# Patient Record
Sex: FEMALE | Race: WHITE | NOT HISPANIC OR LATINO | Employment: OTHER | URBAN - METROPOLITAN AREA
[De-identification: names, ages, dates, MRNs, and addresses within clinical notes are randomized per-mention and may not be internally consistent; named-entity substitution may affect disease eponyms.]

---

## 2017-01-02 ENCOUNTER — HOSPITAL ENCOUNTER (EMERGENCY)
Facility: MEDICAL CENTER | Age: 67
End: 2017-01-03
Attending: EMERGENCY MEDICINE
Payer: COMMERCIAL

## 2017-01-02 ENCOUNTER — APPOINTMENT (OUTPATIENT)
Dept: RADIOLOGY | Facility: MEDICAL CENTER | Age: 67
End: 2017-01-02
Attending: EMERGENCY MEDICINE
Payer: COMMERCIAL

## 2017-01-02 DIAGNOSIS — R05.3 PERSISTENT COUGH: ICD-10-CM

## 2017-01-02 DIAGNOSIS — J20.9 BRONCHITIS WITH BRONCHOSPASM: ICD-10-CM

## 2017-01-02 PROCEDURE — 71020 DX-CHEST-2 VIEWS: CPT

## 2017-01-02 PROCEDURE — 700101 HCHG RX REV CODE 250: Performed by: EMERGENCY MEDICINE

## 2017-01-02 PROCEDURE — 99284 EMERGENCY DEPT VISIT MOD MDM: CPT

## 2017-01-02 PROCEDURE — 94760 N-INVAS EAR/PLS OXIMETRY 1: CPT

## 2017-01-02 PROCEDURE — 94640 AIRWAY INHALATION TREATMENT: CPT

## 2017-01-02 RX ADMIN — ALBUTEROL SULFATE 2.5 MG: 2.5 SOLUTION RESPIRATORY (INHALATION) at 22:38

## 2017-01-02 ASSESSMENT — PAIN SCALES - GENERAL: PAINLEVEL_OUTOF10: 0

## 2017-01-02 NOTE — ED AVS SNAPSHOT
[facility] Access Code: Activation code not generated  Current [facility] Status: Active    TIME PLUS Qhart  A secure, online tool to manage your health information     [employer]’s [facility]® is a secure, online tool that connects you to your personalized health information from the privacy of your home -- day or night - making it very easy for you to manage your healthcare. Once the activation process is completed, you can even access your medical information using the [facility] yash, which is available for free in the Apple Yash store or Google Play store.     [facility] provides the following levels of access (as shown below):   My Chart Features   [facility] Primary Care Doctor [facility]  Specialists [facility]  Urgent  Care Non-[facility]  Primary Care  Doctor   Email your healthcare team securely and privately 24/7 X X X X   Manage appointments: schedule your next appointment; view details of past/upcoming appointments X      Request prescription refills. X      View recent personal medical records, including lab and immunizations X X X X   View health record, including health history, allergies, medications X X X X   Read reports about your outpatient visits, procedures, consult and ER notes X X X X   See your discharge summary, which is a recap of your hospital and/or ER visit that includes your diagnosis, lab results, and care plan. X X       How to register for [facility]:  1. Go to  [email or web address].  2. Click on the Sign Up Now box, which takes you to the New Member Sign Up page. You will need to provide the following information:  a. Enter your [facility] Access Code exactly as it appears at the top of this page. (You will not need to use this code after you’ve completed the sign-up process. If you do not sign up before the expiration date, you must request a new code.)   b. Enter your date of birth.   c. Enter your home email address.   d. Click Submit, and follow the next screen’s instructions.  3. Create a [facility] ID. This will  be your [employer] login ID and cannot be changed, so think of one that is secure and easy to remember.  4. Create a [employer] password. You can change your password at any time.  5. Enter your Password Reset Question and Answer. This can be used at a later time if you forget your password.   6. Enter your e-mail address. This allows you to receive e-mail notifications when new information is available in [employer].  7. Click Sign Up. You can now view your health information.    For assistance activating your [employer] account, call [phone]

## 2017-01-02 NOTE — ED AVS SNAPSHOT
1/3/2017          Prince Mazariegos  [address] [place] [address]    Dear Prince:    [facility] wants to ensure your discharge home is safe and you or your loved ones have had all your questions answered regarding your care after you leave the hospital.    You may receive a telephone call within two days of your discharge.  This call is to make certain you understand your discharge instructions as well as ensure we provided you with the best care possible during your stay with us.     The call will only last approximately 3-5 minutes and will be done by a nurse.    Once again, we want to ensure your discharge home is safe and that you have a clear understanding of any next steps in your care.  If you have any questions or concerns, please do not hesitate to contact us, we are here for you.  Thank you for choosing [facility] for your healthcare needs.    Sincerely,    Amol Angelo    [facility]

## 2017-01-02 NOTE — ED AVS SNAPSHOT
After Visit Summary                                                                                                                Prince Mazariegos   MRN: [id number]    Department:  [facility], Emergency Dept   Date of Visit:  1/2/2017            [facility], Emergency Dept    [address]    [place] [address]    Phone:  [phone]      You were seen by     Kim Fontaine D.O.      Your Diagnosis Was     Persistent cough     R05       These are the medications you received during your hospitalization from 01/02/2017 1952 to 01/03/2017 0006     Date/Time Order Dose Route Action    01/02/2017 2238 albuterol (PROVENTIL) 2.5mg/0.5ml nebulizer solution 2.5 mg 2.5 mg Nebulization Given      Follow-up Information     1. Follow up with Joao Ireland M.D.. Schedule an appointment as soon as possible for a visit in 1 week.    Specialty:  Family Medicine    Why:  As needed    Contact information    [address]  [address]  [place] [address] [phone]        Medication Information     Review all of your home medications and newly ordered medications with your primary doctor and/or pharmacist as soon as possible. Follow medication instructions as directed by your doctor and/or pharmacist.     Please keep your complete medication list with you and share with your physician. Update the information when medications are discontinued, doses are changed, or new medications (including over-the-counter products) are added; and carry medication information at all times in the event of emergency situations.               Medication List      START taking these medications        Instructions    albuterol 108 (90 BASE) MCG/ACT Aers inhalation aerosol    Inhale 2 Puffs by mouth every 6 hours as needed for Shortness of Breath.   Dose:  2 Puff       benzonatate 200 MG capsule   Commonly known as:  TESSALON    Take 1 Cap by mouth 3 times a day as needed for Cough for up to  20 doses.   Dose:  200 mg         ASK your doctor about these medications        Instructions    Calcium 500 MG Chew    Take 1,000 mg by mouth every day.   Dose:  1000 mg       calcium acetate 667 MG Caps   Commonly known as:  PHOS-LO    Take 1,334 mg by mouth 3 times a day, with meals.   Dose:  1334 mg       Fiber Powd    Take  by mouth.       hydrochlorothiazide 25 MG Tabs   Commonly known as:  HYDRODIURIL    TAKE 1 TAB BY MOUTH EVERY DAY.       levothyroxine 75 MCG Tabs   Commonly known as:  SYNTHROID    TAKE 1 TAB BY MOUTH EVERY DAY.       lisinopril 40 MG tablet   Commonly known as:  PRINIVIL, ZESTRIL    TAKE 1 TAB BY MOUTH EVERY DAY.       metoprolol SR 50 MG Tb24   Commonly known as:  TOPROL XL    TAKE 1 TABLET BY MOUTH EVERY DAY.       MULTI-VITAMIN GUMMIES PO    Take  by mouth.       multivitamin Tabs    Take 1 Tab by mouth every day.   Dose:  1 Tab       nitroglycerin 0.4 MG Subl   Commonly known as:  NITROSTAT    Place 1 Tab under tongue as needed for Chest Pain.   Dose:  0.4 mg       rosuvastatin 10 MG Tabs   Commonly known as:  CRESTOR    TAKE 1 TAB BY MOUTH EVERY DAY.       TUMS 500 MG Chew   Generic drug:  calcium carbonate    Take 500 mg by mouth every day.   Dose:  500 mg               Procedures and tests performed during your visit     DX-CHEST-2 VIEWS    Pulse Ox        Discharge Instructions       Cool Mist Vaporizers  Vaporizers may help relieve the symptoms of a cough and cold. They add moisture to the air, which helps mucus to become thinner and less sticky. This makes it easier to breathe and cough up secretions. Cool mist vaporizers do not cause serious burns like hot mist vaporizers, which may also be called steamers or humidifiers. Vaporizers have not been proven to help with colds. You should not use a vaporizer if you are allergic to mold.  HOME CARE INSTRUCTIONS  · Follow the package instructions for the vaporizer.  · Do not use anything other than distilled water in the  vaporizer.  · Do not run the vaporizer all of the time. This can cause mold or bacteria to grow in the vaporizer.  · Clean the vaporizer after each time it is used.  · Clean and dry the vaporizer well before storing it.  · Stop using the vaporizer if worsening respiratory symptoms develop.     This information is not intended to replace advice given to you by your health care provider. Make sure you discuss any questions you have with your health care provider.     Document Released: 09/14/2005 Document Revised: 12/23/2014 Document Reviewed: 05/07/2014  [employer] Interactive Patient Education ©2016 Else[employer] Inc.    Cough, Adult   A cough is a reflex. It helps you clear your throat and airways. A cough can help heal your body. A cough can last 2 or 3 weeks (acute) or may last more than 8 weeks (chronic). Some common causes of a cough can include an infection, allergy, or a cold.  HOME CARE  · Only take medicine as told by your doctor.  · If given, take your medicines (antibiotics) as told. Finish them even if you start to feel better.  · Use a cold steam vaporizer or humidifier in your home. This can help loosen thick spit (secretions).  · Sleep so you are almost sitting up (semi-upright). Use pillows to do this. This helps reduce coughing.  · Rest as needed.  · Stop smoking if you smoke.  GET HELP RIGHT AWAY IF:  · You have yellowish-white fluid (pus) in your thick spit.  · Your cough gets worse.  · Your medicine does not reduce coughing, and you are losing sleep.  · You cough up blood.  · You have trouble breathing.  · Your pain gets worse and medicine does not help.  · You have a fever.  MAKE SURE YOU:   · Understand these instructions.  · Will watch your condition.  · Will get help right away if you are not doing well or get worse.     This information is not intended to replace advice given to you by your health care provider. Make sure you discuss any questions you have with your health care provider.      Document Released: 08/30/2012 Document Revised: 01/08/2016 Document Reviewed: 02/24/2016  [employer] Interactive Patient Education ©2016 [employer].    Bronchitis  Bronchitis is the body's way of reacting to injury and/or infection (inflammation) of the bronchi. Bronchi are the air tubes that extend from the windpipe into the lungs. If the inflammation becomes severe, it may cause shortness of breath.  CAUSES   Inflammation may be caused by:  · A virus.  · Germs (bacteria).  · Dust.  · Allergens.  · Pollutants and many other irritants.  The cells lining the bronchial tree are covered with tiny hairs (cilia). These constantly beat upward, away from the lungs, toward the mouth. This keeps the lungs free of pollutants. When these cells become too irritated and are unable to do their job, mucus begins to develop. This causes the characteristic cough of bronchitis. The cough clears the lungs when the cilia are unable to do their job. Without either of these protective mechanisms, the mucus would settle in the lungs. Then you would develop pneumonia.  Smoking is a common cause of bronchitis and can contribute to pneumonia. Stopping this habit is the single most important thing you can do to help yourself.  TREATMENT   · Your caregiver may prescribe an antibiotic if the cough is caused by bacteria. Also, medicines that open up your airways make it easier to breathe. Your caregiver may also recommend or prescribe an expectorant. It will loosen the mucus to be coughed up. Only take over-the-counter or prescription medicines for pain, discomfort, or fever as directed by your caregiver.  · Removing whatever causes the problem (smoking, for example) is critical to preventing the problem from getting worse.  · Cough suppressants may be prescribed for relief of cough symptoms.  · Inhaled medicines may be prescribed to help with symptoms now and to help prevent problems from returning.  · For those with recurrent (chronic)  bronchitis, there may be a need for steroid medicines.  SEEK IMMEDIATE MEDICAL CARE IF:   · During treatment, you develop more pus-like mucus (purulent sputum).  · You have a fever.  · Your baby is older than 3 months with a rectal temperature of 102° F (38.9° C) or higher.  · Your baby is 3 months old or younger with a rectal temperature of 100.4° F (38° C) or higher.  · You become progressively more ill.  · You have increased difficulty breathing, wheezing, or shortness of breath.  It is necessary to seek immediate medical care if you are elderly or sick from any other disease.  MAKE SURE YOU:   · Understand these instructions.  · Will watch your condition.  · Will get help right away if you are not doing well or get worse.  Document Released: 12/18/2006 Document Revised: 03/11/2013 Document Reviewed: 10/27/2009  [employer]Care® Patient Information ©2014 [employer].            Patient Information     Patient Information    Following emergency treatment: all patient requiring follow-up care must return either to a private physician or a clinic if your condition worsens before you are able to obtain further medical attention, please return to the emergency room.     Billing Information    At [facility], we work to make the billing process streamlined for our patients.  Our Representatives are here to answer any questions you may have regarding your hospital bill.  If you have insurance coverage and have supplied your insurance information to us, we will submit a claim to your insurer on your behalf.  Should you have any questions regarding your bill, we can be reached online or by phone as follows:  Online: You are able pay your bills online or live chat with our representatives about any billing questions you may have. We are here to help Monday - Friday from 8:00am to 7:30pm and 9:00am - 12:00pm on Saturdays.  Please visit [email or web address]  for more information.   Phone:   [phone] or [phone]    Please note that your emergency physician, surgeon, pathologist, radiologist, anesthesiologist, and other specialists are not employed by [facility] and will therefore bill separately for their services.  Please contact them directly for any questions concerning their bills at the numbers below:     [facility] Physician Services:  [phone]  [facility]:  [phone]  [facility]:  [phone]  [facility]:  [phone]    1. Your final bill may vary from the amount quoted upon discharge if all procedures are not complete at that time, or if your doctor has additional procedures of which we are not aware. You will receive an additional bill if you return to the Emergency Department at [facility] for suture removal regardless of the facility of which the sutures were placed.     2. Please arrange for settlement of this account at the emergency registration.    3. All self-pay accounts are due in full at the time of treatment.  If you are unable to meet this obligation then payment is expected within 4-5 days.     4. If you have had radiology studies (CT, X-ray, Ultrasound, MRI), you have received a preliminary result during your emergency department visit. Please contact the radiology department [phone] to receive a copy of your final result. Please discuss the Final result with your primary physician or with the follow up physician provided.     Crisis Hotline:  [place] Crisis Hotline:  5-007-MAFDABJ or 1-393.849.7277  [place] Crisis Hotline:    1-659.222.9176 or 349-147-8522         ED Discharge Follow Up Questions    1. In order to provide you with very good care, we would like to follow up with a phone call in the next few days.  May we have your permission to contact you?     YES /  NO    2. What is the best phone number to call you? (       )_____-__________    3. What is the best time to call you?      Morning  /   Afternoon  /  Evening                   Patient Signature:  ____________________________________________________________    Date:  ____________________________________________________________      Your appointments     Mar 14, 2017  8:00 AM   Established Patient with Joao Ireland M.D.   [facility]    [address]  [facility] [address]   [phone]           You will be receiving a confirmation call a few days before your appointment from our automated call confirmation system.

## 2017-01-03 ENCOUNTER — PATIENT OUTREACH (OUTPATIENT)
Dept: HEALTH INFORMATION MANAGEMENT | Facility: OTHER | Age: 67
End: 2017-01-03

## 2017-01-03 VITALS
SYSTOLIC BLOOD PRESSURE: 117 MMHG | HEART RATE: 79 BPM | HEIGHT: 62 IN | RESPIRATION RATE: 18 BRPM | BODY MASS INDEX: 40.69 KG/M2 | WEIGHT: 221.12 LBS | OXYGEN SATURATION: 97 % | DIASTOLIC BLOOD PRESSURE: 57 MMHG | TEMPERATURE: 97.2 F

## 2017-01-03 RX ORDER — ALBUTEROL SULFATE 90 UG/1
2 AEROSOL, METERED RESPIRATORY (INHALATION) EVERY 6 HOURS PRN
Qty: 8.5 G | Refills: 0 | Status: SHIPPED | OUTPATIENT
Start: 2017-01-03 | End: 2017-07-19 | Stop reason: SDUPTHER

## 2017-01-03 RX ORDER — BENZONATATE 200 MG/1
200 CAPSULE ORAL 3 TIMES DAILY PRN
Qty: 20 CAP | Refills: 0 | Status: SHIPPED | OUTPATIENT
Start: 2017-01-03 | End: 2017-04-10

## 2017-01-03 NOTE — ED NOTES
DC instructions, f/u et RX x2 provided et discussed, understanding verbalized.  Asks for an address to a 24 hour pharmacy, directions et address given.  Denies further needs, ambulates out of ER without difficulty, NAD noted.

## 2017-01-03 NOTE — ED PROVIDER NOTES
ED Provider Note    CHIEF COMPLAINT  Chief Complaint   Patient presents with   • Cough       HPI  Prince Mazariegos is a 66 y.o. female who presents tonight with a chief complaint of persistent cough since 18 December. She was seen at the urgent care and placed on doxycycline and Tessalon and states that she took them for a full 10 days but still had 10 pills left. She states she still has a persistent cough which is clear in nature. Despite taking the medication she states she is not getting better. She denies any fever, chills, hemoptysis or difficulty breathing. She states she is leaving to fly to [place] in the morning and wanted to get this taken care of.    REVIEW OF SYSTEMS  See HPI for further details. All other system reviews are negative.    PAST MEDICAL HISTORY  Past Medical History   Diagnosis Date   • Enlarged tonsils      Removed with adenoids   • Hypertension      Medicated   • Psychiatric disorder      Anxiety - medicated   • High cholesterol    • Arrhythmia    • GERD (gastroesophageal reflux disease)    • Arthritis    • Anesthesia      PONV   • Skin cancer      skin   • Chest pain May 2016     PET scan with no infarct or ischemia, normal LVEF.   • DDD (degenerative disc disease), cervical    • Hemorrhoids      bleeding   • Back pain    • Obesity    • Chickenpox    • [place] measles        FAMILY HISTORY  Family History   Problem Relation Age of Onset   • Cancer Mother    • Cancer Sister    • Cancer Maternal Aunt    • Cancer Other    • Cancer Paternal Grandfather      liver   • Heart Disease Neg Hx        SOCIAL HISTORY  Social History     Social History   • Marital Status: Single     Spouse Name: N/A   • Number of Children: N/A   • Years of Education: N/A     Social History Main Topics   • Smoking status: Never Smoker    • Smokeless tobacco: Never Used   • Alcohol Use: No   • Drug Use: No   • Sexual Activity: Not Asked     Other Topics Concern   • None     Social History Narrative       SURGICAL  "HISTORY  Past Surgical History   Procedure Laterality Date   • Other cardiac surgery       Cath - 4-5 years ago with ba   • Lumpectomy Right 2007     Breast lump removed   • Hysteroscopy with video diagnostic  10/7/08     Performed by MICHELINE WEBER at SURGERY SAME DAY [facility] ORS   • Dilation and curettage  10/7/08     Performed by MICHELINE WEBER at SURGERY [facility] DAY [facility]   • Carpal tunnel endoscopic  10/17/2011     Performed by EDGAR GARCIA at SURGERY SAME DAY [facility] ORS   • Carpal tunnel endoscopic  10/28/2011     Performed by EDGAR GARCIA at SURGERY SAME DAY [facility]   • Other       hemorrhoid banding   • Tonsillectomy     • Carpal tunnel release         CURRENT MEDICATIONS  Patient just finished doxycycline and Tessalon for 10 days    ALLERGIES  Allergies   Allergen Reactions   • Aleve [Gnp Naproxen Sodium]    • Latex Rash   • Naproxen Sodium Hives   • Other Misc Swelling     Some soap & toothpaste   • Skelaxin [Metaxalone] Hives   • Sunscreens        PHYSICAL EXAM  VITAL SIGNS: /57 mmHg  Pulse 66  Temp(Src) 36.2 °C (97.2 °F)  Resp 18  Ht 1.575 m (5' 2\")  Wt 100.3 kg (221 lb 1.9 oz)  BMI 40.43 kg/m2  SpO2 98%    Constitutional: Patient is well developed, well nourished in no acute distress.   HENT: Normocephalic, Tm's visualized without erythema. Oropharynx moist without erythema , nose normal with no drainage.   Eyes: PERRL, EOMI, Conjunctiva without erythema.   Neck: Supple with no anterior cervical adenopathy. Normal range of motion in flexion, extension and lateral rotation.   Lymphatic: No lymphadenopathy noted.   Cardiovascular: Normal heart rate and rhythm. No murmur.  Thorax & Lungs: Clear and equal breath sounds with diminished air exchange, no rhonchi, wheezing or rales.  Abdomen: Bowel sounds normal in all four quadrants. Soft,nontender.  Skin: Warm, Dry, No rashes.   Back: No cervical, thoracic, or lumbosacral tenderness.   Extremities: " Peripheral pulses 4/4 No edema, No tenderness   Musculoskeletal: Normal range of motion in all major joints. No tenderness to palpation.  Neurologic: Alert & oriented x 3, Normal motor function, Normal sensory function  Psychiatric: Affect normal, Judgment normal, Mood normal.     RADIOLOGY/PROCEDURES  DX-CHEST-2 VIEWS   Final Result      No acute findings.            COURSE & MEDICAL DECISION MAKING  Pertinent Labs & Imaging studies reviewed. (See chart for details)  Patient received an albuterol breathing treatment and chest x-ray was performed. Chest x-ray was negative for any infiltrates. The patient is feeling better after the albuterol treatment. My plan will be to give her spacer instructions with an albuterol inhaler as well as a refill of her Tessalon Perles. She is to increase fluids, avoid dairy products, warm saltwater gargles, cool mist humidifier, follow up with her primary care doctor when she returns back from [place]. She is discharged in stable and improved condition.    FINAL IMPRESSION  1. Persistent cough  2. Recent bronchitis  3. Bronchospasm         Electronically signed by: Kim Fontaine, 1/2/2017 10:53 PM

## 2017-01-03 NOTE — ED NOTES
Complains of unresolved cough since the 18 of December.  Pt has been seen at a urgent care at the time and treated with antibiotics.

## 2017-01-03 NOTE — DISCHARGE INSTRUCTIONS
Cool Mist Vaporizers  Vaporizers may help relieve the symptoms of a cough and cold. They add moisture to the air, which helps mucus to become thinner and less sticky. This makes it easier to breathe and cough up secretions. Cool mist vaporizers do not cause serious burns like hot mist vaporizers, which may also be called steamers or humidifiers. Vaporizers have not been proven to help with colds. You should not use a vaporizer if you are allergic to mold.  HOME CARE INSTRUCTIONS  · Follow the package instructions for the vaporizer.  · Do not use anything other than distilled water in the vaporizer.  · Do not run the vaporizer all of the time. This can cause mold or bacteria to grow in the vaporizer.  · Clean the vaporizer after each time it is used.  · Clean and dry the vaporizer well before storing it.  · Stop using the vaporizer if worsening respiratory symptoms develop.     This information is not intended to replace advice given to you by your health care provider. Make sure you discuss any questions you have with your health care provider.     Document Released: 09/14/2005 Document Revised: 12/23/2014 Document Reviewed: 05/07/2014  [employer] Interactive Patient Education ©2016 [employer].    Cough, Adult   A cough is a reflex. It helps you clear your throat and airways. A cough can help heal your body. A cough can last 2 or 3 weeks (acute) or may last more than 8 weeks (chronic). Some common causes of a cough can include an infection, allergy, or a cold.  HOME CARE  · Only take medicine as told by your doctor.  · If given, take your medicines (antibiotics) as told. Finish them even if you start to feel better.  · Use a cold steam vaporizer or humidifier in your home. This can help loosen thick spit (secretions).  · Sleep so you are almost sitting up (semi-upright). Use pillows to do this. This helps reduce coughing.  · Rest as needed.  · Stop smoking if you smoke.  GET HELP RIGHT AWAY IF:  · You have  yellowish-white fluid (pus) in your thick spit.  · Your cough gets worse.  · Your medicine does not reduce coughing, and you are losing sleep.  · You cough up blood.  · You have trouble breathing.  · Your pain gets worse and medicine does not help.  · You have a fever.  MAKE SURE YOU:   · Understand these instructions.  · Will watch your condition.  · Will get help right away if you are not doing well or get worse.     This information is not intended to replace advice given to you by your health care provider. Make sure you discuss any questions you have with your health care provider.     Document Released: 08/30/2012 Document Revised: 01/08/2016 Document Reviewed: 02/24/2016  [employer] Interactive Patient Education ©2016 Elsevier Inc.    Bronchitis  Bronchitis is the body's way of reacting to injury and/or infection (inflammation) of the bronchi. Bronchi are the air tubes that extend from the windpipe into the lungs. If the inflammation becomes severe, it may cause shortness of breath.  CAUSES   Inflammation may be caused by:  · A virus.  · Germs (bacteria).  · Dust.  · Allergens.  · Pollutants and many other irritants.  The cells lining the bronchial tree are covered with tiny hairs (cilia). These constantly beat upward, away from the lungs, toward the mouth. This keeps the lungs free of pollutants. When these cells become too irritated and are unable to do their job, mucus begins to develop. This causes the characteristic cough of bronchitis. The cough clears the lungs when the cilia are unable to do their job. Without either of these protective mechanisms, the mucus would settle in the lungs. Then you would develop pneumonia.  Smoking is a common cause of bronchitis and can contribute to pneumonia. Stopping this habit is the single most important thing you can do to help yourself.  TREATMENT   · Your caregiver may prescribe an antibiotic if the cough is caused by bacteria. Also, medicines that open up your  airways make it easier to breathe. Your caregiver may also recommend or prescribe an expectorant. It will loosen the mucus to be coughed up. Only take over-the-counter or prescription medicines for pain, discomfort, or fever as directed by your caregiver.  · Removing whatever causes the problem (smoking, for example) is critical to preventing the problem from getting worse.  · Cough suppressants may be prescribed for relief of cough symptoms.  · Inhaled medicines may be prescribed to help with symptoms now and to help prevent problems from returning.  · For those with recurrent (chronic) bronchitis, there may be a need for steroid medicines.  SEEK IMMEDIATE MEDICAL CARE IF:   · During treatment, you develop more pus-like mucus (purulent sputum).  · You have a fever.  · Your baby is older than 3 months with a rectal temperature of 102° F (38.9° C) or higher.  · Your baby is 3 months old or younger with a rectal temperature of 100.4° F (38° C) or higher.  · You become progressively more ill.  · You have increased difficulty breathing, wheezing, or shortness of breath.  It is necessary to seek immediate medical care if you are elderly or sick from any other disease.  MAKE SURE YOU:   · Understand these instructions.  · Will watch your condition.  · Will get help right away if you are not doing well or get worse.  Document Released: 12/18/2006 Document Revised: 03/11/2013 Document Reviewed: 10/27/2009  [employer]® Patient Information ©2014 [employer].

## 2017-01-03 NOTE — PROGRESS NOTES
1-3-17 attempted to contact patient regarding post discharge outreach after her recent visit to [facility] [place].  Left message on voice mail system.  Provided discharge outreach number [phone].  Will attempt to contact again tomorrow.    1-4-17 second attempt to contact patient.  Left message on voice mail system.  Will attempt contact again tomorrow.    1-5-17 third attempt to contact patient.  Again left message on voice mail system.  Patient not contacted.  Will close this encounter.

## 2017-01-03 NOTE — ED NOTES
The patient feels like she has something she needs to cough up but it won't come out.  Patient is scheduled to fly to [place] tomorrow am.

## 2017-03-14 ENCOUNTER — OFFICE VISIT (OUTPATIENT)
Dept: MEDICAL GROUP | Facility: MEDICAL CENTER | Age: 67
End: 2017-03-14
Payer: COMMERCIAL

## 2017-03-14 VITALS
DIASTOLIC BLOOD PRESSURE: 74 MMHG | HEIGHT: 62 IN | WEIGHT: 228 LBS | OXYGEN SATURATION: 98 % | RESPIRATION RATE: 16 BRPM | TEMPERATURE: 97.9 F | SYSTOLIC BLOOD PRESSURE: 128 MMHG | BODY MASS INDEX: 41.96 KG/M2 | HEART RATE: 69 BPM

## 2017-03-14 DIAGNOSIS — E03.9 HYPOTHYROIDISM, UNSPECIFIED TYPE: ICD-10-CM

## 2017-03-14 DIAGNOSIS — M54.6 THORACIC SPINE PAIN: ICD-10-CM

## 2017-03-14 DIAGNOSIS — G47.30 SLEEP APNEA IN ADULT: ICD-10-CM

## 2017-03-14 DIAGNOSIS — E66.01 MORBID OBESITY WITH BMI OF 40.0-44.9, ADULT (HCC): ICD-10-CM

## 2017-03-14 DIAGNOSIS — I10 ESSENTIAL HYPERTENSION: ICD-10-CM

## 2017-03-14 DIAGNOSIS — E78.5 DYSLIPIDEMIA: ICD-10-CM

## 2017-03-14 DIAGNOSIS — R21 RASH OF FACE: ICD-10-CM

## 2017-03-14 DIAGNOSIS — M54.2 CERVICAL SPINE PAIN: ICD-10-CM

## 2017-03-14 DIAGNOSIS — Z23 NEED FOR TDAP VACCINATION: ICD-10-CM

## 2017-03-14 DIAGNOSIS — N18.30 CKD (CHRONIC KIDNEY DISEASE) STAGE 3, GFR 30-59 ML/MIN (HCC): ICD-10-CM

## 2017-03-14 PROCEDURE — 90715 TDAP VACCINE 7 YRS/> IM: CPT | Performed by: FAMILY MEDICINE

## 2017-03-14 PROCEDURE — 90471 IMMUNIZATION ADMIN: CPT | Performed by: FAMILY MEDICINE

## 2017-03-14 PROCEDURE — 99214 OFFICE O/P EST MOD 30 MIN: CPT | Mod: 25 | Performed by: FAMILY MEDICINE

## 2017-03-14 RX ORDER — TRIAMCINOLONE ACETONIDE 0.25 MG/G
1 CREAM TOPICAL 2 TIMES DAILY
Qty: 1 TUBE | Refills: 0 | Status: SHIPPED | OUTPATIENT
Start: 2017-03-14 | End: 2018-05-18

## 2017-03-14 ASSESSMENT — PATIENT HEALTH QUESTIONNAIRE - PHQ9: CLINICAL INTERPRETATION OF PHQ2 SCORE: 0

## 2017-03-14 ASSESSMENT — PAIN SCALES - GENERAL: PAINLEVEL: 5=MODERATE PAIN

## 2017-03-14 NOTE — MR AVS SNAPSHOT
"        Prince Garciar   3/14/2017 8:00 AM   Office Visit   MRN: [id number]    Department:  [facility]   Dept Phone:  [phone]    Description:  Female : 1950   Provider:  Joao Ireland M.D.           Reason for Visit     Follow-Up           Allergies as of 3/14/2017     Allergen Noted Reactions    Aleve [Gnp Naproxen Sodium] 10/12/2011       Latex 10/12/2011   Rash    Naproxen Sodium 06/15/2008   Hives    Other Misc 10/12/2011   Swelling    Some soap & toothpaste    Skelaxin [Metaxalone] 06/15/2008   Hives    Sunscreens 2015         You were diagnosed with     Hypothyroidism, unspecified type   [6845492]       Dyslipidemia   [704057]       Essential hypertension   [7464816]       CKD (chronic kidney disease) stage 3, GFR 30-59 ml/min   [511632]       Morbid obesity with BMI of 40.0-44.9, adult (CMS-HCC)   [211265]       Cervical spine pain   [319247]       Thoracic spine pain   [023243]       Rash of face   [450700]       Need for Tdap vaccination   [757131]         Vital Signs     Blood Pressure Pulse Temperature Respirations Height Weight    128/74 mmHg 69 36.6 °C (97.9 °F) 16 1.575 m (5' 2.01\") 103.42 kg (228 lb)    Body Mass Index Oxygen Saturation Breastfeeding? Smoking Status          41.69 kg/m2 98% No Never Smoker         Basic Information     Date Of Birth Sex Race Ethnicity Preferred Language    1950 Female White Non- English      Your appointments     Apr 10, 2017  3:40 PM   Follow UP with CHLOE Meredith   [facility] (--)    [address] [address] [phone]              Problem List              ICD-10-CM Priority Class Noted - Resolved    Anxiety F41.9 Low  2013 - Present    GERD (gastroesophageal reflux disease) K21.9 Medium  2013 - Present    Hypothyroid E03.9 Medium  2013 - Present    Dyslipidemia E78.5 High  2013 - Present    HTN (hypertension) I10 High  2013 - " Present    CKD (chronic kidney disease) stage 3, GFR 30-59 ml/min N18.3 Medium  9/16/2015 - Present    Chronic lower back pain M54.5, G89.29 Medium  9/16/2015 - Present    Chronic pain of right knee M25.561, G89.29 Medium  9/16/2015 - Present    Bleeding hemorrhoid K64.9   2/12/2016 - Present    Chest pain R07.9 High  4/21/2016 - Present    Cervical spine pain M54.2   6/2/2016 - Present    Thoracic spine pain M54.6   6/2/2016 - Present    Sleep apnea in adult G47.33   7/14/2016 - Present    Increased BMI R63.8   10/25/2016 - Present    Morbid obesity with BMI of 40.0-44.9, adult (HCC) E66.01, Z68.41   3/14/2017 - Present    Rash of face R21   3/14/2017 - Present      Health Maintenance        Date Due Completion Dates    IMM DTaP/Tdap/Td Vaccine (1 - Tdap) 5/29/1971 ---    PAP SMEAR 10/1/2015 10/1/2012 (N/S)    Override on 10/1/2012: (N/S)    IMM INFLUENZA (1) 9/1/2016 11/17/2014, 11/11/2013    MAMMOGRAM 10/7/2017 10/7/2016, 9/2/2015, 8/25/2014, 8/21/2013, 8/20/2012, 8/17/2011, 8/16/2010, 8/16/2010, 8/13/2009, 8/13/2009, 7/23/2008, 7/23/2008, 7/19/2007, 7/19/2007, 3/6/2007, 8/22/2006, 8/15/2006, 8/12/2005, 8/11/2004    COLONOSCOPY 4/1/2019 4/1/2009 (N/S)    Override on 4/1/2009: (N/S)            Current Immunizations     Influenza TIV (IM) 11/11/2013  3:05 PM    Influenza Vaccine Quad Inj (Preserved) 11/17/2014  3:00 PM    SHINGLES VACCINE 11/11/2013  3:04 PM    TD Vaccine 4/13/2000    Tdap Vaccine  Incomplete      Below and/or attached are the medications your provider expects you to take. Review all of your home medications and newly ordered medications with your provider and/or pharmacist. Follow medication instructions as directed by your provider and/or pharmacist. Please keep your medication list with you and share with your provider. Update the information when medications are discontinued, doses are changed, or new medications (including over-the-counter products) are added; and carry medication information  at all times in the event of emergency situations     Allergies:  ALEVE - (reactions not documented)     LATEX - Rash     NAPROXEN SODIUM - Hives     OTHER MISC - Swelling     SKELAXIN - Hives     SUNSCREENS - (reactions not documented)               Medications  Valid as of: March 14, 2017 -  8:13 AM    Generic Name Brand Name Tablet Size Instructions for use    Acetaminophen-Codeine (Tab) TYLENOL #2 300-15 MG Take 1 Tab by mouth every 6 hours as needed for Severe Pain.        Albuterol Sulfate (Aero Soln) albuterol 108 (90 BASE) MCG/ACT Inhale 2 Puffs by mouth every 6 hours as needed for Shortness of Breath.        Benzonatate (Cap) TESSALON 200 MG Take 1 Cap by mouth 3 times a day as needed for Cough for up to 20 doses.        Calcium Acetate (Phos Binder) (Cap) PHOS- MG Take 1,334 mg by mouth 3 times a day, with meals.        Calcium Carbonate (Chew Tab) Calcium 500 MG Take 1,000 mg by mouth every day.        Calcium Carbonate Antacid (Chew Tab) TUMS 500 MG Take 500 mg by mouth every day.        Fiber (Powder) Fiber  Take  by mouth.        HydroCHLOROthiazide (Tab) HYDRODIURIL 25 MG TAKE 1 TAB BY MOUTH EVERY DAY.        Levothyroxine Sodium (Tab) SYNTHROID 75 MCG TAKE 1 TAB BY MOUTH EVERY DAY.        Lisinopril (Tab) PRINIVIL, ZESTRIL 40 MG TAKE 1 TAB BY MOUTH EVERY DAY.        Metoprolol Succinate (TABLET SR 24 HR) TOPROL XL 50 MG TAKE 1 TABLET BY MOUTH EVERY DAY.        Multiple Vitamin (Tab) THERAGRAN  Take 1 Tab by mouth every day.        Multiple Vitamins-Minerals   Take  by mouth.        Rosuvastatin Calcium (Tab) CRESTOR 10 MG TAKE 1 TAB BY MOUTH EVERY DAY.        Triamcinolone Acetonide (Cream) KENALOG 0.025 % Apply 1 Application to affected area(s) 2 times a day.        .                 Medicines prescribed today were sent to:     [id number] - [place] - [address]    [address] [place] [address]    Phone: [phone] Fax: [phone]    Open 24 Hours?: No      Medication  refill instructions:       If your prescription bottle indicates you have medication refills left, it is not necessary to call your provider’s office. Please contact your pharmacy and they will refill your medication.    If your prescription bottle indicates you do not have any refills left, you may request refills at any time through one of the following ways: The online [employer] system (except Urgent Care), by calling your provider’s office, or by asking your pharmacy to contact your provider’s office with a refill request. Medication refills are processed only during regular business hours and may not be available until the next business day. Your provider may request additional information or to have a follow-up visit with you prior to refilling your medication.   *Please Note: Medication refills are assigned a new Rx number when refilled electronically. Your pharmacy may indicate that no refills were authorized even though a new prescription for the same medication is available at the pharmacy. Please request the medicine by name with the pharmacy before contacting your provider for a refill.        Your To Do List     Future Labs/Procedures Complete By Expires    CBC WITH DIFFERENTIAL  As directed 3/15/2018    COMP METABOLIC PANEL  As directed 3/15/2018    LIPID PROFILE  As directed 3/15/2018    TSH WITH REFLEX TO FT4  As directed 3/14/2018    VITAMIN D,25 HYDROXY  As directed 3/15/2018         [facility] Access Code: Activation code not generated  Current [employer] Status: Active

## 2017-03-14 NOTE — ASSESSMENT & PLAN NOTE
Patient had an x-ray done approximately 6 months ago which showed moderate degeneration of cervical spine. She has been using Tylenol to control her pain for the most part. There are occasions where her pain will not be controlled with Tylenol and she is requesting a refill of Tylenol No. 2, which she last filled 3 years ago.

## 2017-03-14 NOTE — ASSESSMENT & PLAN NOTE
Patient is using a CPAP machine. She states that her sleep has improved since using the CPAP machine. She does find some slight discomfort with sleeping with the machine.

## 2017-03-14 NOTE — ASSESSMENT & PLAN NOTE
In 2013 patient was diagnosed with chronic kidney disease stage III, which has been stable since that time. She contributes the diagnosis to heavy NSAID use, which she is now voiding.

## 2017-03-14 NOTE — ASSESSMENT & PLAN NOTE
Patient has moderate degeneration of thoracic spine. It occasionally limits her ability in functioning. She takes Tylenol for pain control but occasionally, particularly with the weather change, she requires something a little stronger. She was given a prescription for Tylenol No. 2 in 2014, which she is now running out of.

## 2017-03-14 NOTE — PROGRESS NOTES
Subjective:   Prince Mazariegos is a 66 y.o. female here today for pain, rash    Cervical spine pain  Patient had an x-ray done approximately 6 months ago which showed moderate degeneration of cervical spine. She has been using Tylenol to control her pain for the most part. There are occasions where her pain will not be controlled with Tylenol and she is requesting a refill of Tylenol No. 2, which she last filled 3 years ago.    Thoracic spine pain  Patient has moderate degeneration of thoracic spine. It occasionally limits her ability in functioning. She takes Tylenol for pain control but occasionally, particularly with the weather change, she requires something a little stronger. She was given a prescription for Tylenol No. 2 in 2014, which she is now running out of.    Rash of face  Patient used a topical cream by the dermatologist broke out with a rash across her nose and forehead. She has multiple allergies. Patient has stopped using the cream. She has been using topical over-the-counter lotions, which have only been helping slightly.    Morbid obesity with BMI of 40.0-44.9, adult (HCC)  Patient has gained approximately 18 pounds in the last 4 months.    Hypothyroid  Patient is on levothyroxine 75 µg daily. She is due for annual blood work.    HTN (hypertension)  Patient is tolerating current medications without noticeable side effects.    Dyslipidemia  Patient is tolerating current medication without noticeable side effects.    CKD (chronic kidney disease) stage 3, GFR 30-59 ml/min  In 2013 patient was diagnosed with chronic kidney disease stage III, which has been stable since that time. She contributes the diagnosis to heavy NSAID use, which she is now voiding.    Sleep apnea in adult  Patient is using a CPAP machine. She states that her sleep has improved since using the CPAP machine. She does find some slight discomfort with sleeping with the machine.         Current medicines (including changes  today)  Current Outpatient Prescriptions   Medication Sig Dispense Refill   • acetaminophen-codeine #2 (TYLENOL #2) 300-15 MG Tab Take 1 Tab by mouth every 6 hours as needed for Severe Pain. 30 Tab 1   • triamcinolone acetonide (KENALOG) 0.025 % Cream Apply 1 Application to affected area(s) 2 times a day. 1 Tube 0   • albuterol 108 (90 BASE) MCG/ACT Aero Soln inhalation aerosol Inhale 2 Puffs by mouth every 6 hours as needed for Shortness of Breath. 8.5 g 0   • benzonatate (TESSALON) 200 MG capsule Take 1 Cap by mouth 3 times a day as needed for Cough for up to 20 doses. 20 Cap 0   • calcium acetate (PHOS-LO) 667 MG Cap Take 1,334 mg by mouth 3 times a day, with meals.     • multivitamin (THERAGRAN) Tab Take 1 Tab by mouth every day.     • Fiber Powder Take  by mouth.     • calcium carbonate (TUMS) 500 MG Chew Tab Take 500 mg by mouth every day.     • metoprolol SR (TOPROL XL) 50 MG TABLET SR 24 HR TAKE 1 TABLET BY MOUTH EVERY DAY. 90 Tab 3   • rosuvastatin (CRESTOR) 10 MG Tab TAKE 1 TAB BY MOUTH EVERY DAY. 90 Tab 3   • levothyroxine (SYNTHROID) 75 MCG Tab TAKE 1 TAB BY MOUTH EVERY DAY. 90 Tab 3   • lisinopril (PRINIVIL, ZESTRIL) 40 MG tablet TAKE 1 TAB BY MOUTH EVERY DAY. 90 Tab 3   • hydrochlorothiazide (HYDRODIURIL) 25 MG Tab TAKE 1 TAB BY MOUTH EVERY DAY. 90 Tab 3   • Calcium 500 MG Chew Tab Take 1,000 mg by mouth every day.     • Multiple Vitamins-Minerals (MULTI-VITAMIN GUMMIES PO) Take  by mouth.       No current facility-administered medications for this visit.     She  has a past medical history of Enlarged tonsils; Hypertension; Psychiatric disorder; High cholesterol; Arrhythmia; GERD (gastroesophageal reflux disease); Arthritis; Anesthesia; Skin cancer; Chest pain (May 2016); DDD (degenerative disc disease), cervical; Hemorrhoids; Back pain; Obesity; Chickenpox; and Yi measles.    ROS   No chest pain, no shortness of breath, no abdominal pain       Objective:     Blood pressure 128/74, pulse 69,  "temperature 36.6 °C (97.9 °F), resp. rate 16, height 1.575 m (5' 2.01\"), weight 103.42 kg (228 lb), SpO2 98 %, not currently breastfeeding. Body mass index is 41.69 kg/(m^2).   Physical Exam:  Constitutional: Alert, no distress.  Skin: Warm, dry, good turgor, no rashes in visible areas.  Eye: Equal, round and reactive, conjunctiva clear, lids normal.  Respiratory: Unlabored respiratory effort, lungs clear to auscultation, no wheezes, no ronchi.  Cardiovascular: Normal S1, S2, no murmur, no edema.  Psych: Alert and oriented x3, normal affect and mood.        Assessment and Plan:   The following treatment plan was discussed    1. Hypothyroidism, unspecified type  Continue current medication. Check labs and call with results. Follow-up in 6 months.  - TSH WITH REFLEX TO FT4; Future    2. Dyslipidemia  Continue current medication. Check labs and call with results. Follow-up in 6 months.  - COMP METABOLIC PANEL; Future  - LIPID PROFILE; Future    3. Essential hypertension  Continue current medications. Check labs and call with results. Follow-up in 6 months.  - CBC WITH DIFFERENTIAL; Future    4. CKD (chronic kidney disease) stage 3, GFR 30-59 ml/min  Advised patient to avoid NSAIDs. Check labs and call with results. Follow-up in 6 months with repeat labs.  - VITAMIN D,25 HYDROXY; Future    5. Morbid obesity with BMI of 40.0-44.9, adult (CMS-[facility])  - Patient identified as having weight management issue.  Appropriate orders and counseling given.    6. Cervical spine pain  Tylenol No. 2 prescription given for very infrequent use. Encourage patient to use Tylenol as a first line pain medication.  - acetaminophen-codeine #2 (TYLENOL #2) 300-15 MG Tab; Take 1 Tab by mouth every 6 hours as needed for Severe Pain.  Dispense: 30 Tab; Refill: 1    7. Thoracic spine pain  Tylenol No. 2 prescription given for very infrequent use. Encourage patient to use Tylenol as a first line pain medication.  - acetaminophen-codeine #2 (TYLENOL " #2) 300-15 MG Tab; Take 1 Tab by mouth every 6 hours as needed for Severe Pain.  Dispense: 30 Tab; Refill: 1    8. Rash of face  Mild cortisone cream prescribed. Advised patient to use cream one week on and one-week off. Use cream only if rashes present.  Avoid precipitating creams  - triamcinolone acetonide (KENALOG) 0.025 % Cream; Apply 1 Application to affected area(s) 2 times a day.  Dispense: 1 Tube; Refill: 0    9. Need for Tdap vaccination  - TDAP VACCINE =>8YO IM    10. Sleep apnea in adult  Controlled. Continue CPAP. Follow-up with pulmonology as scheduled.      Followup: Return in about 6 months (around 9/14/2017) for [employer] to [employer], [employer].

## 2017-03-14 NOTE — ASSESSMENT & PLAN NOTE
Patient used a topical cream by the dermatologist broke out with a rash across her nose and forehead. She has multiple allergies. Patient has stopped using the cream. She has been using topical over-the-counter lotions, which have only been helping slightly.

## 2017-03-17 ENCOUNTER — TELEPHONE (OUTPATIENT)
Dept: MEDICAL GROUP | Facility: MEDICAL CENTER | Age: 67
End: 2017-03-17

## 2017-03-17 ENCOUNTER — OFFICE VISIT (OUTPATIENT)
Dept: URGENT CARE | Facility: CLINIC | Age: 67
End: 2017-03-17
Payer: COMMERCIAL

## 2017-03-17 VITALS
OXYGEN SATURATION: 98 % | TEMPERATURE: 98.5 F | HEART RATE: 72 BPM | SYSTOLIC BLOOD PRESSURE: 122 MMHG | RESPIRATION RATE: 16 BRPM | DIASTOLIC BLOOD PRESSURE: 76 MMHG | BODY MASS INDEX: 41.69 KG/M2 | WEIGHT: 228 LBS

## 2017-03-17 DIAGNOSIS — H81.11 BPPV (BENIGN PAROXYSMAL POSITIONAL VERTIGO), RIGHT: Primary | ICD-10-CM

## 2017-03-17 DIAGNOSIS — R42 DIZZINESS: ICD-10-CM

## 2017-03-17 PROCEDURE — 99214 OFFICE O/P EST MOD 30 MIN: CPT | Performed by: PHYSICIAN ASSISTANT

## 2017-03-17 RX ORDER — FEXOFENADINE HCL AND PSEUDOEPHEDRINE HCI 180; 240 MG/1; MG/1
1 TABLET, EXTENDED RELEASE ORAL DAILY
Qty: 30 TAB | Refills: 0 | Status: SHIPPED | OUTPATIENT
Start: 2017-03-17 | End: 2017-04-10

## 2017-03-17 RX ORDER — MECLIZINE HYDROCHLORIDE 25 MG/1
25 TABLET ORAL 3 TIMES DAILY PRN
Qty: 30 TAB | Refills: 0 | Status: SHIPPED | OUTPATIENT
Start: 2017-03-17 | End: 2018-05-18

## 2017-03-17 ASSESSMENT — ENCOUNTER SYMPTOMS: DIZZINESS: 1

## 2017-03-17 NOTE — TELEPHONE ENCOUNTER
The cream usually doesn't cause dizziness, but she may want to discontinue it just to be sure.  The Tylenol No. 2 can cause dizziness, I'm not sure how often she is taking this.  Dehydration can also be a common cause for dizziness. Please make sure she is hydrating well.  If dizziness does not improve by next week, please have her follow-up in clinic so we can take a look at her again.  Joao Ireland M.D.

## 2017-03-17 NOTE — TELEPHONE ENCOUNTER
1. Caller Name: Pt                      Call Back Number: [phone]    2. Message: Pt left message stating she has been feeling dizzy to the point where she doesn't want to drive. She wants to know if the face cream you prescribed Tuesday can cause this.     3. Patient approves office to leave a detailed voicemail/MyChart message: N\A

## 2017-03-17 NOTE — PATIENT INSTRUCTIONS
Benign Positional Vertigo  Vertigo means you feel like you or your surroundings are moving when they are not. Benign positional vertigo is the most common form of vertigo. Benign means that the cause of your condition is not serious. Benign positional vertigo is more common in older adults.  CAUSES   Benign positional vertigo is the result of an upset in the labyrinth system. This is an area in the middle ear that helps control your balance. This may be caused by a viral infection, head injury, or repetitive motion. However, often no specific cause is found.  SYMPTOMS   Symptoms of benign positional vertigo occur when you move your head or eyes in different directions. Some of the symptoms may include:  · Loss of balance and falls.  · Vomiting.  · Blurred vision.  · Dizziness.  · Nausea.  · Involuntary eye movements (nystagmus).  DIAGNOSIS   Benign positional vertigo is usually diagnosed by physical exam. If the specific cause of your benign positional vertigo is unknown, your caregiver may perform imaging tests, such as magnetic resonance imaging (MRI) or computed tomography (CT).  TREATMENT   Your caregiver may recommend movements or procedures to correct the benign positional vertigo. Medicines such as meclizine, benzodiazepines, and medicines for nausea may be used to treat your symptoms. In rare cases, if your symptoms are caused by certain conditions that affect the inner ear, you may need surgery.  HOME CARE INSTRUCTIONS   · Follow your caregiver's instructions.  · Move slowly. Do not make sudden body or head movements.  · Avoid driving.  · Avoid operating heavy machinery.  · Avoid performing any tasks that would be dangerous to you or others during a vertigo episode.  · Drink enough fluids to keep your urine clear or pale yellow.  SEEK IMMEDIATE MEDICAL CARE IF:   · You develop problems with walking, weakness, numbness, or using your arms, hands, or legs.  · You have difficulty speaking.  · You develop  severe headaches.  · Your nausea or vomiting continues or gets worse.  · You develop visual changes.  · Your family or friends notice any behavioral changes.  · Your condition gets worse.  · You have a fever.  · You develop a stiff neck or sensitivity to light.  MAKE SURE YOU:   · Understand these instructions.  · Will watch your condition.  · Will get help right away if you are not doing well or get worse.     This information is not intended to replace advice given to you by your health care provider. Make sure you discuss any questions you have with your health care provider.     Document Released: 09/25/2007 Document Revised: 03/11/2013 Document Reviewed: 04/11/2016  [employer] Interactive Patient Education ©2016 [employer]

## 2017-03-17 NOTE — PROGRESS NOTES
Subjective:      Pt is a 66 y.o. female who presents with Dizziness            Dizziness  This is a new problem. The current episode started today. The problem occurs constantly. The problem has been gradually worsening. The symptoms are aggravated by exertion, standing and walking. She has tried nothing for the symptoms.   Pt states she was doing her usual morning exercises this morning before breakfast and noticed with certain movements dizziness occurring and then resolving, she notes this occurs for short periods in the past, but this seems to be lasting today she states. Pt has not taken any Rx medications for this condition. Pt states the pain is a 1/10, aching in nature and worse this morning. Pt denies CP, SOB, NVD, paresthesias, headaches, dizziness, change in vision, hives, or other joint pain. The pt's medication list, problem list, and allergies have been evaluated and reviewed during today's visit.    PMH:  Past Medical History   Diagnosis Date   • Enlarged tonsils      Removed with adenoids   • Hypertension      Medicated   • Psychiatric disorder      Anxiety - medicated   • High cholesterol    • Arrhythmia    • GERD (gastroesophageal reflux disease)    • Arthritis    • Anesthesia      PONV   • Skin cancer      skin   • Chest pain May 2016     PET scan with no infarct or ischemia, normal LVEF.   • DDD (degenerative disc disease), cervical    • Hemorrhoids      bleeding   • Back pain    • Obesity    • Chickenpox    • Bengali measles        PSH:  Past Surgical History   Procedure Laterality Date   • Other cardiac surgery       Cath - 4-5 years ago with ba   • Lumpectomy Right 2007     Breast lump removed   • Hysteroscopy with video diagnostic  10/7/08     Performed by MICHELINE WEBER at SURGERY SAME DAY [facility] ORS   • Dilation and curettage  10/7/08     Performed by MICHELINE WEBER at SURGERY SAME DAY [facility] ORS   • Carpal tunnel endoscopic  10/17/2011     Performed by RADHA  EDGAR ALMANZA at SURGERY SAME DAY [facility] ORS   • Carpal tunnel endoscopic  10/28/2011     [facility]   • Other       hemorrhoid banding   • Tonsillectomy     • Carpal tunnel release         Fam Hx:    family history includes Cancer in her maternal aunt, mother, other, paternal grandfather, and sister. There is no history of Heart Disease.  Family Status   Relation Status Death Age   • Mother     • Sister Alive    • Other Alive    • Paternal Grandfather     • Father     • Brother Alive    • Maternal Grandmother     • Maternal Grandfather     • Paternal Grandmother         Soc HX:  Social History     Social History   • Marital Status: Single     Spouse Name: N/A   • Number of Children: N/A   • Years of Education: N/A     Occupational History   • Not on file.     Social History Main Topics   • Smoking status: Never Smoker    • Smokeless tobacco: Never Used   • Alcohol Use: No   • Drug Use: No   • Sexual Activity: Not on file     Other Topics Concern   • Not on file     Social History Narrative         Medications:    Current outpatient prescriptions:   •  meclizine (ANTIVERT) 25 MG Tab, Take 1 Tab by mouth 3 times a day as needed., Disp: 30 Tab, Rfl: 0  •  fexofenadine-pseudoephedrine (ALLEGRA-D ALLERGY & CONGESTION) 180-240 MG per tablet, Take 1 Tab by mouth every day., Disp: 30 Tab, Rfl: 0  •  acetaminophen-codeine #2 (TYLENOL #2) 300-15 MG Tab, Take 1 Tab by mouth every 6 hours as needed for Severe Pain., Disp: 30 Tab, Rfl: 1  •  triamcinolone acetonide (KENALOG) 0.025 % Cream, Apply 1 Application to affected area(s) 2 times a day., Disp: 1 Tube, Rfl: 0  •  albuterol 108 (90 BASE) MCG/ACT Aero Soln inhalation aerosol, Inhale 2 Puffs by mouth every 6 hours as needed for Shortness of Breath., Disp: 8.5 g, Rfl: 0  •  benzonatate (TESSALON) 200 MG capsule, Take 1 Cap by mouth 3 times a day as needed for Cough for up to 20  doses., Disp: 20 Cap, Rfl: 0  •  calcium acetate (PHOS-LO) 667 MG Cap, Take 1,334 mg by mouth 3 times a day, with meals., Disp: , Rfl:   •  multivitamin (THERAGRAN) Tab, Take 1 Tab by mouth every day., Disp: , Rfl:   •  Fiber Powder, Take  by mouth., Disp: , Rfl:   •  calcium carbonate (TUMS) 500 MG Chew Tab, Take 500 mg by mouth every day., Disp: , Rfl:   •  metoprolol SR (TOPROL XL) 50 MG TABLET SR 24 HR, TAKE 1 TABLET BY MOUTH EVERY DAY., Disp: 90 Tab, Rfl: 3  •  rosuvastatin (CRESTOR) 10 MG Tab, TAKE 1 TAB BY MOUTH EVERY DAY., Disp: 90 Tab, Rfl: 3  •  levothyroxine (SYNTHROID) 75 MCG Tab, TAKE 1 TAB BY MOUTH EVERY DAY., Disp: 90 Tab, Rfl: 3  •  lisinopril (PRINIVIL, ZESTRIL) 40 MG tablet, TAKE 1 TAB BY MOUTH EVERY DAY., Disp: 90 Tab, Rfl: 3  •  hydrochlorothiazide (HYDRODIURIL) 25 MG Tab, TAKE 1 TAB BY MOUTH EVERY DAY., Disp: 90 Tab, Rfl: 3  •  Calcium 500 MG Chew Tab, Take 1,000 mg by mouth every day., Disp: , Rfl:   •  Multiple Vitamins-Minerals (MULTI-VITAMIN GUMMIES PO), Take  by mouth., Disp: , Rfl:       Allergies:  Aleve; Latex; Naproxen sodium; Other misc; Skelaxin; and Sunscreens        Review of Systems   Neurological: Positive for dizziness.   Constitutional: Negative for fever, chills and malaise/fatigue.   HENT: Negative for congestion and sore throat.    Eyes: Negative for blurred vision, double vision and photophobia.   Respiratory: Negative for cough and shortness of breath.    Cardiovascular: Negative for chest pain and palpitations.   Gastrointestinal: Negative for heartburn, nausea, vomiting, abdominal pain, diarrhea and constipation.   Genitourinary: Negative for dysuria and flank pain.   Musculoskeletal: Negative for joint pain and myalgias.   Skin: Negative for itching and rash.   Endo/Heme/Allergies: Does not bruise/bleed easily.   Psychiatric/Behavioral: Negative for depression. The patient is not nervous/anxious.             Objective:     /76 mmHg  Pulse 72  Temp(Src) 36.9 °C  (98.5 °F)  Resp 16  Wt 103.42 kg (228 lb)  SpO2 98%     Physical Exam    Constitutional: PT is oriented to person, place, and time. PT appears well-developed and well-nourished. No distress.   HENT:   Head: Normocephalic and atraumatic.   Right Ear: Hearing,  external ear and ear canal normal. +congestion and bulging TM but no signs of infection. Darrell patel pike +on right  Left Ear: Hearing, tympanic membrane, external ear and ear canal normal.   Nose: wnl  Mouth/Throat: Uvula is midline. Mucous membranes are wnl. No oropharyngeal exudate.   Eyes: Conjunctivae normal and EOM are normal. Pupils are equal, round, and reactive to light.   Neck: Normal range of motion. Neck supple. No thyromegaly present.   Cardiovascular: Normal rate, regular rhythm, normal heart sounds and intact distal pulses.  Exam reveals no gallop and no friction rub.    No murmur heard.  Pulmonary/Chest: Effort normal and breath sounds normal. No respiratory distress. PT has no wheezes. PT has no rales. PT exhibits no tenderness.   Abdominal: Soft. Bowel sounds are normal. PT exhibits no distension and no mass. There is no tenderness. There is no rebound and no guarding.   Musculoskeletal: Normal range of motion. PT exhibits no edema and no tenderness.   Lymphadenopathy:     PT has no cervical adenopathy.   Neurological: PT is alert and oriented to person, place, and time. PT displays normal reflexes. No cranial nerve deficit. PT exhibits normal muscle tone. Coordination normal.   Skin: Skin is warm and dry. No rash noted. No erythema.   Psychiatric: PT has a normal mood and affect. PT behavior is normal. Judgment and thought content normal.            Assessment/Plan:     1. BPPV (benign paroxysmal positional vertigo), right    - fexofenadine-pseudoephedrine (ALLEGRA-D ALLERGY & CONGESTION) 180-240 MG per tablet; Take 1 Tab by mouth every day.  Dispense: 30 Tab; Refill: 0    2. Dizziness    - meclizine (ANTIVERT) 25 MG Tab; Take 1 Tab by mouth  3 times a day as needed.  Dispense: 30 Tab; Refill: 0    STRICT ER precautions discussed  Rest, fluids encouraged.  AVS with medical info given.  Pt was in full understanding and agreement with the plan.  Follow-up as needed if symptoms worsen or fail to improve.

## 2017-03-17 NOTE — MR AVS SNAPSHOT
Prince Garciar   3/17/2017 10:45 AM   Office Visit   MRN: [id number]    Department:  [facility]   Dept Phone:  [phone]    Description:  Female : 1950   Provider:  Warren Mckay PA-C           Reason for Visit     Dizziness started this morning, tdap on tuesday, new meds for rash on       Allergies as of 3/17/2017     Allergen Noted Reactions    Aleve [Gnp Naproxen Sodium] 10/12/2011       Latex 10/12/2011   Rash    Naproxen Sodium 06/15/2008   Hives    Other Misc 10/12/2011   Swelling    Some soap & toothpaste    Skelaxin [Metaxalone] 06/15/2008   Hives    Sunscreens 2015         You were diagnosed with     BPPV (benign paroxysmal positional vertigo), right   [[id number]]  -  Primary     Dizziness   [2015]         Vital Signs     Blood Pressure Pulse Temperature Respirations Weight Oxygen Saturation    122/76 mmHg 72 36.9 °C (98.5 °F) 16 103.42 kg (228 lb) 98%    Smoking Status                   Never Smoker            Basic Information     Date Of Birth Sex Race Ethnicity Preferred Language    1950 Female White Non- English      Your appointments     Apr 10, 2017  3:40 PM   Follow UP with CHLOE Meredith   [facility] (--)    [address] [place] [id number] [phone]              Problem List              ICD-10-CM Priority Class Noted - Resolved    Anxiety F41.9 Low  2013 - Present    GERD (gastroesophageal reflux disease) K21.9 Medium  2013 - Present    Hypothyroid E03.9 Medium  2013 - Present    Dyslipidemia E78.5 High  2013 - Present    HTN (hypertension) I10 High  2013 - Present    CKD (chronic kidney disease) stage 3, GFR 30-59 ml/min N18.3 Medium  2015 - Present    Chronic lower back pain M54.5, G89.29 Medium  2015 - Present    Chronic pain of right knee M25.561, G89.29 Medium  2015 - Present    Bleeding hemorrhoid K64.9   2016 - Present    Chest pain R07.9 High  4/21/2016 - Present    Cervical spine pain M54.2   6/2/2016 - Present    Thoracic spine pain M54.6   6/2/2016 - Present    Sleep apnea in adult G47.33   7/14/2016 - Present    Increased BMI R63.8   10/25/2016 - Present    Morbid obesity with BMI of 40.0-44.9, adult (HCC) E66.01, Z68.41   3/14/2017 - Present    Rash of face R21   3/14/2017 - Present      Health Maintenance        Date Due Completion Dates    PAP SMEAR 10/1/2015 10/1/2012 (N/S)    Override on 10/1/2012: (N/S)    IMM INFLUENZA (1) 9/1/2016 11/17/2014, 11/11/2013    MAMMOGRAM 10/7/2017 10/7/2016, 9/2/2015, 8/25/2014, 8/21/2013, 8/20/2012, 8/17/2011, 8/16/2010, 8/16/2010, 8/13/2009, 8/13/2009, 7/23/2008, 7/23/2008, 7/19/2007, 7/19/2007, 3/6/2007, 8/22/2006, 8/15/2006, 8/12/2005, 8/11/2004    COLONOSCOPY 4/1/2019 4/1/2009 (N/S)    Override on 4/1/2009: (N/S)    IMM DTaP/Tdap/Td Vaccine (2 - Td) 3/14/2027 3/14/2017, 4/13/2000            Current Immunizations     Influenza TIV (IM) 11/11/2013  3:05 PM    Influenza Vaccine Quad Inj (Preserved) 11/17/2014  3:00 PM    SHINGLES VACCINE 11/11/2013  3:04 PM    TD Vaccine 4/13/2000    Tdap Vaccine 3/14/2017  8:13 AM      Below and/or attached are the medications your provider expects you to take. Review all of your home medications and newly ordered medications with your provider and/or pharmacist. Follow medication instructions as directed by your provider and/or pharmacist. Please keep your medication list with you and share with your provider. Update the information when medications are discontinued, doses are changed, or new medications (including over-the-counter products) are added; and carry medication information at all times in the event of emergency situations     Allergies:  ALEVE - (reactions not documented)     LATEX - Rash     NAPROXEN SODIUM - Hives     OTHER MISC - Swelling     SKELAXIN - Hives     SUNSCREENS - (reactions not documented)               Medications  Valid as  of: March 17, 2017 - 11:31 AM    Generic Name Brand Name Tablet Size Instructions for use    Acetaminophen-Codeine (Tab) TYLENOL #2 300-15 MG Take 1 Tab by mouth every 6 hours as needed for Severe Pain.        Albuterol Sulfate (Aero Soln) albuterol 108 (90 BASE) MCG/ACT Inhale 2 Puffs by mouth every 6 hours as needed for Shortness of Breath.        Benzonatate (Cap) TESSALON 200 MG Take 1 Cap by mouth 3 times a day as needed for Cough for up to 20 doses.        Calcium Acetate (Phos Binder) (Cap) PHOS- MG Take 1,334 mg by mouth 3 times a day, with meals.        Calcium Carbonate (Chew Tab) Calcium 500 MG Take 1,000 mg by mouth every day.        Calcium Carbonate Antacid (Chew Tab) TUMS 500 MG Take 500 mg by mouth every day.        Fexofenadine-Pseudoephedrine (TABLET SR 24 HR) ALLEGRA-D 24 180-240 MG Take 1 Tab by mouth every day.        Fiber (Powder) Fiber  Take  by mouth.        HydroCHLOROthiazide (Tab) HYDRODIURIL 25 MG TAKE 1 TAB BY MOUTH EVERY DAY.        Levothyroxine Sodium (Tab) SYNTHROID 75 MCG TAKE 1 TAB BY MOUTH EVERY DAY.        Lisinopril (Tab) PRINIVIL, ZESTRIL 40 MG TAKE 1 TAB BY MOUTH EVERY DAY.        Meclizine HCl (Tab) ANTIVERT 25 MG Take 1 Tab by mouth 3 times a day as needed.        Metoprolol Succinate (TABLET SR 24 HR) TOPROL XL 50 MG TAKE 1 TABLET BY MOUTH EVERY DAY.        Multiple Vitamin (Tab) THERAGRAN  Take 1 Tab by mouth every day.        Multiple Vitamins-Minerals   Take  by mouth.        Rosuvastatin Calcium (Tab) CRESTOR 10 MG TAKE 1 TAB BY MOUTH EVERY DAY.        Triamcinolone Acetonide (Cream) KENALOG 0.025 % Apply 1 Application to affected area(s) 2 times a day.        .                 Medicines prescribed today were sent to:     [id number] - [place] - [address]    [address] [address]    Phone: [phone] Fax: [phone]    Open 24 Hours?: No      Medication refill instructions:       If your prescription bottle indicates you have  medication refills left, it is not necessary to call your provider’s office. Please contact your pharmacy and they will refill your medication.    If your prescription bottle indicates you do not have any refills left, you may request refills at any time through one of the following ways: The online [employer] system (except Urgent Care), by calling your provider’s office, or by asking your pharmacy to contact your provider’s office with a refill request. Medication refills are processed only during regular business hours and may not be available until the next business day. Your provider may request additional information or to have a follow-up visit with you prior to refilling your medication.   *Please Note: Medication refills are assigned a new Rx number when refilled electronically. Your pharmacy may indicate that no refills were authorized even though a new prescription for the same medication is available at the pharmacy. Please request the medicine by name with the pharmacy before contacting your provider for a refill.        Instructions    Benign Positional Vertigo  Vertigo means you feel like you or your surroundings are moving when they are not. Benign positional vertigo is the most common form of vertigo. Benign means that the cause of your condition is not serious. Benign positional vertigo is more common in older adults.  CAUSES   Benign positional vertigo is the result of an upset in the labyrinth system. This is an area in the middle ear that helps control your balance. This may be caused by a viral infection, head injury, or repetitive motion. However, often no specific cause is found.  SYMPTOMS   Symptoms of benign positional vertigo occur when you move your head or eyes in different directions. Some of the symptoms may include:  · Loss of balance and falls.  · Vomiting.  · Blurred vision.  · Dizziness.  · Nausea.  · Involuntary eye movements (nystagmus).  DIAGNOSIS   Benign positional vertigo is  usually diagnosed by physical exam. If the specific cause of your benign positional vertigo is unknown, your caregiver may perform imaging tests, such as magnetic resonance imaging (MRI) or computed tomography (CT).  TREATMENT   Your caregiver may recommend movements or procedures to correct the benign positional vertigo. Medicines such as meclizine, benzodiazepines, and medicines for nausea may be used to treat your symptoms. In rare cases, if your symptoms are caused by certain conditions that affect the inner ear, you may need surgery.  HOME CARE INSTRUCTIONS   · Follow your caregiver's instructions.  · Move slowly. Do not make sudden body or head movements.  · Avoid driving.  · Avoid operating heavy machinery.  · Avoid performing any tasks that would be dangerous to you or others during a vertigo episode.  · Drink enough fluids to keep your urine clear or pale yellow.  SEEK IMMEDIATE MEDICAL CARE IF:   · You develop problems with walking, weakness, numbness, or using your arms, hands, or legs.  · You have difficulty speaking.  · You develop severe headaches.  · Your nausea or vomiting continues or gets worse.  · You develop visual changes.  · Your family or friends notice any behavioral changes.  · Your condition gets worse.  · You have a fever.  · You develop a stiff neck or sensitivity to light.  MAKE SURE YOU:   · Understand these instructions.  · Will watch your condition.  · Will get help right away if you are not doing well or get worse.     This information is not intended to replace advice given to you by your health care provider. Make sure you discuss any questions you have with your health care provider.     Document Released: 09/25/2007 Document Revised: 03/11/2013 Document Reviewed: 04/11/2016  [employer] Interactive Patient Education ©2016 [employer] Inc[employer]            [employer] Access Code: Activation code not generated  Current [employer] Status: Active

## 2017-04-04 ENCOUNTER — HOSPITAL ENCOUNTER (OUTPATIENT)
Dept: LAB | Facility: MEDICAL CENTER | Age: 67
End: 2017-04-04
Attending: FAMILY MEDICINE
Payer: COMMERCIAL

## 2017-04-04 DIAGNOSIS — I10 ESSENTIAL HYPERTENSION: ICD-10-CM

## 2017-04-04 DIAGNOSIS — E03.9 HYPOTHYROIDISM, UNSPECIFIED TYPE: ICD-10-CM

## 2017-04-04 DIAGNOSIS — N18.30 CKD (CHRONIC KIDNEY DISEASE) STAGE 3, GFR 30-59 ML/MIN (HCC): ICD-10-CM

## 2017-04-04 DIAGNOSIS — E78.5 DYSLIPIDEMIA: ICD-10-CM

## 2017-04-04 LAB
25(OH)D3 SERPL-MCNC: 26 NG/ML (ref 30–100)
ALBUMIN SERPL BCP-MCNC: 4.2 G/DL (ref 3.2–4.9)
ALBUMIN/GLOB SERPL: 1.4 G/DL
ALP SERPL-CCNC: 84 U/L (ref 30–99)
ALT SERPL-CCNC: 14 U/L (ref 2–50)
ANION GAP SERPL CALC-SCNC: 7 MMOL/L (ref 0–11.9)
AST SERPL-CCNC: 17 U/L (ref 12–45)
BASOPHILS # BLD AUTO: 0.9 % (ref 0–1.8)
BASOPHILS # BLD: 0.06 K/UL (ref 0–0.12)
BILIRUB SERPL-MCNC: 0.8 MG/DL (ref 0.1–1.5)
BUN SERPL-MCNC: 26 MG/DL (ref 8–22)
CALCIUM SERPL-MCNC: 10.2 MG/DL (ref 8.5–10.5)
CHLORIDE SERPL-SCNC: 101 MMOL/L (ref 96–112)
CHOLEST SERPL-MCNC: 162 MG/DL (ref 100–199)
CO2 SERPL-SCNC: 27 MMOL/L (ref 20–33)
CREAT SERPL-MCNC: 1.19 MG/DL (ref 0.5–1.4)
EOSINOPHIL # BLD AUTO: 0.14 K/UL (ref 0–0.51)
EOSINOPHIL NFR BLD: 2.1 % (ref 0–6.9)
ERYTHROCYTE [DISTWIDTH] IN BLOOD BY AUTOMATED COUNT: 43.8 FL (ref 35.9–50)
GFR SERPL CREATININE-BSD FRML MDRD: 46 ML/MIN/1.73 M 2
GLOBULIN SER CALC-MCNC: 3.1 G/DL (ref 1.9–3.5)
GLUCOSE SERPL-MCNC: 96 MG/DL (ref 65–99)
HCT VFR BLD AUTO: 38.7 % (ref 37–47)
HDLC SERPL-MCNC: 59 MG/DL
HGB BLD-MCNC: 13 G/DL (ref 12–16)
IMM GRANULOCYTES # BLD AUTO: 0.01 K/UL (ref 0–0.11)
IMM GRANULOCYTES NFR BLD AUTO: 0.1 % (ref 0–0.9)
LDLC SERPL CALC-MCNC: 83 MG/DL
LYMPHOCYTES # BLD AUTO: 1.06 K/UL (ref 1–4.8)
LYMPHOCYTES NFR BLD: 15.8 % (ref 22–41)
MCH RBC QN AUTO: 30.9 PG (ref 27–33)
MCHC RBC AUTO-ENTMCNC: 33.6 G/DL (ref 33.6–35)
MCV RBC AUTO: 91.9 FL (ref 81.4–97.8)
MONOCYTES # BLD AUTO: 0.52 K/UL (ref 0–0.85)
MONOCYTES NFR BLD AUTO: 7.7 % (ref 0–13.4)
NEUTROPHILS # BLD AUTO: 4.93 K/UL (ref 2–7.15)
NEUTROPHILS NFR BLD: 73.4 % (ref 44–72)
NRBC # BLD AUTO: 0 K/UL
NRBC BLD AUTO-RTO: 0 /100 WBC
PLATELET # BLD AUTO: 232 K/UL (ref 164–446)
PMV BLD AUTO: 10 FL (ref 9–12.9)
POTASSIUM SERPL-SCNC: 4.1 MMOL/L (ref 3.6–5.5)
PROT SERPL-MCNC: 7.3 G/DL (ref 6–8.2)
RBC # BLD AUTO: 4.21 M/UL (ref 4.2–5.4)
SODIUM SERPL-SCNC: 135 MMOL/L (ref 135–145)
TRIGL SERPL-MCNC: 102 MG/DL (ref 0–149)
TSH SERPL DL<=0.005 MIU/L-ACNC: 1.5 UIU/ML (ref 0.3–3.7)
WBC # BLD AUTO: 6.7 K/UL (ref 4.8–10.8)

## 2017-04-04 PROCEDURE — 84443 ASSAY THYROID STIM HORMONE: CPT

## 2017-04-04 PROCEDURE — 80053 COMPREHEN METABOLIC PANEL: CPT

## 2017-04-04 PROCEDURE — 36415 COLL VENOUS BLD VENIPUNCTURE: CPT

## 2017-04-04 PROCEDURE — 80061 LIPID PANEL: CPT

## 2017-04-04 PROCEDURE — 82306 VITAMIN D 25 HYDROXY: CPT

## 2017-04-04 PROCEDURE — 85025 COMPLETE CBC W/AUTO DIFF WBC: CPT

## 2017-04-05 ENCOUNTER — TELEPHONE (OUTPATIENT)
Dept: MEDICAL GROUP | Facility: MEDICAL CENTER | Age: 67
End: 2017-04-05

## 2017-04-05 NOTE — TELEPHONE ENCOUNTER
----- Message from Joao Ireland M.D. sent at 4/5/2017  7:11 AM PDT -----  Please notify patient that kidney function is stable.  Vitamin D is a little low, have her increase vitamin D by 2000 units daily.  Otherwise, blood counts, blood sugar, liver function, thyroid and cholesterol are normal.  Joao Ireladn M.D.

## 2017-04-10 ENCOUNTER — SLEEP CENTER VISIT (OUTPATIENT)
Dept: SLEEP MEDICINE | Facility: MEDICAL CENTER | Age: 67
End: 2017-04-10
Payer: COMMERCIAL

## 2017-04-10 VITALS
TEMPERATURE: 97.9 F | WEIGHT: 216 LBS | HEART RATE: 76 BPM | SYSTOLIC BLOOD PRESSURE: 140 MMHG | OXYGEN SATURATION: 96 % | BODY MASS INDEX: 39.75 KG/M2 | HEIGHT: 62 IN | DIASTOLIC BLOOD PRESSURE: 70 MMHG | RESPIRATION RATE: 16 BRPM

## 2017-04-10 DIAGNOSIS — G47.33 OSA (OBSTRUCTIVE SLEEP APNEA): ICD-10-CM

## 2017-04-10 PROCEDURE — 99213 OFFICE O/P EST LOW 20 MIN: CPT | Performed by: NURSE PRACTITIONER

## 2017-04-10 RX ORDER — POLYETHYLENE GLYCOL 3350 17 G/17G
17 POWDER, FOR SOLUTION ORAL DAILY
COMMUNITY

## 2017-04-10 NOTE — PATIENT INSTRUCTIONS
1) Continue CPAP at [id number]  2) Clean mask and supplies weekly and change them as insurance allows  3) Return in about 4 weeks (around 5/8/2017) for Compliance, review of symptoms, if not sooner, follow up with JD Luke.

## 2017-04-10 NOTE — MR AVS SNAPSHOT
"        Prince Mazariegos   4/10/2017 3:40 PM   Sleep Center Visit   MRN: [id number]    Department:  [facility]   Dept Phone:  [phone]    Description:  Female : 1950   Provider:  CHLOE Meredith           Reason for Visit     Apnea Last seen 16       Allergies as of 4/10/2017     Allergen Noted Reactions    Aleve [Gnp Naproxen Sodium] 10/12/2011       Latex 10/12/2011   Rash    Naproxen Sodium 06/15/2008   Hives    Other Misc 10/12/2011   Swelling    Some soap & toothpaste    Skelaxin [Metaxalone] 06/15/2008   Hives    Sunscreens 2015         Vital Signs     Blood Pressure Pulse Temperature Respirations Height Weight    140/70 mmHg 76 36.6 °C (97.9 °F) 16 1.575 m (5' 2.01\") 97.977 kg (216 lb)    Body Mass Index Oxygen Saturation Smoking Status             39.50 kg/m2 96% Never Smoker          Basic Information     Date Of Birth Sex Race Ethnicity Preferred Language    1950 Female White Non- English      Your appointments     May 10, 2017  2:40 PM   Established Patient Pul with CHLOE Meredith   [facility] (--)    [address] [place] [address] [address]  [facility] [id number] [phone]              Problem List              ICD-10-CM Priority Class Noted - Resolved    Anxiety F41.9 Low  2013 - Present    GERD (gastroesophageal reflux disease) K21.9 Medium  2013 - Present    Hypothyroid E03.9 Medium  2013 - Present    Dyslipidemia E78.5 High  2013 - Present    HTN (hypertension) I10 High  2013 - Present    CKD (chronic kidney disease) stage 3, GFR 30-59 ml/min N18.3 Medium  2015 - Present    Chronic lower back pain M54.5, G89.29 Medium  2015 - Present    Chronic pain of right knee M25.561, G89.29 Medium  2015 - Present    Bleeding hemorrhoid K64.9   2016 - Present    Chest pain R07.9 High  2016 - Present    Cervical spine pain M54.2   2016 - Present    Thoracic spine pain M54.6   " 6/2/2016 - Present    Sleep apnea in adult G47.33   7/14/2016 - Present    Increased BMI R63.8   10/25/2016 - Present    Morbid obesity with BMI of 40.0-44.9, adult ([facility]) E66.01, Z68.41   3/14/2017 - Present    Rash of face R21   3/14/2017 - Present      Health Maintenance        Date Due Completion Dates    PAP SMEAR 10/1/2015 10/1/2012 (N/S)    Override on 10/1/2012: (N/S)    MAMMOGRAM 10/7/2017 10/7/2016, 9/2/2015, 8/25/2014, 8/21/2013, 8/20/2012, 8/17/2011, 8/16/2010, 8/16/2010, 8/13/2009, 8/13/2009, 7/23/2008, 7/23/2008, 7/19/2007, 7/19/2007, 3/6/2007, 8/22/2006, 8/15/2006, 8/12/2005, 8/11/2004    COLONOSCOPY 4/1/2019 4/1/2009 (N/S)    Override on 4/1/2009: (N/S)    IMM DTaP/Tdap/Td Vaccine (2 - Td) 3/14/2027 3/14/2017, 4/13/2000            Current Immunizations     Influenza TIV (IM) 11/11/2013  3:05 PM    Influenza Vaccine Quad Inj (Preserved) 11/17/2014  3:00 PM    SHINGLES VACCINE 11/11/2013  3:04 PM    TD Vaccine 4/13/2000    Tdap Vaccine 3/14/2017  8:13 AM      Below and/or attached are the medications your provider expects you to take. Review all of your home medications and newly ordered medications with your provider and/or pharmacist. Follow medication instructions as directed by your provider and/or pharmacist. Please keep your medication list with you and share with your provider. Update the information when medications are discontinued, doses are changed, or new medications (including over-the-counter products) are added; and carry medication information at all times in the event of emergency situations     Allergies:  ALEVE - (reactions not documented)     LATEX - Rash     NAPROXEN SODIUM - Hives     OTHER MISC - Swelling     SKELAXIN - Hives     SUNSCREENS - (reactions not documented)               Medications  Valid as of: April 10, 2017 -  4:03 PM    Generic Name Brand Name Tablet Size Instructions for use    Acetaminophen-Codeine (Tab) TYLENOL #2 300-15 MG Take 1 Tab by mouth every 6 hours as  needed for Severe Pain.        Albuterol Sulfate (Aero Soln) albuterol 108 (90 BASE) MCG/ACT Inhale 2 Puffs by mouth every 6 hours as needed for Shortness of Breath.        Calcium Acetate (Phos Binder) (Cap) PHOS- MG Take 1,334 mg by mouth 3 times a day, with meals.        Calcium Carbonate (Chew Tab) Calcium 500 MG Take 1,000 mg by mouth every day.        Calcium Carbonate Antacid (Chew Tab) TUMS 500 MG Take 500 mg by mouth every day.        Fiber (Powder) Fiber  Take  by mouth.        HydroCHLOROthiazide (Tab) HYDRODIURIL 25 MG TAKE 1 TAB BY MOUTH EVERY DAY.        Levothyroxine Sodium (Tab) SYNTHROID 75 MCG TAKE 1 TAB BY MOUTH EVERY DAY.        Lisinopril (Tab) PRINIVIL, ZESTRIL 40 MG TAKE 1 TAB BY MOUTH EVERY DAY.        Meclizine HCl (Tab) ANTIVERT 25 MG Take 1 Tab by mouth 3 times a day as needed.        Metoprolol Succinate (TABLET SR 24 HR) TOPROL XL 50 MG TAKE 1 TABLET BY MOUTH EVERY DAY.        Multiple Vitamin (Tab) THERAGRAN  Take 1 Tab by mouth every day.        Multiple Vitamins-Minerals   Take  by mouth.        Polyethylene Glycol 3350 (Powder) MIRALAX  Take 17 g by mouth every day.        Rosuvastatin Calcium (Tab) CRESTOR 10 MG TAKE 1 TAB BY MOUTH EVERY DAY.        Triamcinolone Acetonide (Cream) KENALOG 0.025 % Apply 1 Application to affected area(s) 2 times a day.        .                 Medicines prescribed today were sent to:     [facility]ARMACY #[id number] - [place] - [address]    [address] [address]    Phone: [phone] Fax: [phone]    Open 24 Hours?: No    [facility]    [address] [facility] [address]    Phone: [phone] Fax: [phone]      Medication refill instructions:       If your prescription bottle indicates you have medication refills left, it is not necessary to call your provider’s office. Please contact your pharmacy and they will refill your medication.    If your prescription bottle indicates you do not have any refills left, you  may request refills at any time through one of the following ways: The online [employer] system (except Urgent Care), by calling your provider’s office, or by asking your pharmacy to contact your provider’s office with a refill request. Medication refills are processed only during regular business hours and may not be available until the next business day. Your provider may request additional information or to have a follow-up visit with you prior to refilling your medication.   *Please Note: Medication refills are assigned a new Rx number when refilled electronically. Your pharmacy may indicate that no refills were authorized even though a new prescription for the same medication is available at the pharmacy. Please request the medicine by name with the pharmacy before contacting your provider for a refill.        Instructions    1) Continue CPAP at [id number]  2) Clean mask and supplies weekly and change them as insurance allows  3) Return in about 4 weeks (around 5/8/2017) for Compliance, review of symptoms, if not sooner, follow up with JD Luke.              [employer] Access Code: Activation code not generated  Current [employer] Status: Active

## 2017-04-10 NOTE — PROGRESS NOTES
CC:  Here for f/u sleep issues as listed below    HPI:   Prince presents today for follow up obstructive  apnea. HSS from June 2016 showed an AHI of 12.2 with low oxygenation of 76%. Patient completed a titration study 11-20 16 that was successful titration to a CPAP pressure of 10 with a reduced AHI, correction of low oxygenation, and REM rebound. Currently she is being treated with CPAP @ [id number].  Compliance download from the dates 3/11/2017 - 4/9/2017 indicates she is wearing the device 100% for an avg of 9 hours and 31 minutes per night with a reduced AHI of 0.7.  She does tolerate pressure and mask well.  She wakes up refreshed and denies morning H/A. she sleeps better overall and not not naping like she used to. she will continue to clean supplies weekly and change them as insurance allows.       Patient Active Problem List    Diagnosis Date Noted   • Chest pain 04/21/2016     Priority: High   • HTN (hypertension) 11/11/2013     Priority: High   • Dyslipidemia 09/30/2013     Priority: High   • CKD (chronic kidney disease) stage 3, GFR 30-59 ml/min 09/16/2015     Priority: Medium   • Chronic lower back pain 09/16/2015     Priority: Medium   • Chronic pain of right knee 09/16/2015     Priority: Medium   • GERD (gastroesophageal reflux disease) 09/30/2013     Priority: Medium   • Hypothyroid 09/30/2013     Priority: Medium   • Anxiety 09/30/2013     Priority: Low   • GENIA (obstructive sleep apnea) 04/10/2017   • Morbid obesity with BMI of 40.0-44.9, adult (HCC) 03/14/2017   • Rash of face 03/14/2017   • Increased BMI 10/25/2016   • Cervical spine pain 06/02/2016   • Thoracic spine pain 06/02/2016   • Bleeding hemorrhoid 02/12/2016       Past Medical History   Diagnosis Date   • Enlarged tonsils      Removed with adenoids   • Hypertension      Medicated   • Psychiatric disorder      Anxiety - medicated   • High cholesterol    • Arrhythmia    • GERD (gastroesophageal reflux disease)    • Arthritis    • Anesthesia       PONV   • Skin cancer      skin   • Chest pain May 2016     PET scan with no infarct or ischemia, normal LVEF.   • DDD (degenerative disc disease), cervical    • Hemorrhoids      bleeding   • Back pain    • Obesity    • Chickenpox    • Portuguese measles        Past Surgical History   Procedure Laterality Date   • Other cardiac surgery       Cath - 4-5 years ago with ba   • Lumpectomy Right 2007     Breast lump removed   • Hysteroscopy with video diagnostic  10/7/08     [facility]   • Dilation and curettage  10/7/08     [facility]   • Carpal tunnel endoscopic  10/17/2011     Performed by EDGAR GARCIA at [facility]   • Carpal tunnel endoscopic  10/28/2011     Performed by EDGAR GARCIA at [facility]   • Other       hemorrhoid banding   • Tonsillectomy     • Carpal tunnel release         Family History   Problem Relation Age of Onset   • Cancer Mother    • Cancer Sister    • Cancer Maternal Aunt    • Cancer Other    • Cancer Paternal Grandfather      liver   • Heart Disease Neg Hx        Social History     Social History   • Marital Status: Single     Spouse Name: N/A   • Number of Children: N/A   • Years of Education: N/A     Occupational History   • Not on file.     Social History Main Topics   • Smoking status: Never Smoker    • Smokeless tobacco: Never Used   • Alcohol Use: No   • Drug Use: No   • Sexual Activity: Not on file     Other Topics Concern   • Not on file     Social History Narrative       Current Outpatient Prescriptions   Medication Sig Dispense Refill   • polyethylene glycol 3350 (MIRALAX) Powder Take 17 g by mouth every day.     • meclizine (ANTIVERT) 25 MG Tab Take 1 Tab by mouth 3 times a day as needed. 30 Tab 0   • acetaminophen-codeine #2 (TYLENOL #2) 300-15 MG Tab Take 1 Tab by mouth every 6 hours as needed for Severe Pain. 30 Tab  "1   • triamcinolone acetonide (KENALOG) 0.025 % Cream Apply 1 Application to affected area(s) 2 times a day. 1 Tube 0   • albuterol 108 (90 BASE) MCG/ACT Aero Soln inhalation aerosol Inhale 2 Puffs by mouth every 6 hours as needed for Shortness of Breath. 8.5 g 0   • calcium acetate (PHOS-LO) 667 MG Cap Take 1,334 mg by mouth 3 times a day, with meals.     • multivitamin (THERAGRAN) Tab Take 1 Tab by mouth every day.     • Fiber Powder Take  by mouth.     • calcium carbonate (TUMS) 500 MG Chew Tab Take 500 mg by mouth every day.     • metoprolol SR (TOPROL XL) 50 MG TABLET SR 24 HR TAKE 1 TABLET BY MOUTH EVERY DAY. 90 Tab 3   • rosuvastatin (CRESTOR) 10 MG Tab TAKE 1 TAB BY MOUTH EVERY DAY. 90 Tab 3   • levothyroxine (SYNTHROID) 75 MCG Tab TAKE 1 TAB BY MOUTH EVERY DAY. 90 Tab 3   • lisinopril (PRINIVIL, ZESTRIL) 40 MG tablet TAKE 1 TAB BY MOUTH EVERY DAY. 90 Tab 3   • hydrochlorothiazide (HYDRODIURIL) 25 MG Tab TAKE 1 TAB BY MOUTH EVERY DAY. 90 Tab 3   • Calcium 500 MG Chew Tab Take 1,000 mg by mouth every day.     • Multiple Vitamins-Minerals (MULTI-VITAMIN GUMMIES PO) Take  by mouth.       No current facility-administered medications for this visit.          Allergies: Aleve; Latex; Naproxen sodium; Other misc; Skelaxin; and Sunscreens      ROS   Gen: Denies fever, chills, unintentional weight loss, fatigue  Resp:Denies Dyspnea  CV: Denies chest pain, chest tightness  Sleep:Denies morning headache, insomnia, daytime somnolence, snoring, gasping for air, apnea  Neuro: Denies frequent headaches, weakness, dizziness  See HPI.  All other systems reviewed and negative        Vital signs for this encounter:  Filed Vitals:    04/10/17 1532   Height: 1.575 m (5' 2.01\")   Weight: 97.977 kg (216 lb)   Weight % change since last entry.: 0 %   BP: 140/70   Pulse: 76   BMI (Calculated): 39.5   Resp: 16   Temp: 36.6 °C (97.9 °F)   O2 sat % room air: 96 %                   Physical Exam:   Gen:         Alert and oriented, No " apparent distress.   Neck:        No Lymphadenopathy.  Lungs:     Clear to auscultation bilaterally.    CV:          Regular rate and rhythm. No murmurs, rubs or gallops.   Abd:         Soft non tender, non distended.            Ext:          No clubbing, cyanosis, edema.    Assessment   1. GENIA (obstructive sleep apnea)         PLAN:   Patient Instructions   1) Continue CPAP at [id number]  2) Clean mask and supplies weekly and change them as insurance allows  3) Return in about 4 weeks (around 5/8/2017) for Compliance, review of symptoms, if not sooner, follow up with JD Luke. - needs to return after she switches over to Medicare

## 2017-04-11 RX ORDER — METOPROLOL SUCCINATE 50 MG/1
50 TABLET, EXTENDED RELEASE ORAL
Qty: 90 TAB | Refills: 3 | Status: SHIPPED | OUTPATIENT
Start: 2017-04-11 | End: 2018-04-18 | Stop reason: SDUPTHER

## 2017-04-11 RX ORDER — HYDROCHLOROTHIAZIDE 25 MG/1
25 TABLET ORAL
Qty: 90 TAB | Refills: 3 | Status: SHIPPED | OUTPATIENT
Start: 2017-04-11 | End: 2018-04-18 | Stop reason: SDUPTHER

## 2017-04-11 RX ORDER — LEVOTHYROXINE SODIUM 0.07 MG/1
75 TABLET ORAL
Qty: 90 TAB | Refills: 3 | Status: SHIPPED | OUTPATIENT
Start: 2017-04-11 | End: 2018-04-18 | Stop reason: SDUPTHER

## 2017-04-11 RX ORDER — ROSUVASTATIN CALCIUM 10 MG/1
10 TABLET, COATED ORAL
Qty: 90 TAB | Refills: 3 | Status: SHIPPED | OUTPATIENT
Start: 2017-04-11 | End: 2018-04-18 | Stop reason: SDUPTHER

## 2017-04-11 RX ORDER — LISINOPRIL 40 MG/1
40 TABLET ORAL
Qty: 90 TAB | Refills: 3 | Status: SHIPPED | OUTPATIENT
Start: 2017-04-11 | End: 2018-04-18 | Stop reason: SDUPTHER

## 2017-04-11 NOTE — TELEPHONE ENCOUNTER
Change in Pharmacy, New Prescription needed     Was the patient seen in the last year in this department? Yes     Does patient have an active prescription for medications requested? No     Received Request Via: Pharmacy     Last seen: 03/14/2017 Slots

## 2017-04-27 ENCOUNTER — TELEPHONE (OUTPATIENT)
Dept: MEDICAL GROUP | Facility: MEDICAL CENTER | Age: 67
End: 2017-04-27

## 2017-04-27 DIAGNOSIS — M54.2 CERVICAL SPINE PAIN: ICD-10-CM

## 2017-04-27 NOTE — TELEPHONE ENCOUNTER
.1. Caller Name: Pt                      Call Back Number: [phone] (home)     2. Message: Pt called stating she would like another referral placed for for neck for a 2nd opinion  to   Dr Juan Antonio Larsen   Address: [address], [place] [address]  Phone: [phone]    3. Patient approves office to leave a detailed voicemail/MyChart message: yes

## 2017-05-10 ENCOUNTER — OFFICE VISIT (OUTPATIENT)
Dept: PULMONOLOGY | Facility: HOSPICE | Age: 67
End: 2017-05-10
Payer: MEDICARE

## 2017-05-10 VITALS
OXYGEN SATURATION: 96 % | SYSTOLIC BLOOD PRESSURE: 124 MMHG | DIASTOLIC BLOOD PRESSURE: 76 MMHG | WEIGHT: 215 LBS | HEART RATE: 72 BPM | RESPIRATION RATE: 16 BRPM | BODY MASS INDEX: 39.31 KG/M2 | TEMPERATURE: 98.4 F

## 2017-05-10 DIAGNOSIS — G47.33 OSA (OBSTRUCTIVE SLEEP APNEA): ICD-10-CM

## 2017-05-10 PROCEDURE — 99213 OFFICE O/P EST LOW 20 MIN: CPT | Performed by: NURSE PRACTITIONER

## 2017-05-10 PROCEDURE — 1036F TOBACCO NON-USER: CPT | Performed by: NURSE PRACTITIONER

## 2017-05-10 PROCEDURE — G8419 CALC BMI OUT NRM PARAM NOF/U: HCPCS | Performed by: NURSE PRACTITIONER

## 2017-05-10 PROCEDURE — G8432 DEP SCR NOT DOC, RNG: HCPCS | Performed by: NURSE PRACTITIONER

## 2017-05-10 PROCEDURE — 3014F SCREEN MAMMO DOC REV: CPT | Performed by: NURSE PRACTITIONER

## 2017-05-10 NOTE — MR AVS SNAPSHOT
Prince Mazariegos   5/10/2017 2:40 PM   Office Visit   MRN: [id number]    Department:  [facility]   Dept Phone:  [phone]    Description:  Female : 1950   Provider:  CHLOE Meredith           Reason for Visit     Apnea last seen 4/10/17       Allergies as of 5/10/2017     Allergen Noted Reactions    Aleve [Gnp Naproxen Sodium] 10/12/2011       Latex 10/12/2011   Rash    Naproxen Sodium 06/15/2008   Hives    Other Misc 10/12/2011   Swelling    Some soap & toothpaste    Skelaxin [Metaxalone] 06/15/2008   Hives    Sunscreens 2015         You were diagnosed with     GENIA (obstructive sleep apnea)   [[id number]]         Vital Signs     Blood Pressure Pulse Temperature Respirations Weight Oxygen Saturation    124/76 mmHg 72 36.9 °C (98.4 °F) 16 97.523 kg (215 lb) 96%    Smoking Status                   Never Smoker            Basic Information     Date Of Birth Sex Race Ethnicity Preferred Language    1950 Female White Non- English      Your appointments     Nov 15, 2017  8:00 AM   Established Patient Pul with CHLOE Meredith   [facility] Pulmonary Medicine (--)    [address]  [facility] [address] [phone]              Problem List              ICD-10-CM Priority Class Noted - Resolved    Anxiety F41.9 Low  2013 - Present    GERD (gastroesophageal reflux disease) K21.9 Medium  2013 - Present    Hypothyroid E03.9 Medium  2013 - Present    Dyslipidemia E78.5 High  2013 - Present    HTN (hypertension) I10 High  2013 - Present    CKD (chronic kidney disease) stage 3, GFR 30-59 ml/min N18.3 Medium  2015 - Present    Chronic lower back pain M54.5, G89.29 Medium  2015 - Present    Chronic pain of right knee M25.561, G89.29 Medium  2015 - Present    Bleeding hemorrhoid K64.9   2016 - Present    Chest pain R07.9 High  2016 - Present    Cervical spine pain M54.2   2016 - Present    Thoracic spine pain M54.6   6/2/2016 - Present    Increased BMI R63.8   10/25/2016 - Present    Morbid obesity with BMI of 40.0-44.9, adult (HCC) E66.01, Z68.41   3/14/2017 - Present    Rash of face R21   3/14/2017 - Present    GENIA (obstructive sleep apnea) G47.33   4/10/2017 - Present      Health Maintenance        Date Due Completion Dates    PAP SMEAR 10/1/2015 10/1/2012 (N/S)    Override on 10/1/2012: (N/S)    MAMMOGRAM 10/7/2017 10/7/2016, 9/2/2015, 8/25/2014, 8/21/2013, 8/20/2012, 8/17/2011, 8/16/2010, 8/16/2010, 8/13/2009, 8/13/2009, 7/23/2008, 7/23/2008, 7/19/2007, 7/19/2007, 3/6/2007, 8/22/2006, 8/15/2006, 8/12/2005, 8/11/2004    COLONOSCOPY 4/1/2019 4/1/2009 (N/S)    Override on 4/1/2009: (N/S)    IMM DTaP/Tdap/Td Vaccine (2 - Td) 3/14/2027 3/14/2017, 4/13/2000            Current Immunizations     Influenza TIV (IM) 11/11/2013  3:05 PM    Influenza Vaccine Quad Inj (Preserved) 11/17/2014  3:00 PM    SHINGLES VACCINE 11/11/2013  3:04 PM    TD Vaccine 4/13/2000    Tdap Vaccine 3/14/2017  8:13 AM      Below and/or attached are the medications your provider expects you to take. Review all of your home medications and newly ordered medications with your provider and/or pharmacist. Follow medication instructions as directed by your provider and/or pharmacist. Please keep your medication list with you and share with your provider. Update the information when medications are discontinued, doses are changed, or new medications (including over-the-counter products) are added; and carry medication information at all times in the event of emergency situations     Allergies:  ALEVE - (reactions not documented)     LATEX - Rash     NAPROXEN SODIUM - Hives     OTHER MISC - Swelling     SKELAXIN - Hives     SUNSCREENS - (reactions not documented)               Medications  Valid as of: May 10, 2017 -  3:11 PM    Generic Name Brand Name Tablet Size Instructions for use    Acetaminophen-Codeine (Tab) TYLENOL #2 300-15 MG  Take 1 Tab by mouth every 6 hours as needed for Severe Pain.        Albuterol Sulfate (Aero Soln) albuterol 108 (90 BASE) MCG/ACT Inhale 2 Puffs by mouth every 6 hours as needed for Shortness of Breath.        Calcium Acetate (Phos Binder) (Cap) PHOS- MG Take 1,334 mg by mouth 3 times a day, with meals.        Calcium Carbonate (Chew Tab) Calcium 500 MG Take 1,000 mg by mouth every day.        Calcium Carbonate Antacid (Chew Tab) TUMS 500 MG Take 500 mg by mouth every day.        Fiber (Powder) Fiber  Take  by mouth.        HydroCHLOROthiazide (Tab) HYDRODIURIL 25 MG Take 1 Tab by mouth every day.        Levothyroxine Sodium (Tab) SYNTHROID 75 MCG Take 1 Tab by mouth every day.        Lisinopril (Tab) PRINIVIL, ZESTRIL 40 MG Take 1 Tab by mouth every day.        Meclizine HCl (Tab) ANTIVERT 25 MG Take 1 Tab by mouth 3 times a day as needed.        Metoprolol Succinate (TABLET SR 24 HR) TOPROL XL 50 MG Take 1 Tab by mouth every day.        Multiple Vitamin (Tab) THERAGRAN  Take 1 Tab by mouth every day.        Multiple Vitamins-Minerals   Take  by mouth.        Polyethylene Glycol 3350 (Powder) MIRALAX  Take 17 g by mouth every day.        Rosuvastatin Calcium (Tab) CRESTOR 10 MG Take 1 Tab by mouth every day.        Triamcinolone Acetonide (Cream) KENALOG 0.025 % Apply 1 Application to affected area(s) 2 times a day.        .                 Medicines prescribed today were sent to:     [facility] - [place] - [address]    [address] [facility] [address]    Phone: [phone] Fax: [phone]    Open 24 Hours?: No    [facility]    [address] [facility] [address]    Phone: [phone] Fax: [phone]    [facility] - [place], [place] - [address]    [address] [address]    Phone: [phone] Fax: [phone]    Open 24 Hours?: No      Medication refill instructions:       If your prescription bottle indicates you have  medication refills left, it is not necessary to call your provider’s office. Please contact your pharmacy and they will refill your medication.    If your prescription bottle indicates you do not have any refills left, you may request refills at any time through one of the following ways: The online [employer] system (except Urgent Care), by calling your provider’s office, or by asking your pharmacy to contact your provider’s office with a refill request. Medication refills are processed only during regular business hours and may not be available until the next business day. Your provider may request additional information or to have a follow-up visit with you prior to refilling your medication.   *Please Note: Medication refills are assigned a new Rx number when refilled electronically. Your pharmacy may indicate that no refills were authorized even though a new prescription for the same medication is available at the pharmacy. Please request the medicine by name with the pharmacy before contacting your provider for a refill.        Instructions    1) Continue CPAP at [id number]  2) Clean mask and supplies weekly and change them as insurance allows  3) Return in about 6 months (around 11/10/2017) for Compliance, review of symptoms, if not sooner, follow up with JD Luke.              [employer] Access Code: Activation code not generated  Current [employer] Status: Active

## 2017-05-10 NOTE — PROGRESS NOTES
CC:  Here for f/u sleep issues as listed below    HPI:   Prince presents today for follow up obstructive  apnea. HSS from June 2016 showed an AHI of 12.2 with low oxygenation of 76%. Patient completed a titration study  that was successful titration to a CPAP pressure of 10 with a reduced AHI, correction of low oxygenation, and REM rebound. Currently she is being treated with CPAP @ [id number].   Compliance download from the dates 4/11/2017 - 5/10/2017 indicates she is wearing the device 9% for an avg of 10 hours and 10 minutes per night with a reduced AHI of 0.9.  She does tolerate pressure and mask well.  She wakes up refreshed and denies morning H/A. she sleeps better overall and not not naping like she used to. she will continue to clean supplies weekly and change them as insurance allows. She is having red patches on her cheeks and forehead. They do not itch or painful.  Hydrocortisone helps. She has an appt with [facility] in September. Recommend calling [facility].       Patient Active Problem List    Diagnosis Date Noted   • Chest pain 04/21/2016     Priority: High   • HTN (hypertension) 11/11/2013     Priority: High   • Dyslipidemia 09/30/2013     Priority: High   • CKD (chronic kidney disease) stage 3, GFR 30-59 ml/min 09/16/2015     Priority: Medium   • Chronic lower back pain 09/16/2015     Priority: Medium   • Chronic pain of right knee 09/16/2015     Priority: Medium   • GERD (gastroesophageal reflux disease) 09/30/2013     Priority: Medium   • Hypothyroid 09/30/2013     Priority: Medium   • Anxiety 09/30/2013     Priority: Low   • GENIA (obstructive sleep apnea) 04/10/2017   • Morbid obesity with BMI of 40.0-44.9, adult ([facility]) 03/14/2017   • Rash of face 03/14/2017   • Increased BMI 10/25/2016   • Cervical spine pain 06/02/2016   • Thoracic spine pain 06/02/2016   • Bleeding hemorrhoid 02/12/2016       Past Medical History   Diagnosis Date   • Enlarged tonsils      Removed with adenoids   • Hypertension       Medicated   • Psychiatric disorder      Anxiety - medicated   • High cholesterol    • Arrhythmia    • GERD (gastroesophageal reflux disease)    • Arthritis    • Anesthesia      PONV   • Skin cancer      skin   • Chest pain May 2016     PET scan with no infarct or ischemia, normal LVEF.   • DDD (degenerative disc disease), cervical    • Hemorrhoids      bleeding   • Back pain    • Obesity    • Chickenpox    • [place] measles        Past Surgical History   Procedure Laterality Date   • Other cardiac surgery       Cath - 4-5 years ago with ba   • Lumpectomy Right 2007     Breast lump removed   • Hysteroscopy with video diagnostic  10/7/08     [facility]   • Dilation and curettage  10/7/08     [facility]   • Carpal tunnel endoscopic  10/17/2011     Performed by EDGAR GARCIA [facility]   • Carpal tunnel endoscopic  10/28/2011     Per[facility] by ED[facility]AR G[facility]   • Other       hemorrhoid banding   • Tonsillectomy     • Carpal tunnel release         Family History   Problem Relation Age of Onset   • Cancer Mother    • Cancer Sister    • Cancer Maternal Aunt    • Cancer Other    • Cancer Paternal Grandfather      liver   • Heart Disease Neg Hx        Social History     Social History   • Marital Status: Single     Spouse Name: N/A   • Number of Children: N/A   • Years of Education: N/A     Occupational History   • Not on file.     Social History Main Topics   • Smoking status: Never Smoker    • Smokeless tobacco: Never Used   • Alcohol Use: No   • Drug Use: No   • Sexual Activity: Not on file     Other Topics Concern   • Not on file     Social History Narrative       Current Outpatient Prescriptions   Medication Sig Dispense Refill   • hydrochlorothiazide (HYDRODIURIL) 25 MG Tab Take 1 Tab by mouth every day. 90 Tab 3   • metoprolol SR (TOPROL  XL) 50 MG TABLET SR 24 HR Take 1 Tab by mouth every day. 90 Tab 3   • lisinopril (PRINIVIL, ZESTRIL) 40 MG tablet Take 1 Tab by mouth every day. 90 Tab 3   • rosuvastatin (CRESTOR) 10 MG Tab Take 1 Tab by mouth every day. 90 Tab 3   • levothyroxine (SYNTHROID) 75 MCG Tab Take 1 Tab by mouth every day. 90 Tab 3   • polyethylene glycol 3350 (MIRALAX) Powder Take 17 g by mouth every day.     • meclizine (ANTIVERT) 25 MG Tab Take 1 Tab by mouth 3 times a day as needed. 30 Tab 0   • acetaminophen-codeine #2 (TYLENOL #2) 300-15 MG Tab Take 1 Tab by mouth every 6 hours as needed for Severe Pain. 30 Tab 1   • triamcinolone acetonide (KENALOG) 0.025 % Cream Apply 1 Application to affected area(s) 2 times a day. 1 Tube 0   • albuterol 108 (90 BASE) MCG/ACT Aero Soln inhalation aerosol Inhale 2 Puffs by mouth every 6 hours as needed for Shortness of Breath. 8.5 g 0   • calcium acetate (PHOS-LO) 667 MG Cap Take 1,334 mg by mouth 3 times a day, with meals.     • Fiber Powder Take  by mouth.     • calcium carbonate (TUMS) 500 MG Chew Tab Take 500 mg by mouth every day.     • Calcium 500 MG Chew Tab Take 1,000 mg by mouth every day.     • Multiple Vitamins-Minerals (MULTI-VITAMIN GUMMIES PO) Take  by mouth.     • multivitamin (THERAGRAN) Tab Take 1 Tab by mouth every day.       No current facility-administered medications for this visit.          Allergies: Aleve; Latex; Naproxen sodium; Other misc; Skelaxin; and Sunscreens      ROS   Gen: Denies fever, chills, unintentional weight loss, fatigue  Resp:Denies Dyspnea  CV: Denies chest pain, chest tightness  Sleep:Denies morning headache, insomnia, daytime somnolence, snoring, gasping for air, apnea  Neuro: Denies frequent headaches, weakness, dizziness  See HPI.  All other systems reviewed and negative        Vital signs for this encounter:  Filed Vitals:    05/10/17 1443   Weight: 97.523 kg (215 lb)   Weight % change since last entry.: 0 %   BP: 124/76   Pulse: 72   Resp: 16    Temp: 36.9 °C (98.4 °F)   O2 sat % room air: 96 %                   Physical Exam:   Gen:         Alert and oriented, No apparent distress.   Neck:        No Lymphadenopathy.  Lungs:     Clear to auscultation bilaterally.    CV:          Regular rate and rhythm. No murmurs, rubs or gallops.   Abd:         Soft non tender, non distended.            Ext:          No clubbing, cyanosis, edema.    Assessment   1. GENIA (obstructive sleep apnea)  DME CPAP       PLAN:   Patient Instructions   1) Continue CPAP at [id number]  2) Clean mask and supplies weekly and change them as insurance allows  3) Return in about 6 months (around 11/10/2017) for Compliance, review of symptoms, if not sooner, follow up with JD Luke.

## 2017-07-19 ENCOUNTER — OFFICE VISIT (OUTPATIENT)
Dept: PULMONOLOGY | Facility: HOSPICE | Age: 67
End: 2017-07-19
Payer: MEDICARE

## 2017-07-19 VITALS
OXYGEN SATURATION: 95 % | WEIGHT: 220 LBS | RESPIRATION RATE: 16 BRPM | HEART RATE: 73 BPM | BODY MASS INDEX: 40.48 KG/M2 | HEIGHT: 62 IN | TEMPERATURE: 98.2 F | SYSTOLIC BLOOD PRESSURE: 122 MMHG | DIASTOLIC BLOOD PRESSURE: 60 MMHG

## 2017-07-19 DIAGNOSIS — R06.02 SHORTNESS OF BREATH: ICD-10-CM

## 2017-07-19 DIAGNOSIS — E66.01 MORBID OBESITY WITH BMI OF 40.0-44.9, ADULT (HCC): ICD-10-CM

## 2017-07-19 DIAGNOSIS — G47.33 OSA (OBSTRUCTIVE SLEEP APNEA): ICD-10-CM

## 2017-07-19 PROCEDURE — 99213 OFFICE O/P EST LOW 20 MIN: CPT | Performed by: NURSE PRACTITIONER

## 2017-07-19 RX ORDER — ALBUTEROL SULFATE 90 UG/1
2 AEROSOL, METERED RESPIRATORY (INHALATION) EVERY 6 HOURS PRN
Qty: 8.5 G | Refills: 3 | Status: ON HOLD | OUTPATIENT
Start: 2017-07-19 | End: 2019-08-14

## 2017-07-19 NOTE — PROGRESS NOTES
CC:  Here for f/u sleep issues as listed below    HPI:   Prince presents today for follow up obstructive  apnea. HSS from June 2016 showed an AHI of 12.2 with low oxygenation of 76%. Patient completed a titration study  that was successful titration to a CPAP pressure of 10 with a reduced AHI, correction of low oxygenation, and REM rebound. Currently she is being treated with CPAP @ [id number]. Compliance download from the dates 6/19/2017 - 7/18/2017 indicates she is wearing the device 100% for an avg of 9 hours and 9 minutes per night with a reduced AHI of 0.6.  She does tolerate pressure and mask well.  She wakes up refreshed and denies morning H/A. she sleeps better overall and not not naping like she used to. C/O of nasal dryness when she wakes. Her humidity is adequate. She will continue to clean supplies weekly and change them as insurance allows.     She is having red patches on her cheeks and forehead. They do not itch or painful.  Hydrocortisone helps. She has an appt with derm in September. 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 She is requesting a refill of her rescue inhaler which she originally received after she was diagnosed with bronchitis in 2016. She will try to use it before she plays her instrument.        Patient Active Problem List    Diagnosis Date Noted   • Chest pain 04/21/2016     Priority: High   • HTN (hypertension) 11/11/2013     Priority: High   • Dyslipidemia 09/30/2013     Priority: High   • CKD (chronic kidney disease) stage 3, GFR 30-59 ml/min 09/16/2015     Priority: Medium   • Chronic lower back pain 09/16/2015     Priority: Medium   • Chronic pain of right knee 09/16/2015     Priority: Medium   • GERD (gastroesophageal reflux disease) 09/30/2013     Priority: Medium   • Hypothyroid 09/30/2013     Priority: Medium   • Anxiety 09/30/2013     Priority: Low   • Shortness of breath 07/19/2017   • GENIA  (obstructive sleep apnea) 04/10/2017   • Morbid obesity with BMI of 40.0-44.9, adult (HCC) 03/14/2017   • Rash of face 03/14/2017   • Increased BMI 10/25/2016   • Cervical spine pain 06/02/2016   • Thoracic spine pain 06/02/2016   • Bleeding hemorrhoid 02/12/2016       Past Medical History   Diagnosis Date   • Enlarged tonsils      Removed with adenoids   • Hypertension      Medicated   • Psychiatric disorder      Anxiety - medicated   • High cholesterol    • Arrhythmia    • GERD (gastroesophageal reflux disease)    • Arthritis    • Anesthesia      PONV   • Skin cancer      skin   • Chest pain May 2016     PET scan with no infarct or ischemia, normal LVEF.   • DDD (degenerative disc disease), cervical    • Hemorrhoids      bleeding   • Back pain    • Obesity    • Chickenpox    • [place] measles        Past Surgical History   Procedure Laterality Date   • Other cardiac surgery       Cath - 4-5 years ago with ba   • Lumpectomy Right 2007     Breast lump removed   • Hysteroscopy with video diagnostic  10/7/08     [facility]   • Dilation and curettage  10/7/08     [facility]   • Carpal tunnel endoscopic  10/17/2011     [facility]AR [facility]ARC[facility]   • Carpal tunnel endoscopic  10/28/2011     [facility]   • Other       hemorrhoid banding   • Tonsillectomy     • Carpal tunnel release         Family History   Problem Relation Age of Onset   • Cancer Mother    • Cancer Sister    • Cancer Maternal Aunt    • Cancer Other    • Cancer Paternal Grandfather      liver   • Heart Disease Neg Hx        Social History     Social History   • Marital Status: Single     Spouse Name: N/A   • Number of Children: N/A   • Years of Education: N/A     Occupational History   • Not on file.     Social History Main Topics   • Smoking  status: Never Smoker    • Smokeless tobacco: Never Used   • Alcohol Use: No   • Drug Use: No   • Sexual Activity: Not on file     Other Topics Concern   • Not on file     Social History Narrative       Current Outpatient Prescriptions   Medication Sig Dispense Refill   • albuterol 108 (90 BASE) MCG/ACT Aero Soln inhalation aerosol Inhale 2 Puffs by mouth every 6 hours as needed for Shortness of Breath. 8.5 g 3   • hydrochlorothiazide (HYDRODIURIL) 25 MG Tab Take 1 Tab by mouth every day. 90 Tab 3   • metoprolol SR (TOPROL XL) 50 MG TABLET SR 24 HR Take 1 Tab by mouth every day. 90 Tab 3   • lisinopril (PRINIVIL, ZESTRIL) 40 MG tablet Take 1 Tab by mouth every day. 90 Tab 3   • rosuvastatin (CRESTOR) 10 MG Tab Take 1 Tab by mouth every day. 90 Tab 3   • levothyroxine (SYNTHROID) 75 MCG Tab Take 1 Tab by mouth every day. 90 Tab 3   • polyethylene glycol 3350 (MIRALAX) Powder Take 17 g by mouth every day.     • meclizine (ANTIVERT) 25 MG Tab Take 1 Tab by mouth 3 times a day as needed. 30 Tab 0   • acetaminophen-codeine #2 (TYLENOL #2) 300-15 MG Tab Take 1 Tab by mouth every 6 hours as needed for Severe Pain. 30 Tab 1   • triamcinolone acetonide (KENALOG) 0.025 % Cream Apply 1 Application to affected area(s) 2 times a day. 1 Tube 0   • calcium acetate (PHOS-LO) 667 MG Cap Take 1,334 mg by mouth 3 times a day, with meals.     • Fiber Powder Take  by mouth.     • calcium carbonate (TUMS) 500 MG Chew Tab Take 500 mg by mouth every day.     • Calcium 500 MG Chew Tab Take 1,000 mg by mouth every day.     • Multiple Vitamins-Minerals (MULTI-VITAMIN GUMMIES PO) Take  by mouth.     • multivitamin (THERAGRAN) Tab Take 1 Tab by mouth every day.       No current facility-administered medications for this visit.          Allergies: Aleve; Latex; Naproxen sodium; Other misc; Skelaxin; and Sunscreens      ROS   Gen: Denies fever, chills, unintentional weight loss, fatigue  Resp:Denies   CV: Denies chest pain, chest  "tightness  Sleep:Denies morning headache, insomnia, daytime somnolence, snoring, gasping for air, apnea  Neuro: Denies frequent headaches, weakness, dizziness  See HPI.  All other systems reviewed and negative        Vital signs for this encounter:  Filed Vitals:    07/19/17 1321   Height: 1.575 m (5' 2\")   Weight: 99.791 kg (220 lb)   Weight % change since last entry.: 0 %   BP: 122/60   Pulse: 73   BMI (Calculated): 40.24   Resp: 16   Temp: 36.8 °C (98.2 °F)   O2 sat % room air: 95 %                   Physical Exam:   Gen:         Alert and oriented, No apparent distress.   Neck:        No Lymphadenopathy.  Lungs:     Clear to auscultation bilaterally.    CV:          Regular rate and rhythm. No murmurs, rubs or gallops.   Abd:         Soft non tender, non distended.            Ext:          No clubbing, cyanosis, edema.    Assessment   1. Shortness of breath  albuterol 108 (90 BASE) MCG/ACT Aero Soln inhalation aerosol    DME MASK AND SUPPLIES   2. GENIA (obstructive sleep apnea)     3. Morbid obesity with BMI of 40.0-44.9, adult (HCC)         PLAN:   Patient Instructions   1) Continue CPAP at [id number]  2) Clean mask and supplies weekly and change them as insurance allows   3) Rescue inhaler for dyspnea  4) Return in about 3 months (around 10/19/2017) for Compliance, review of symptoms, if not sooner, follow up with JD Luke.          "

## 2017-07-19 NOTE — PATIENT INSTRUCTIONS
1) Continue CPAP at [id number]  2) Clean mask and supplies weekly and change them as insurance allows   3) Rescue inhaler for dyspnea  4) Return in about 6 months (around 1/19/2018) for Compliance, review of symptoms, if not sooner, follow up with JD Luke.

## 2017-07-19 NOTE — MR AVS SNAPSHOT
"        Prince Mazariegos   2017 1:20 PM   Office Visit   MRN: [id number]    Department:  [facility]   Dept Phone:  [phone]    Description:  Female : 1950   Provider:  CHLOE Meredith           Reason for Visit     Apnea Last seen 5/10/17       Allergies as of 2017     Allergen Noted Reactions    Aleve [Gnp Naproxen Sodium] 10/12/2011       Latex 10/12/2011   Rash    Naproxen Sodium 06/15/2008   Hives    Other Misc 10/12/2011   Swelling    Some soap & toothpaste    Skelaxin [Metaxalone] 06/15/2008   Hives    Sunscreens 2015         You were diagnosed with     Shortness of breath   [786.05.ICD-9-CM]       GENIA (obstructive sleep apnea)   [607264]       Morbid obesity with BMI of 40.0-44.9, adult ([facility])   [473934]         Vital Signs     Blood Pressure Pulse Temperature Respirations Height Weight    122/60 mmHg 73 36.8 °C (98.2 °F) 16 1.575 m (5' 2\") 99.791 kg (220 lb)    Body Mass Index Oxygen Saturation Smoking Status             40.23 kg/m2 95% Never Smoker          Basic Information     Date Of Birth Sex Race Ethnicity Preferred Language    1950 Female White Non- English      Your appointments     2018  8:00 AM   Follow UP with CHLOE Meredith   [facility] (--)    [address] [place] [address]  [place] [id number] [phone]              Problem List              ICD-10-CM Priority Class Noted - Resolved    Anxiety F41.9 Low  2013 - Present    GERD (gastroesophageal reflux disease) K21.9 Medium  2013 - Present    Hypothyroid E03.9 Medium  2013 - Present    Dyslipidemia E78.5 High  2013 - Present    HTN (hypertension) I10 High  2013 - Present    CKD (chronic kidney disease) stage 3, GFR 30-59 ml/min N18.3 Medium  2015 - Present    Chronic lower back pain M54.5, G89.29 Medium  2015 - Present    Chronic pain of right knee M25.561, G89.29 Medium  2015 - Present   " Bleeding hemorrhoid K64.9   2/12/2016 - Present    Chest pain R07.9 High  4/21/2016 - Present    Cervical spine pain M54.2   6/2/2016 - Present    Thoracic spine pain M54.6   6/2/2016 - Present    Increased BMI R63.8   10/25/2016 - Present    Morbid obesity with BMI of 40.0-44.9, adult (HCC) E66.01, Z68.41   3/14/2017 - Present    Rash of face R21   3/14/2017 - Present    GENIA (obstructive sleep apnea) G47.33   4/10/2017 - Present    Shortness of breath R06.02   7/19/2017 - Present      Health Maintenance        Date Due Completion Dates    PAP SMEAR 10/1/2015 10/1/2012 (N/S)    Override on 10/1/2012: (N/S)    IMM PNEUMOCOCCAL 65+ (ADULT) LOW/MEDIUM RISK SERIES (1 of 2 - PCV13) 5/29/2017 ---    IMM INFLUENZA (1) 9/1/2017 11/17/2014, 11/11/2013    MAMMOGRAM 10/7/2017 10/7/2016, 9/2/2015, 8/25/2014, 8/21/2013, 8/20/2012, 8/17/2011, 8/16/2010, 8/16/2010, 8/13/2009, 8/13/2009, 7/23/2008, 7/23/2008, 7/19/2007, 7/19/2007, 3/6/2007, 8/22/2006, 8/15/2006, 8/12/2005, 8/11/2004    COLONOSCOPY 4/1/2019 4/1/2009 (N/S)    Override on 4/1/2009: (N/S)    BONE DENSITY 12/4/2020 12/4/2015, 10/13/2008    IMM DTaP/Tdap/Td Vaccine (2 - Td) 3/14/2027 3/14/2017, 4/13/2000            Current Immunizations     Influenza TIV (IM) 11/11/2013  3:05 PM    Influenza Vaccine Quad Inj (Preserved) 11/17/2014  3:00 PM    SHINGLES VACCINE 11/11/2013  3:04 PM    TD Vaccine 4/13/2000    Tdap Vaccine 3/14/2017  8:13 AM      Below and/or attached are the medications your provider expects you to take. Review all of your home medications and newly ordered medications with your provider and/or pharmacist. Follow medication instructions as directed by your provider and/or pharmacist. Please keep your medication list with you and share with your provider. Update the information when medications are discontinued, doses are changed, or new medications (including over-the-counter products) are added; and carry medication information at all times in the event of  emergency situations     Allergies:  ALEVE - (reactions not documented)     LATEX - Rash     NAPROXEN SODIUM - Hives     OTHER MISC - Swelling     SKELAXIN - Hives     SUNSCREENS - (reactions not documented)               Medications  Valid as of: July 19, 2017 -  1:58 PM    Generic Name Brand Name Tablet Size Instructions for use    Acetaminophen-Codeine (Tab) TYLENOL #2 300-15 MG Take 1 Tab by mouth every 6 hours as needed for Severe Pain.        Albuterol Sulfate (Aero Soln) albuterol 108 (90 BASE) MCG/ACT Inhale 2 Puffs by mouth every 6 hours as needed for Shortness of Breath.        Calcium Acetate (Phos Binder) (Cap) PHOS- MG Take 1,334 mg by mouth 3 times a day, with meals.        Calcium Carbonate (Chew Tab) Calcium 500 MG Take 1,000 mg by mouth every day.        Calcium Carbonate Antacid (Chew Tab) TUMS 500 MG Take 500 mg by mouth every day.        Fiber (Powder) Fiber  Take  by mouth.        HydroCHLOROthiazide (Tab) HYDRODIURIL 25 MG Take 1 Tab by mouth every day.        Levothyroxine Sodium (Tab) SYNTHROID 75 MCG Take 1 Tab by mouth every day.        Lisinopril (Tab) PRINIVIL, ZESTRIL 40 MG Take 1 Tab by mouth every day.        Meclizine HCl (Tab) ANTIVERT 25 MG Take 1 Tab by mouth 3 times a day as needed.        Metoprolol Succinate (TABLET SR 24 HR) TOPROL XL 50 MG Take 1 Tab by mouth every day.        Multiple Vitamin (Tab) THERAGRAN  Take 1 Tab by mouth every day.        Multiple Vitamins-Minerals   Take  by mouth.        Polyethylene Glycol 3350 (Powder) MIRALAX  Take 17 g by mouth every day.        Rosuvastatin Calcium (Tab) CRESTOR 10 MG Take 1 Tab by mouth every day.        Triamcinolone Acetonide (Cream) KENALOG 0.025 % Apply 1 Application to affected area(s) 2 times a day.        .                 Medicines prescribed today were sent to:     [facility] [place] - [address]    [address] [place] [address]    Phone: [phone] Fax: [phone]    Open  24 Hours?: No    [facility] [place]    [address] [place] [address]    Phone: [phone] Fax: [phone]    [facility] - [place], [place] - [address]    [address] [address]    Phone: [phone] Fax: [phone]    Open 24 Hours?: No      Medication refill instructions:       If your prescription bottle indicates you have medication refills left, it is not necessary to call your provider’s office. Please contact your pharmacy and they will refill your medication.    If your prescription bottle indicates you do not have any refills left, you may request refills at any time through one of the following ways: The online [employer] system (except Urgent Care), by calling your provider’s office, or by asking your pharmacy to contact your provider’s office with a refill request. Medication refills are processed only during regular business hours and may not be available until the next business day. Your provider may request additional information or to have a follow-up visit with you prior to refilling your medication.   *Please Note: Medication refills are assigned a new Rx number when refilled electronically. Your pharmacy may indicate that no refills were authorized even though a new prescription for the same medication is available at the pharmacy. Please request the medicine by name with the pharmacy before contacting your provider for a refill.        Instructions    1) Continue CPAP at [id number]  2) Clean mask and supplies weekly and change them as insurance allows   3) Rescue inhaler for dyspnea  4) Return in about 6 months (around 1/19/2018) for Compliance, review of symptoms, if not sooner, follow up with JD Luke.              [employer] Access Code: Activation code not generated  Current [employer] Status: Active

## 2017-09-28 ENCOUNTER — APPOINTMENT (RX ONLY)
Dept: URBAN - METROPOLITAN AREA CLINIC 20 | Facility: CLINIC | Age: 67
Setting detail: DERMATOLOGY
End: 2017-09-28

## 2017-09-28 DIAGNOSIS — L82.1 OTHER SEBORRHEIC KERATOSIS: ICD-10-CM

## 2017-09-28 DIAGNOSIS — L85.3 XEROSIS CUTIS: ICD-10-CM

## 2017-09-28 DIAGNOSIS — L71.8 OTHER ROSACEA: ICD-10-CM

## 2017-09-28 PROCEDURE — ? PRESCRIPTION

## 2017-09-28 PROCEDURE — ? COUNSELING

## 2017-09-28 PROCEDURE — 99213 OFFICE O/P EST LOW 20 MIN: CPT

## 2017-09-28 RX ORDER — AZELAIC ACID 0.15 G/G
GEL TOPICAL QD
Qty: 1 | Refills: 3 | Status: ERX | COMMUNITY
Start: 2017-09-28

## 2017-09-28 RX ADMIN — AZELAIC ACID: 0.15 GEL TOPICAL at 00:00

## 2017-09-28 ASSESSMENT — LOCATION SIMPLE DESCRIPTION DERM
LOCATION SIMPLE: RIGHT UPPER ARM
LOCATION SIMPLE: RIGHT CHEEK
LOCATION SIMPLE: LEFT FOREARM
LOCATION SIMPLE: LEFT CHEEK

## 2017-09-28 ASSESSMENT — LOCATION DETAILED DESCRIPTION DERM
LOCATION DETAILED: RIGHT ANTERIOR DISTAL UPPER ARM
LOCATION DETAILED: LEFT INFERIOR CENTRAL MALAR CHEEK
LOCATION DETAILED: RIGHT INFERIOR CENTRAL MALAR CHEEK
LOCATION DETAILED: RIGHT MEDIAL MALAR CHEEK
LOCATION DETAILED: LEFT PROXIMAL DORSAL FOREARM

## 2017-09-28 ASSESSMENT — LOCATION ZONE DERM
LOCATION ZONE: ARM
LOCATION ZONE: FACE

## 2017-09-28 NOTE — HPI: RASH
How Severe Is Your Rash?: mild
Is This A New Presentation, Or A Follow-Up?: Rash
Additional History: Patient thinks that a cream called Cererenato

## 2017-10-02 ENCOUNTER — RX ONLY (OUTPATIENT)
Age: 67
Setting detail: RX ONLY
End: 2017-10-02

## 2017-10-02 RX ORDER — METRONIDAZOLE 10 MG/G
GEL TOPICAL
Qty: 1 | Refills: 0 | Status: ERX | COMMUNITY
Start: 2017-10-02

## 2017-10-10 ENCOUNTER — NON-PROVIDER VISIT (OUTPATIENT)
Dept: MEDICAL GROUP | Facility: MEDICAL CENTER | Age: 67
End: 2017-10-10
Payer: MEDICARE

## 2017-10-10 ENCOUNTER — NON-PROVIDER VISIT (OUTPATIENT)
Dept: MEDICAL GROUP | Age: 67
End: 2017-10-10
Payer: MEDICARE

## 2017-10-10 ENCOUNTER — HOSPITAL ENCOUNTER (OUTPATIENT)
Dept: RADIOLOGY | Facility: MEDICAL CENTER | Age: 67
End: 2017-10-10
Attending: SPECIALIST
Payer: MEDICARE

## 2017-10-10 DIAGNOSIS — Z23 NEED FOR IMMUNIZATION AGAINST INFLUENZA: ICD-10-CM

## 2017-10-10 DIAGNOSIS — Z12.31 ENCOUNTER FOR SCREENING MAMMOGRAM FOR MALIGNANT NEOPLASM OF BREAST: ICD-10-CM

## 2017-10-10 DIAGNOSIS — Z23 NEED FOR VACCINATION AGAINST STREPTOCOCCUS PNEUMONIAE: ICD-10-CM

## 2017-10-10 PROCEDURE — 90662 IIV NO PRSV INCREASED AG IM: CPT | Performed by: FAMILY MEDICINE

## 2017-10-10 PROCEDURE — G0009 ADMIN PNEUMOCOCCAL VACCINE: HCPCS | Performed by: FAMILY MEDICINE

## 2017-10-10 PROCEDURE — G0008 ADMIN INFLUENZA VIRUS VAC: HCPCS | Performed by: FAMILY MEDICINE

## 2017-10-10 PROCEDURE — 90732 PPSV23 VACC 2 YRS+ SUBQ/IM: CPT | Performed by: FAMILY MEDICINE

## 2017-10-10 PROCEDURE — G0202 SCR MAMMO BI INCL CAD: HCPCS

## 2017-10-10 NOTE — PROGRESS NOTES
"Prince Mazariegos is a 67 y.o. female here for a non-provider visit for:   FLU    Reason for immunization: Annual Flu Vaccine  Immunization records indicate need for vaccine: Yes, confirmed with Epic  Minimum interval has been met for this vaccine: Yes  ABN completed: Yes    Order and dose verified by: SS  VIS Dated   was given to patient: Yes  All IAC Questionnaire questions were answered \"No.\"    Patient tolerated injection and no adverse effects were observed or reported: Yes    Pt scheduled for next dose in series: No    "

## 2018-01-16 ENCOUNTER — APPOINTMENT (RX ONLY)
Dept: URBAN - METROPOLITAN AREA CLINIC 20 | Facility: CLINIC | Age: 68
Setting detail: DERMATOLOGY
End: 2018-01-16

## 2018-01-16 DIAGNOSIS — L70.0 ACNE VULGARIS: ICD-10-CM

## 2018-01-16 PROBLEM — E03.9 HYPOTHYROIDISM, UNSPECIFIED: Status: ACTIVE | Noted: 2018-01-16

## 2018-01-16 PROCEDURE — ? PRESCRIPTION

## 2018-01-16 PROCEDURE — 99212 OFFICE O/P EST SF 10 MIN: CPT

## 2018-01-16 PROCEDURE — ? COUNSELING

## 2018-01-16 PROCEDURE — ? ORDER TESTS

## 2018-01-16 RX ORDER — DOXYCYCLINE HYCLATE 100 MG/1
CAPSULE, GELATIN COATED ORAL BID
Qty: 14 | Refills: 0 | Status: ERX | COMMUNITY
Start: 2018-01-16

## 2018-01-16 RX ADMIN — DOXYCYCLINE HYCLATE: 100 CAPSULE, GELATIN COATED ORAL at 00:00

## 2018-01-16 ASSESSMENT — LOCATION DETAILED DESCRIPTION DERM
LOCATION DETAILED: NASAL DORSUM
LOCATION DETAILED: NASAL ROOT

## 2018-01-16 ASSESSMENT — LOCATION SIMPLE DESCRIPTION DERM: LOCATION SIMPLE: NOSE

## 2018-01-16 ASSESSMENT — LOCATION ZONE DERM: LOCATION ZONE: NOSE

## 2018-01-17 ENCOUNTER — SLEEP CENTER VISIT (OUTPATIENT)
Dept: SLEEP MEDICINE | Facility: MEDICAL CENTER | Age: 68
End: 2018-01-17
Payer: MEDICARE

## 2018-01-17 VITALS
SYSTOLIC BLOOD PRESSURE: 114 MMHG | WEIGHT: 225 LBS | OXYGEN SATURATION: 96 % | BODY MASS INDEX: 41.41 KG/M2 | RESPIRATION RATE: 16 BRPM | DIASTOLIC BLOOD PRESSURE: 72 MMHG | HEIGHT: 62 IN | HEART RATE: 62 BPM

## 2018-01-17 DIAGNOSIS — E66.01 MORBID OBESITY WITH BMI OF 40.0-44.9, ADULT (HCC): ICD-10-CM

## 2018-01-17 DIAGNOSIS — G47.33 OSA (OBSTRUCTIVE SLEEP APNEA): ICD-10-CM

## 2018-01-17 PROCEDURE — 99213 OFFICE O/P EST LOW 20 MIN: CPT | Performed by: NURSE PRACTITIONER

## 2018-01-17 NOTE — PROGRESS NOTES
CC:  Here for f/u sleep and pulmonary issues as listed below    HPI:   Prince presents today for follow up obstructive  apnea. HSS from June 2016 showed an AHI of 12.2 with low oxygenation of 76%.  Currently she is being treated with CPAP @ [id number]. Compliance download from the dates 12/18/2017 - 1/16/2018 indicates she is wearing the device 100% for an avg of 9 hours and 24 minutes per night with a reduced AHI of 0.4.  She does tolerate pressure and mask well.  She wakes up refreshed and denies morning H/A. she sleeps better overall and not not naping like she used to. C/o of nasal dryness has resolved. She will continue to clean supplies weekly and change them as insurance allows.        She recently went to dermatology recently and dx with Rosacea.  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 She has not used it, but happy to have it on hand.       Patient Active Problem List    Diagnosis Date Noted   • Chest pain 04/21/2016     Priority: High   • HTN (hypertension) 11/11/2013     Priority: High   • Dyslipidemia 09/30/2013     Priority: High   • CKD (chronic kidney disease) stage 3, GFR 30-59 ml/min 09/16/2015     Priority: Medium   • Chronic lower back pain 09/16/2015     Priority: Medium   • Chronic pain of right knee 09/16/2015     Priority: Medium   • GERD (gastroesophageal reflux disease) 09/30/2013     Priority: Medium   • Hypothyroid 09/30/2013     Priority: Medium   • Anxiety 09/30/2013     Priority: Low   • Shortness of breath 07/19/2017   • GENIA (obstructive sleep apnea) 04/10/2017   • Morbid obesity with BMI of 40.0-44.9, adult (HCC) 03/14/2017   • Rash of face 03/14/2017   • Increased BMI 10/25/2016   • Cervical spine pain 06/02/2016   • Thoracic spine pain 06/02/2016   • Bleeding hemorrhoid 02/12/2016       Past Medical History:   Diagnosis Date   • Anesthesia     PONV   • Arrhythmia    • Arthritis    • Back pain     • Chest pain May 2016    PET scan with no infarct or ischemia, normal LVEF.   • Chickenpox    • DDD (degenerative disc disease), cervical    • Enlarged tonsils     Removed with adenoids   • GERD (gastroesophageal reflux disease)    • Indonesian measles    • Hemorrhoids     bleeding   • High cholesterol    • Hypertension     Medicated   • Obesity    • Psychiatric disorder     Anxiety - medicated   • Skin cancer     skin       Past Surgical History:   Procedure Laterality Date   • CARPAL TUNNEL ENDOSCOPIC  10/28/2011    Performed by EDGAR GARCIA at [facility]   • CARPAL TUNNEL ENDOSCOPIC  10/17/2011    Performed by EDGAR GARCIA at [facility]   • HYSTEROSCOPY WITH VIDEO DIAGNOSTIC  10/7/08    [facility]   • DILATION AND CURETTAGE  10/7/08    [facility]   • LUMPECTOMY Right 2007    Breast lump removed   • CARPAL TUNNEL RELEASE     • OTHER      hemorrhoid banding   • OTHER CARDIAC SURGERY      Cath - 4-5 years ago with ba   • TONSILLECTOMY         Family History   Problem Relation Age of Onset   • Cancer Mother    • Cancer Sister    • Cancer Maternal Aunt    • Cancer Other    • Cancer Paternal Grandfather      liver   • Heart Disease Neg Hx        Social History     Social History   • Marital status: Single     Spouse name: N/A   • Number of children: N/A   • Years of education: N/A     Occupational History   • Not on file.     Social History Main Topics   • Smoking status: Never Smoker   • Smokeless tobacco: Never Used   • Alcohol use No   • Drug use: No   • Sexual activity: Not on file     Other Topics Concern   • Not on file     Social History Narrative   • No narrative on file       Current Outpatient Prescriptions   Medication Sig Dispense Refill   • hydrochlorothiazide (HYDRODIURIL) 25 MG Tab Take 1 Tab by mouth every day. 90 Tab 3   • metoprolol  SR (TOPROL XL) 50 MG TABLET SR 24 HR Take 1 Tab by mouth every day. 90 Tab 3   • lisinopril (PRINIVIL, ZESTRIL) 40 MG tablet Take 1 Tab by mouth every day. 90 Tab 3   • rosuvastatin (CRESTOR) 10 MG Tab Take 1 Tab by mouth every day. 90 Tab 3   • levothyroxine (SYNTHROID) 75 MCG Tab Take 1 Tab by mouth every day. 90 Tab 3   • polyethylene glycol 3350 (MIRALAX) Powder Take 17 g by mouth every day.     • calcium acetate (PHOS-LO) 667 MG Cap Take 1,334 mg by mouth 3 times a day, with meals.     • multivitamin (THERAGRAN) Tab Take 1 Tab by mouth every day.     • Fiber Powder Take  by mouth.     • calcium carbonate (TUMS) 500 MG Chew Tab Take 500 mg by mouth every day.     • Calcium 500 MG Chew Tab Take 1,000 mg by mouth every day.     • Multiple Vitamins-Minerals (MULTI-VITAMIN GUMMIES PO) Take  by mouth.     • albuterol 108 (90 BASE) MCG/ACT Aero Soln inhalation aerosol Inhale 2 Puffs by mouth every 6 hours as needed for Shortness of Breath. 8.5 g 3   • meclizine (ANTIVERT) 25 MG Tab Take 1 Tab by mouth 3 times a day as needed. 30 Tab 0   • acetaminophen-codeine #2 (TYLENOL #2) 300-15 MG Tab Take 1 Tab by mouth every 6 hours as needed for Severe Pain. 30 Tab 1   • triamcinolone acetonide (KENALOG) 0.025 % Cream Apply 1 Application to affected area(s) 2 times a day. 1 Tube 0     No current facility-administered medications for this visit.           Allergies: Aleve [gnp naproxen sodium]; Latex; Naproxen sodium; Other misc; Skelaxin [metaxalone]; and Sunscreens      ROS   Gen: Denies fever, chills, unintentional weight loss, fatigue, night sweats  E/N/T: Denies ear pain, nasal congestion  Resp:Denies Dyspnea, wheezing, cough, sputum production, hemoptysis  CV: Denies chest pain, chest tightness, palpitations, BLE edema  Sleep:Denies morning headache, insomnia, daytime somnolence, snoring, gasping for air, apnea  Neuro: Denies frequent headaches, weakness, dizziness  GI: Denies N/V, acid reflux/heartburn  See HPI.  All  "other systems reviewed and negative      Vital signs for this encounter:  Vitals:    01/17/18 0806   Height: 1.575 m (5' 2\")   Weight: 102.1 kg (225 lb)   Weight % change since last entry.: 0 %   BP: 114/72   Pulse: 62   BMI (Calculated): 41.15   Resp: 16   O2 sat % room air: 96 %                 Physical Exam:   Appearance: well developed, well nourished, no acute distress.  Eyes: PERRL, EOM intact, sclere white, conjunctiva moist.  Ears: no lesions or deformities.  Hearing: grossly intact.  Nose: no lesions or deformities.  Dentition: good dentition.  Oropharynx: tongue normal, posterior pharynx without erythema or exudate.  Neck: supple, trachea midline, no masses.  Respiratory effort: no intercostal retractions or use of accessory muscles.  Lung auscultation: Bilateral diminished   Heart auscultation: no murmur, rub, or gallop.   Extremities: no cyanosis or edema.  Abdomen: soft, non-tender, no masses.  Gait and station: grossly normal   Digits and Nails: no clubbing, cyanosis, petechiae, or nodes.  Cranial nerves: grossly normal.  Motor: no focal deficits observed.  Skin: no rashes, lesions, or ulcers noted.  Orientation: oriented to time, place, and person.  Mood and affect: mood and affect appropriate, normal interaction with examiner.    Assessment   1. GENIA (obstructive sleep apnea)  DME MASK AND SUPPLIES   2. Morbid obesity with BMI of 40.0-44.9, adult (HCC)         Patient was seen for  minutes, more than 50% of time spent in face to face review, counseling, and arranging future evaluation and follow up of medical conditions and care.     PLAN:   Patient Instructions   1) Continue CPAP at [id number]  2) Clean mask and supplies weekly and change them as insurance allows   3) Rescue inhaler for dyspnea  4) Vaccines: Up to date with Pneumovax 23 and flu  5) Return in about 6 months (around 7/17/2018) for Compliance, review of symptoms, if not sooner, follow up with JD Luke.          "

## 2018-01-17 NOTE — PATIENT INSTRUCTIONS
1) Continue CPAP at [id number]  2) Clean mask and supplies weekly and change them as insurance allows   3) Rescue inhaler for dyspnea  4) Vaccines: Up to date with Pneumovax 23 and flu  5) Return in about 6 months (around 7/17/2018) for Compliance, review of symptoms, if not sooner, follow up with JD Luke.

## 2018-04-10 ENCOUNTER — APPOINTMENT (RX ONLY)
Dept: URBAN - METROPOLITAN AREA CLINIC 20 | Facility: CLINIC | Age: 68
Setting detail: DERMATOLOGY
End: 2018-04-10

## 2018-04-10 DIAGNOSIS — D18.0 HEMANGIOMA: ICD-10-CM

## 2018-04-10 DIAGNOSIS — L82.1 OTHER SEBORRHEIC KERATOSIS: ICD-10-CM

## 2018-04-10 DIAGNOSIS — L81.4 OTHER MELANIN HYPERPIGMENTATION: ICD-10-CM

## 2018-04-10 DIAGNOSIS — L57.8 OTHER SKIN CHANGES DUE TO CHRONIC EXPOSURE TO NONIONIZING RADIATION: ICD-10-CM

## 2018-04-10 DIAGNOSIS — L71.8 OTHER ROSACEA: ICD-10-CM

## 2018-04-10 DIAGNOSIS — D22 MELANOCYTIC NEVI: ICD-10-CM

## 2018-04-10 PROBLEM — D18.01 HEMANGIOMA OF SKIN AND SUBCUTANEOUS TISSUE: Status: ACTIVE | Noted: 2018-04-10

## 2018-04-10 PROBLEM — D22.5 MELANOCYTIC NEVI OF TRUNK: Status: ACTIVE | Noted: 2018-04-10

## 2018-04-10 PROCEDURE — ? PRESCRIPTION

## 2018-04-10 PROCEDURE — 99214 OFFICE O/P EST MOD 30 MIN: CPT

## 2018-04-10 PROCEDURE — ? ADDITIONAL NOTES

## 2018-04-10 PROCEDURE — ? COUNSELING

## 2018-04-10 RX ORDER — DOXYCYCLINE 40 MG/1
CAPSULE ORAL QD
Qty: 30 | Refills: 4 | Status: ERX | COMMUNITY
Start: 2018-04-10

## 2018-04-10 RX ADMIN — DOXYCYCLINE: 40 CAPSULE ORAL at 00:00

## 2018-04-10 ASSESSMENT — LOCATION SIMPLE DESCRIPTION DERM
LOCATION SIMPLE: RIGHT UPPER BACK
LOCATION SIMPLE: NOSE
LOCATION SIMPLE: LEFT UPPER BACK
LOCATION SIMPLE: RIGHT CHEEK
LOCATION SIMPLE: LEFT HAND
LOCATION SIMPLE: RIGHT FOREHEAD
LOCATION SIMPLE: RIGHT ANTERIOR NECK
LOCATION SIMPLE: RIGHT HAND
LOCATION SIMPLE: LEFT CHEEK

## 2018-04-10 ASSESSMENT — LOCATION ZONE DERM
LOCATION ZONE: NECK
LOCATION ZONE: TRUNK
LOCATION ZONE: FACE
LOCATION ZONE: NOSE
LOCATION ZONE: HAND

## 2018-04-10 ASSESSMENT — LOCATION DETAILED DESCRIPTION DERM
LOCATION DETAILED: RIGHT RADIAL DORSAL HAND
LOCATION DETAILED: RIGHT INFERIOR MEDIAL UPPER BACK
LOCATION DETAILED: RIGHT INFERIOR CENTRAL MALAR CHEEK
LOCATION DETAILED: LEFT SUPERIOR UPPER BACK
LOCATION DETAILED: NASAL DORSUM
LOCATION DETAILED: RIGHT SUPERIOR UPPER BACK
LOCATION DETAILED: RIGHT MEDIAL FOREHEAD
LOCATION DETAILED: RIGHT INFERIOR ANTERIOR NECK
LOCATION DETAILED: LEFT INFERIOR CENTRAL MALAR CHEEK
LOCATION DETAILED: LEFT RADIAL DORSAL HAND

## 2018-04-16 ENCOUNTER — RX ONLY (OUTPATIENT)
Age: 68
Setting detail: RX ONLY
End: 2018-04-16

## 2018-04-16 RX ORDER — DOXYCYCLINE HYCLATE 50 MG/1
CAPSULE, GELATIN COATED ORAL
Qty: 30 | Refills: 4 | Status: CANCELLED

## 2018-04-16 RX ORDER — METRONIDAZOLE 7.5 MG/G
GEL TOPICAL
Qty: 1 | Refills: 0 | Status: CANCELLED

## 2018-04-18 RX ORDER — LISINOPRIL 40 MG/1
TABLET ORAL
Qty: 90 TAB | Refills: 0 | Status: SHIPPED | OUTPATIENT
Start: 2018-04-18 | End: 2018-05-18 | Stop reason: SDUPTHER

## 2018-04-18 RX ORDER — LEVOTHYROXINE SODIUM 0.07 MG/1
TABLET ORAL
Qty: 90 TAB | Refills: 0 | Status: SHIPPED | OUTPATIENT
Start: 2018-04-18 | End: 2018-05-18 | Stop reason: SDUPTHER

## 2018-04-18 RX ORDER — METOPROLOL SUCCINATE 50 MG/1
TABLET, EXTENDED RELEASE ORAL
Qty: 90 TAB | Refills: 0 | Status: SHIPPED | OUTPATIENT
Start: 2018-04-18 | End: 2018-05-18 | Stop reason: SDUPTHER

## 2018-04-18 RX ORDER — ROSUVASTATIN CALCIUM 10 MG/1
TABLET, COATED ORAL
Qty: 90 TAB | Refills: 0 | Status: SHIPPED | OUTPATIENT
Start: 2018-04-18 | End: 2018-05-18 | Stop reason: SDUPTHER

## 2018-04-18 RX ORDER — HYDROCHLOROTHIAZIDE 25 MG/1
TABLET ORAL
Qty: 90 TAB | Refills: 0 | Status: SHIPPED | OUTPATIENT
Start: 2018-04-18 | End: 2018-05-18 | Stop reason: SDUPTHER

## 2018-04-18 NOTE — LETTER
April 19, 2018        Prince Mazariegos  [address] [place] [address]        Dear Prince:    This letter is to inform you the you are due for an annual appointment. Please contact our scheduling department at [phone] To schedule       If you have any questions or concerns, please don't hesitate to call.        Sincerely,        Joao Ireland M.D.    Electronically Signed

## 2018-04-19 NOTE — TELEPHONE ENCOUNTER
Refill done. Patient is due for annual appointment. Please have patient schedule.  Joao Ireland M.D.

## 2018-04-24 ENCOUNTER — TELEPHONE (OUTPATIENT)
Dept: MEDICAL GROUP | Facility: MEDICAL CENTER | Age: 68
End: 2018-04-24

## 2018-04-24 ENCOUNTER — PATIENT OUTREACH (OUTPATIENT)
Dept: HEALTH INFORMATION MANAGEMENT | Facility: OTHER | Age: 68
End: 2018-04-24

## 2018-04-24 DIAGNOSIS — I10 ESSENTIAL HYPERTENSION: ICD-10-CM

## 2018-04-24 DIAGNOSIS — E03.9 HYPOTHYROIDISM, UNSPECIFIED TYPE: ICD-10-CM

## 2018-04-24 NOTE — PROGRESS NOTES
1. Attempt #:Final    2. HealthConnect Verified: yes    3. Verify PCP: yes    4. Care Team Updated:       •   DME Company (gait device, O2, CPAP, etc.): NO       •   Other Specialists (eye doctor, derm, GYN, cardiology, endo, etc): YES    5.  Reviewed/Updated the following with patient:       •   Communication Preference Obtained? YES       •   Preferred Pharmacy? YES       •   Preferred Lab? YES       •   Family History (document living status of immediate family members and if + hx of cancer, diabetes, hypertension, hyperlipidemia, heart attack, stroke) YES. Was Abstract Encounter opened and chart updated? YES    Pt requested orders for labs / sent request to PCP    6. Espressi Activation: already active    7. Espressi Yash: yes    8. Annual Wellness Visit Scheduling  Scheduling Status:Scheduled      9. Care Gap Scheduling (Attempt to Schedule EACH Overdue Care Gap!)     Health Maintenance Due   Topic Date Due   • Annual Wellness Visit  1950   • PAP SMEAR  10/01/2015// Pt stated that will schedule with her gynecolog.        Scheduled patient for Annual Wellness Visit    10. Patient was advised: “This is a free wellness visit. The provider will screen for medical conditions to help you stay healthy. If you have other concerns to address you may be asked to discuss these at a separate visit or there may be an additional fee.”     11. Patient was informed to arrive 15 min prior to their scheduled appointment and bring in their medication bottles.

## 2018-05-15 ENCOUNTER — HOSPITAL ENCOUNTER (OUTPATIENT)
Dept: LAB | Facility: MEDICAL CENTER | Age: 68
End: 2018-05-15
Attending: FAMILY MEDICINE
Payer: MEDICARE

## 2018-05-15 DIAGNOSIS — I10 ESSENTIAL HYPERTENSION: ICD-10-CM

## 2018-05-15 DIAGNOSIS — E03.9 HYPOTHYROIDISM, UNSPECIFIED TYPE: ICD-10-CM

## 2018-05-15 LAB
ALBUMIN SERPL BCP-MCNC: 4.3 G/DL (ref 3.2–4.9)
ALBUMIN/GLOB SERPL: 1.6 G/DL
ALP SERPL-CCNC: 90 U/L (ref 30–99)
ALT SERPL-CCNC: 15 U/L (ref 2–50)
ANION GAP SERPL CALC-SCNC: 10 MMOL/L (ref 0–11.9)
AST SERPL-CCNC: 16 U/L (ref 12–45)
BASOPHILS # BLD AUTO: 0.7 % (ref 0–1.8)
BASOPHILS # BLD: 0.05 K/UL (ref 0–0.12)
BILIRUB SERPL-MCNC: 0.8 MG/DL (ref 0.1–1.5)
BUN SERPL-MCNC: 33 MG/DL (ref 8–22)
CALCIUM SERPL-MCNC: 10.3 MG/DL (ref 8.5–10.5)
CHLORIDE SERPL-SCNC: 102 MMOL/L (ref 96–112)
CHOLEST SERPL-MCNC: 148 MG/DL (ref 100–199)
CO2 SERPL-SCNC: 25 MMOL/L (ref 20–33)
CREAT SERPL-MCNC: 1.33 MG/DL (ref 0.5–1.4)
EOSINOPHIL # BLD AUTO: 0.18 K/UL (ref 0–0.51)
EOSINOPHIL NFR BLD: 2.6 % (ref 0–6.9)
ERYTHROCYTE [DISTWIDTH] IN BLOOD BY AUTOMATED COUNT: 42.7 FL (ref 35.9–50)
GLOBULIN SER CALC-MCNC: 2.7 G/DL (ref 1.9–3.5)
GLUCOSE SERPL-MCNC: 96 MG/DL (ref 65–99)
HCT VFR BLD AUTO: 39.6 % (ref 37–47)
HDLC SERPL-MCNC: 59 MG/DL
HGB BLD-MCNC: 13.4 G/DL (ref 12–16)
IMM GRANULOCYTES # BLD AUTO: 0.02 K/UL (ref 0–0.11)
IMM GRANULOCYTES NFR BLD AUTO: 0.3 % (ref 0–0.9)
LDLC SERPL CALC-MCNC: 66 MG/DL
LYMPHOCYTES # BLD AUTO: 1.05 K/UL (ref 1–4.8)
LYMPHOCYTES NFR BLD: 14.9 % (ref 22–41)
MCH RBC QN AUTO: 30.9 PG (ref 27–33)
MCHC RBC AUTO-ENTMCNC: 33.8 G/DL (ref 33.6–35)
MCV RBC AUTO: 91.2 FL (ref 81.4–97.8)
MONOCYTES # BLD AUTO: 0.52 K/UL (ref 0–0.85)
MONOCYTES NFR BLD AUTO: 7.4 % (ref 0–13.4)
NEUTROPHILS # BLD AUTO: 5.21 K/UL (ref 2–7.15)
NEUTROPHILS NFR BLD: 74.1 % (ref 44–72)
NRBC # BLD AUTO: 0 K/UL
NRBC BLD-RTO: 0 /100 WBC
PLATELET # BLD AUTO: 230 K/UL (ref 164–446)
PMV BLD AUTO: 10.2 FL (ref 9–12.9)
POTASSIUM SERPL-SCNC: 4.1 MMOL/L (ref 3.6–5.5)
PROT SERPL-MCNC: 7 G/DL (ref 6–8.2)
RBC # BLD AUTO: 4.34 M/UL (ref 4.2–5.4)
SODIUM SERPL-SCNC: 137 MMOL/L (ref 135–145)
TRIGL SERPL-MCNC: 113 MG/DL (ref 0–149)
TSH SERPL DL<=0.005 MIU/L-ACNC: 1.17 UIU/ML (ref 0.38–5.33)
WBC # BLD AUTO: 7 K/UL (ref 4.8–10.8)

## 2018-05-15 PROCEDURE — 84443 ASSAY THYROID STIM HORMONE: CPT

## 2018-05-15 PROCEDURE — 80053 COMPREHEN METABOLIC PANEL: CPT

## 2018-05-15 PROCEDURE — 85025 COMPLETE CBC W/AUTO DIFF WBC: CPT

## 2018-05-15 PROCEDURE — 80061 LIPID PANEL: CPT

## 2018-05-15 PROCEDURE — 36415 COLL VENOUS BLD VENIPUNCTURE: CPT

## 2018-05-18 ENCOUNTER — OFFICE VISIT (OUTPATIENT)
Dept: MEDICAL GROUP | Facility: MEDICAL CENTER | Age: 68
End: 2018-05-18
Payer: MEDICARE

## 2018-05-18 VITALS
TEMPERATURE: 97.4 F | WEIGHT: 223.5 LBS | HEART RATE: 81 BPM | BODY MASS INDEX: 41.13 KG/M2 | OXYGEN SATURATION: 96 % | HEIGHT: 62 IN | DIASTOLIC BLOOD PRESSURE: 70 MMHG | SYSTOLIC BLOOD PRESSURE: 112 MMHG

## 2018-05-18 DIAGNOSIS — M54.50 CHRONIC MIDLINE LOW BACK PAIN WITHOUT SCIATICA: ICD-10-CM

## 2018-05-18 DIAGNOSIS — G47.33 OSA (OBSTRUCTIVE SLEEP APNEA): ICD-10-CM

## 2018-05-18 DIAGNOSIS — Z00.00 MEDICARE ANNUAL WELLNESS VISIT, INITIAL: ICD-10-CM

## 2018-05-18 DIAGNOSIS — M54.6 THORACIC SPINE PAIN: ICD-10-CM

## 2018-05-18 DIAGNOSIS — L71.9 ROSACEA: ICD-10-CM

## 2018-05-18 DIAGNOSIS — M54.2 CERVICAL SPINE PAIN: ICD-10-CM

## 2018-05-18 DIAGNOSIS — M25.561 CHRONIC PAIN OF RIGHT KNEE: ICD-10-CM

## 2018-05-18 DIAGNOSIS — G89.29 CHRONIC PAIN OF RIGHT KNEE: ICD-10-CM

## 2018-05-18 DIAGNOSIS — E66.01 MORBID OBESITY WITH BMI OF 40.0-44.9, ADULT (HCC): ICD-10-CM

## 2018-05-18 DIAGNOSIS — K21.9 GASTROESOPHAGEAL REFLUX DISEASE, ESOPHAGITIS PRESENCE NOT SPECIFIED: ICD-10-CM

## 2018-05-18 DIAGNOSIS — G89.29 CHRONIC MIDLINE LOW BACK PAIN WITHOUT SCIATICA: ICD-10-CM

## 2018-05-18 DIAGNOSIS — E78.5 DYSLIPIDEMIA: ICD-10-CM

## 2018-05-18 DIAGNOSIS — I10 ESSENTIAL HYPERTENSION: ICD-10-CM

## 2018-05-18 DIAGNOSIS — N18.30 CKD (CHRONIC KIDNEY DISEASE) STAGE 3, GFR 30-59 ML/MIN (HCC): ICD-10-CM

## 2018-05-18 DIAGNOSIS — E03.4 HYPOTHYROIDISM DUE TO ACQUIRED ATROPHY OF THYROID: ICD-10-CM

## 2018-05-18 PROBLEM — R06.02 SHORTNESS OF BREATH: Status: RESOLVED | Noted: 2017-07-19 | Resolved: 2018-05-18

## 2018-05-18 PROBLEM — R21 RASH OF FACE: Status: RESOLVED | Noted: 2017-03-14 | Resolved: 2018-05-18

## 2018-05-18 PROCEDURE — G0438 PPPS, INITIAL VISIT: HCPCS | Performed by: FAMILY MEDICINE

## 2018-05-18 RX ORDER — OMEPRAZOLE 20 MG/1
20 CAPSULE, DELAYED RELEASE ORAL
COMMUNITY
End: 2020-03-22

## 2018-05-18 RX ORDER — ROSUVASTATIN CALCIUM 10 MG/1
10 TABLET, COATED ORAL
Qty: 90 TAB | Refills: 3 | Status: SHIPPED | OUTPATIENT
Start: 2018-05-18 | End: 2019-05-22 | Stop reason: SDUPTHER

## 2018-05-18 RX ORDER — HYDROCHLOROTHIAZIDE 25 MG/1
25 TABLET ORAL
Qty: 90 TAB | Refills: 3 | Status: SHIPPED | OUTPATIENT
Start: 2018-05-18 | End: 2019-05-22 | Stop reason: SDUPTHER

## 2018-05-18 RX ORDER — METRONIDAZOLE 7.5 MG/G
1 GEL TOPICAL 2 TIMES DAILY
Qty: 1 TUBE | Refills: 2 | Status: SHIPPED | OUTPATIENT
Start: 2018-05-18 | End: 2019-04-05

## 2018-05-18 RX ORDER — METOPROLOL SUCCINATE 50 MG/1
50 TABLET, EXTENDED RELEASE ORAL
Qty: 90 TAB | Refills: 3 | Status: SHIPPED | OUTPATIENT
Start: 2018-05-18 | End: 2019-05-22 | Stop reason: SDUPTHER

## 2018-05-18 RX ORDER — TRIAMCINOLONE ACETONIDE 1 MG/G
1 CREAM TOPICAL 2 TIMES DAILY
Qty: 1 TUBE | Refills: 0 | Status: SHIPPED | OUTPATIENT
Start: 2018-05-18 | End: 2019-04-05

## 2018-05-18 RX ORDER — LISINOPRIL 40 MG/1
40 TABLET ORAL
Qty: 90 TAB | Refills: 3 | Status: SHIPPED | OUTPATIENT
Start: 2018-05-18 | End: 2019-05-22 | Stop reason: SDUPTHER

## 2018-05-18 RX ORDER — LEVOTHYROXINE SODIUM 0.07 MG/1
75 TABLET ORAL
Qty: 90 TAB | Refills: 3 | Status: SHIPPED | OUTPATIENT
Start: 2018-05-18 | End: 2019-05-22 | Stop reason: SDUPTHER

## 2018-05-18 ASSESSMENT — ENCOUNTER SYMPTOMS: GENERAL WELL-BEING: GOOD

## 2018-05-18 ASSESSMENT — ACTIVITIES OF DAILY LIVING (ADL): BATHING_REQUIRES_ASSISTANCE: 0

## 2018-05-18 ASSESSMENT — PATIENT HEALTH QUESTIONNAIRE - PHQ9: CLINICAL INTERPRETATION OF PHQ2 SCORE: 0

## 2018-05-18 ASSESSMENT — PAIN SCALES - GENERAL: PAINLEVEL: NO PAIN

## 2018-05-18 NOTE — PROGRESS NOTES
Chief Complaint   Patient presents with   • Annual Wellness Visit         HPI:  Prince is a 67 y.o. here for Medicare Annual Wellness Visit        Patient Active Problem List    Diagnosis Date Noted   • HTN (hypertension) 11/11/2013     Priority: High   • Dyslipidemia 09/30/2013     Priority: High   • CKD (chronic kidney disease) stage 3, GFR 30-59 ml/min 09/16/2015     Priority: Medium   • Chronic lower back pain 09/16/2015     Priority: Medium   • Chronic pain of right knee 09/16/2015     Priority: Medium   • GERD (gastroesophageal reflux disease) 09/30/2013     Priority: Medium   • Hypothyroid 09/30/2013     Priority: Medium   • Rosacea 05/18/2018   • GENIA (obstructive sleep apnea) 04/10/2017   • Morbid obesity with BMI of 40.0-44.9, adult (HCC) 03/14/2017   • Cervical spine pain 06/02/2016   • Thoracic spine pain 06/02/2016       Current Outpatient Prescriptions   Medication Sig Dispense Refill   • omeprazole (PRILOSEC) 20 MG delayed-release capsule Take 20 mg by mouth every day.     • triamcinolone acetonide (KENALOG) 0.1 % Cream Apply 1 Application to affected area(s) 2 times a day. To face 1 Tube 0   • metronidazole (METROGEL) 0.75 % gel Apply 1 Application to affected area(s) 2 times a day. To face 1 Tube 2   • rosuvastatin (CRESTOR) 10 MG Tab Take 1 Tab by mouth every day. 90 Tab 3   • metoprolol SR (TOPROL XL) 50 MG TABLET SR 24 HR Take 1 Tab by mouth every day. 90 Tab 3   • lisinopril (PRINIVIL, ZESTRIL) 40 MG tablet Take 1 Tab by mouth every day. 90 Tab 3   • levothyroxine (SYNTHROID) 75 MCG Tab Take 1 Tab by mouth every day. 90 Tab 3   • hydroCHLOROthiazide (HYDRODIURIL) 25 MG Tab Take 1 Tab by mouth every day. 90 Tab 3   • albuterol 108 (90 BASE) MCG/ACT Aero Soln inhalation aerosol Inhale 2 Puffs by mouth every 6 hours as needed for Shortness of Breath. 8.5 g 3   • polyethylene glycol 3350 (MIRALAX) Powder Take 17 g by mouth every day.     • calcium acetate (PHOS-LO) 667 MG Cap Take 1,334 mg by mouth 3  times a day, with meals.     • Fiber Powder Take  by mouth.     • calcium carbonate (TUMS) 500 MG Chew Tab Take 500 mg by mouth every day.     • Calcium 500 MG Chew Tab Take 1,000 mg by mouth every day.     • Multiple Vitamins-Minerals (MULTI-VITAMIN GUMMIES PO) Take  by mouth.     • multivitamin (THERAGRAN) Tab Take 1 Tab by mouth every day.       No current facility-administered medications for this visit.         Patient is taking medications as noted in medication list.  Current supplements as per medication list.     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     Is patient current with immunizations? Yes.    She  reports that she has never smoked. She has never used smokeless tobacco. She reports that she does not drink alcohol or use drugs.  Counseling given: Not Answered        DPA/Advanced directive: Patient does not have an Advanced Directive.  A packet and workshop information was given on Advanced Directives.    ROS:    Gait: Uses no assistive device   Ostomy: no   Other tubes: no   Amputations: no   Chronic oxygen use no   Last eye exam Within the last year    Wears hearing aids: no   : Reports urinary leakage during the last 6 months that has not interfered at all with their daily activities or sleep.        Depression Screening    Little interest or pleasure in doing things?  0 - not at all  Feeling down, depressed, or hopeless? 0 - not at all  Patient Health Questionnaire Score: 0    If depressive symptoms identified deferred to follow up visit unless specifically addressed in assessment and plan.    Interpretation of PHQ-9 Total Score   Score Severity   1-4 No Depression   5-9 Mild Depression   10-14 Moderate Depression   15-19 Moderately Severe Depression   20-27 Severe Depression    Screening for Cognitive  Impairment    Three Minute Recall (leader, season, table)  3/3    Paul clock face with all 12 numbers and set the hands to show 10 past 11.  Yes 5/5  If cognitive concerns identified, deferred for follow up unless specifically addressed in assessment and plan.    Fall Risk Assessment    Has the patient had two or more falls in the last year or any fall with injury in the last year?  No  If fall risk identified, deferred for follow up unless specifically addressed in assessment and plan.    Safety Assessment    Throw rugs on floor.  Yes  Handrails on all stairs.  Yes  Good lighting in all hallways.  Yes  Difficulty hearing.  No  Patient counseled about all safety risks that were identified.    Functional Assessment ADLs    Are there any barriers preventing you from cooking for yourself or meeting nutritional needs?  No.    Are there any barriers preventing you from driving safely or obtaining transportation?  No.    Are there any barriers preventing you from using a telephone or calling for help?  No.    Are there any barriers preventing you from shopping?   .    Are there any barriers preventing you from taking care of your own finances?  No.    Are there any barriers preventing you from managing your medications?  No.    Are there any barriers preventing you from showering, bathing or dressing yourself?  No.    Are you currently engaging in any exercise or physical activity?  Yes.  Pt works out for 80 minutes a day.   What is your perception of your health?  Good.    Health Maintenance Summary                Annual Wellness Visit Overdue 1950     PAP SMEAR Overdue 10/1/2015      Not specified 10/1/2012     MAMMOGRAM Next Due 10/10/2018      Done 10/10/2017 MA-MAMMO SCREENING BILAT W/TOMOSYNTHESIS W/CAD     Patient has more history with this topic...    IMM PNEUMOCOCCAL 65+ (ADULT) LOW/MEDIUM RISK SERIES Next Due 10/10/2018      Done 10/10/2017 Imm Admin: Pneumococcal polysaccharide vaccine (PPSV-23)     COLONOSCOPY Next Due 4/1/2019      Not specified 4/1/2009     BONE DENSITY Next Due 12/4/2020      Done 12/4/2015 DS-BONE DENSITY STUDY (DEXA)     Patient has more history with this topic...    IMM DTaP/Tdap/Td Vaccine Next Due 3/14/2027      Done 3/14/2017 Imm Admin: Tdap Vaccine     Patient has more history with this topic...          Patient Care Team:  Joao Ireland M.D. as PCP - General (Family Medicine)  Micheline Weber M.D. as Consulting Physician (Gynecology)  [facility] as Respiratory (DME Supplier)  JD Crandall (Dermatology)  Angelina DALE MD (Gastroenterology)  Karli Rodriguez (Dentistry)  Edward Reeves OD (Ophthalmology)  Dr. Juan Antonio Larsen MD (Neurosurgery)    Social History   Substance Use Topics   • Smoking status: Never Smoker   • Smokeless tobacco: Never Used   • Alcohol use No     Family History   Problem Relation Age of Onset   • Cancer Mother      Breast cancer   • Cancer Sister      Breast cancer   • Cancer Other    • Cancer Paternal Grandfather      liver   • Cancer Father      Pancreatic cancer   • Cancer Maternal Aunt    • Heart Disease Neg Hx      She  has a past medical history of Anesthesia; Arrhythmia; Arthritis; Back pain; Chest pain (May 2016); Chickenpox; DDD (degenerative disc disease), cervical; Enlarged tonsils; GERD (gastroesophageal reflux disease); Ukrainian measles; Hemorrhoids; High cholesterol; Hypertension; Obesity; Psychiatric disorder; and Skin cancer.   Past Surgical History:   Procedure Laterality Date   • CARPAL TUNNEL ENDOSCOPIC  10/28/2011    [facility]   • CARPAL TUNNEL ENDOSCOPIC  10/17/2011    Performed by EDGAR GARCIA at [facility]   • HYSTEROSCOPY WITH VIDEO DIAGNOSTIC  10/7/08    Performed by MICHELINE WEBER at [facility]   • DILATION AND CURETTAGE  10/7/08    Performed by MICHELINE WEBER at [facility]  "  • LUMPECTOMY Right 2007    Breast lump removed   • CARPAL TUNNEL RELEASE     • OTHER      hemorrhoid banding   • OTHER CARDIAC SURGERY      Cath - 4-5 years ago with ba   • TONSILLECTOMY             Exam:     Blood pressure 112/70, pulse 81, temperature 36.3 °C (97.4 °F), height 1.575 m (5' 2\"), weight 101.4 kg (223 lb 8 oz), SpO2 96 %. Body mass index is 40.88 kg/m².    Constitutional: Alert, no distress.  Skin: Warm, dry, good turgor, no rashes in visible areas.  Eye: Equal, round and reactive, conjunctiva clear, lids normal.  Psych: Alert and oriented x3, normal affect and mood.      Assessment and Plan. The following treatment and monitoring plan is recommended:    1. Medicare annual wellness visit, initial  Advised healthy lifestyle.  - Initial Wellness Visit - Includes PPPS ()    2. Morbid obesity with BMI of 40.0-44.9, adult ([facility])  Patient is exercising 80 minutes per morning.  - Patient identified as having weight management issue.  Appropriate orders and counseling given.  - Initial Wellness Visit - Includes PPPS ()    3. Gastroesophageal reflux disease, esophagitis presence not specified  Controlled with rare use of Tums.  - Initial Wellness Visit - Includes PPPS ()    4. Hypothyroidism due to acquired atrophy of thyroid  Controlled on labs.  Continue current medication.  - Initial Wellness Visit - Includes PPPS ()    5. CKD (chronic kidney disease) stage 3, GFR 30-59 ml/min  Stable.  Follow-up labs annually.  - Initial Wellness Visit - Includes PPPS ()    6. Essential hypertension  Controlled.  Continue current medication.  - Initial Wellness Visit - Includes PPPS ()    7. Dyslipidemia  Controlled.  Continue current medication.  - Initial Wellness Visit - Includes PPPS ()    8. Chronic pain of right knee  Advised Tylenol for pain.  - Initial Wellness Visit - Includes PPPS ()    9. Rosacea  Trial of metronidazole and she would like a refill on " triamcinolone 0.1% cream because it does work well after 1 or 2 applications to reduce redness significantly and she does not need to use it on an ongoing basis.  Discussed the risk of using long-term triamcinolone on face advised as little use as possible.  Hopefully metronidazole can be a long-term solution for her.  - triamcinolone acetonide (KENALOG) 0.1 % Cream; Apply 1 Application to affected area(s) 2 times a day. To face  Dispense: 1 Tube; Refill: 0  - metronidazole (METROGEL) 0.75 % gel; Apply 1 Application to affected area(s) 2 times a day. To face  Dispense: 1 Tube; Refill: 2  - Initial Wellness Visit - Includes PPPS ()    10. Chronic midline low back pain without sciatica  Doing well.  Continue with exercises.  Follow-up with Dr. Larsen if needed.  - Initial Wellness Visit - Includes PPPS ()    11. Cervical spine pain  Doing well.  Continue with exercises.  Follow-up with Dr. Larsen if needed.  - Initial Wellness Visit - Includes PPPS ()    12. Thoracic spine pain  Doing well.  Continue with exercises.  Follow-up with Dr. aLrsen if needed.  - Initial Wellness Visit - Includes PPPS ()    13. GENIA (obstructive sleep apnea)  Feeling much better after getting started with CPAP treatment.  Continue CPAP.  - Initial Wellness Visit - Includes PPPS ()        Services suggested: No services needed at this time  Health Care Screening recommendations as per orders if indicated.  Referrals offered: PT/OT/Nutrition counseling/Behavioral Health/Smoking cessation as per orders if indicated.    Discussion today about general wellness and lifestyle habits:    · Prevent falls and reduce trip hazards; Cautioned about securing or removing rugs.  · Have a working fire alarm and carbon monoxide detector;   · Engage in regular physical activity and social activities       Follow-up: Return in about 1 year (around 5/18/2019) for Annual.

## 2018-05-29 ENCOUNTER — TELEPHONE (OUTPATIENT)
Dept: MEDICAL GROUP | Facility: MEDICAL CENTER | Age: 68
End: 2018-05-29

## 2018-05-29 DIAGNOSIS — M72.2 PLANTAR FASCIITIS: ICD-10-CM

## 2018-05-29 NOTE — TELEPHONE ENCOUNTER
1. Caller Name: Pt                                         Call Back Number: [phone] (home)         Patient approves a detailed voicemail message: N\A    2. SPECIFIC Action To Be Taken: Referral requested    3. Diagnosis/Clinical Reason for Request: Plantar fasciitis concerns. Pt states that she is has gone to this doctor before.   Pt states that she is in a lot of pain, pt is willing to go see a different doctor if she can get in sooner.     4. Specialty & Provider Name/Lab/Imaging Location: Aryan Arias MD  5. Is appointment scheduled for requested order/referral: Yes-6/25/18    Patient informed they will receive a return phone call from the office ONLY if there are any questions before processing their request. Advised to call back if they haven't received a call from the referral department in 5 days.

## 2018-05-30 ENCOUNTER — APPOINTMENT (OUTPATIENT)
Dept: RADIOLOGY | Facility: MEDICAL CENTER | Age: 68
End: 2018-05-30
Attending: EMERGENCY MEDICINE
Payer: MEDICARE

## 2018-05-30 ENCOUNTER — HOSPITAL ENCOUNTER (EMERGENCY)
Facility: MEDICAL CENTER | Age: 68
End: 2018-05-30
Attending: EMERGENCY MEDICINE
Payer: MEDICARE

## 2018-05-30 VITALS
SYSTOLIC BLOOD PRESSURE: 131 MMHG | HEART RATE: 71 BPM | BODY MASS INDEX: 42.4 KG/M2 | TEMPERATURE: 97.3 F | OXYGEN SATURATION: 96 % | HEIGHT: 62 IN | DIASTOLIC BLOOD PRESSURE: 63 MMHG | RESPIRATION RATE: 20 BRPM | WEIGHT: 230.38 LBS

## 2018-05-30 DIAGNOSIS — M79.671 RIGHT FOOT PAIN: ICD-10-CM

## 2018-05-30 PROCEDURE — 73630 X-RAY EXAM OF FOOT: CPT | Mod: RT

## 2018-05-30 PROCEDURE — 99284 EMERGENCY DEPT VISIT MOD MDM: CPT

## 2018-05-30 ASSESSMENT — PAIN SCALES - GENERAL
PAINLEVEL_OUTOF10: 3
PAINLEVEL_OUTOF10: 3

## 2018-05-30 NOTE — ED PROVIDER NOTES
"CHIEF COMPLAINT  Chief Complaint   Patient presents with   • Foot Pain     Right       HPI  Prince Mazariegos is a 68 y.o. female who presents with right lateral foot pain over the past approximately 10 days. Denies any direct trauma to that area other than her 50 pound dog jumping on her foot. No open wounds there. Continues to be able to ambulate without difficulty though with pain. No knee pain or hip pain. Does have a history of plantar fasciitis.    REVIEW OF SYSTEMS  See HPI for further details. All other systems are negative.     PAST MEDICAL HISTORY   has a past medical history of Anesthesia; Arrhythmia; Arthritis; Back pain; Chest pain (May 2016); Chickenpox; DDD (degenerative disc disease), cervical; Enlarged tonsils; GERD (gastroesophageal reflux disease); [place]amanian measles; Hemorrhoids; High cholesterol; Hypertension; Obesity; Psychiatric disorder; and Skin cancer.    SOCIAL HISTORY  Social History     Social History Main Topics   • Smoking status: Never Smoker   • Smokeless tobacco: Never Used   • Alcohol use No   • Drug use: No   • Sexual activity: Not on file       SURGICAL HISTORY   has a past surgical history that includes other cardiac surgery; lumpectomy (Right, 2007); hysteroscopy with video diagnostic (10/7/08); dilation and curettage (10/7/08); carpal tunnel endoscopic (10/17/2011); carpal tunnel endoscopic (10/28/2011); other; tonsillectomy; and carpal tunnel release.    CURRENT MEDICATIONS  Home Medications    **Home medications have not yet been reviewed for this encounter**         ALLERGIES  Allergies   Allergen Reactions   • Aleve [Gnp Naproxen Sodium]    • Latex Rash   • Naproxen Sodium Hives   • Other Misc Swelling     Some soap & toothpaste   • Skelaxin [Metaxalone] Hives   • Sunscreens        PHYSICAL EXAM  VITAL SIGNS: /63   Pulse 73   Temp 36.3 °C (97.3 °F)   Resp 18   Ht 1.575 m (5' 2\")   Wt 104.5 kg (230 lb 6.1 oz)   SpO2 97%   BMI 42.14 kg/m²   Pulse ox " "interpretation: I interpret this pulse ox as normal.  Constitutional: Alert in no apparent distress.  Cardiovascular: Regular rate and rhythm.   Thorax & Lungs: No respiratory distress  Skin: Warm, Dry, No erythema, No rash.   Extremities: Intact distal pulses, No edema, tenderness to the lateral aspect of the right foot tenderness, No cyanosis  Musculoskeletal: Good range of motion in all major joints. No major deformities noted.       DIAGNOSTIC STUDIES / PROCEDURES    RADIOLOGY  DX-FOOT-COMPLETE 3+ RIGHT   Final Result         1.  No acute traumatic bony injury.          COURSE & MEDICAL DECISION MAKING  Pertinent Labs & Imaging studies reviewed. (See chart for details)  68 y.o. female presenting with right foot pain especially to the lateral aspect over the past 10 days. Continues to be able to ambulate. No obvious deformities. No swelling. No erythema. No open wounds. Neurovascular intact distally. Only minimal trauma history with a dog jumped on her foot. X-ray was performed that was found to be unremarkable. No obvious fracture/occult fracture. Unclear etiology of the patient's pain symptoms at this time. May be an overuse injury. May be isolated contusion. Regardless, appears to be rather benign in nature at this time. Instructed to follow-up with her primary care physician for further management. To take acetaminophen as needed for pain symptoms. She states that she does have Tylenol 3 at home left over. Recommending taking those medications to see if that helps improve her pain symptoms.    The patient will not drink alcohol nor drive with prescribed medications.   The patient will return for worsening symptoms or failure of improvement and is stable at the time of discharge. The patient verbalizes understanding in their own words.    /63   Pulse 71   Temp 36.3 °C (97.3 °F)   Resp 20   Ht 1.575 m (5' 2\")   Wt 104.5 kg (230 lb 6.1 oz)   SpO2 96%   BMI 42.14 kg/m²     The patient was referred to " primary care where they will receive further BP management.      Joao Ireland M.D.  [address]  [address]  [place] [id number] [phone]    In 2 days      [facility], Emergency Dept  [address]  [place] [id number] [phone]    As needed, If symptoms worsen      FINAL IMPRESSION  1. Right foot pain            Electronically signed by: Parish Hubbard, 5/30/2018 12:35 AM

## 2018-05-30 NOTE — ED NOTES
ERP in to discuss plan of car with Pt.   Patient provided printed discharge instructions which included signs and symptoms to look out for, why to return to ER, and primary care follow up appointment to make. Patient stated they understand discharge instructions and had no further questions or concerns at this time. Patient discharged to home by self. Patient ambulated out of ER with stable gait.

## 2018-05-30 NOTE — DISCHARGE INSTRUCTIONS
Foot Pain  Introduction    Many things can cause foot pain. Some common causes are:  · An injury.  · A sprain.  · Arthritis.  · Blisters.  · Bunions.  Follow these instructions at home:  Pay attention to any changes in your symptoms. Take these actions to help with your discomfort:  · If directed, put ice on the affected area:  ? Put ice in a plastic bag.  ? Place a towel between your skin and the bag.  ? Leave the ice on for 15-20 minutes, 3?4 times a day for 2 days.  · Take over-the-counter and prescription medicines only as told by your health care provider.  · Wear comfortable, supportive shoes that fit you well. Do not wear high heels.  · Do not stand or walk for long periods of time.  · Do not lift a lot of weight. This can put added pressure on your feet.  · Do stretches to relieve foot pain and stiffness as told by your health care provider.  · Rub your foot gently.  · Keep your feet clean and dry.  Contact a health care provider if:  · Your pain does not get better after a few days of self-care.  · Your pain gets worse.  · You cannot stand on your foot.  Get help right away if:  · Your foot is numb or tingling.  · Your foot or toes are swollen.  · Your foot or toes turn white or blue.  · You have warmth and redness along your foot.  This information is not intended to replace advice given to you by your health care provider. Make sure you discuss any questions you have with your health care provider.  Document Released: 01/13/2017 Document Revised: 05/25/2017 Document Reviewed: 01/13/2016  © 2017 [employer]

## 2018-05-30 NOTE — ED NOTES
Pt to ED bib self, pt complaining of atraumatic R foot pain. Pt states there Is some slight relief when she stretches her foot.

## 2018-05-30 NOTE — ED NOTES
Pt sitting on bed. ERP was in to see pt. Pt c/o right foot pain for past 10 days, states she has a history of plantar fascitis and her dog stepped on the medial aspect of her foot, but it hurts most on dorsal aspect of right foot, and keeps her awake at night at times. X ray done in room.

## 2018-06-04 ENCOUNTER — OFFICE VISIT (OUTPATIENT)
Dept: MEDICAL GROUP | Facility: MEDICAL CENTER | Age: 68
End: 2018-06-04
Payer: MEDICARE

## 2018-06-04 VITALS
TEMPERATURE: 97.8 F | OXYGEN SATURATION: 96 % | SYSTOLIC BLOOD PRESSURE: 132 MMHG | DIASTOLIC BLOOD PRESSURE: 70 MMHG | HEIGHT: 62 IN | HEART RATE: 74 BPM | WEIGHT: 223 LBS | BODY MASS INDEX: 41.04 KG/M2 | RESPIRATION RATE: 20 BRPM

## 2018-06-04 DIAGNOSIS — M72.2 PLANTAR FASCIITIS OF RIGHT FOOT: ICD-10-CM

## 2018-06-04 DIAGNOSIS — M79.671 RIGHT FOOT PAIN: ICD-10-CM

## 2018-06-04 PROCEDURE — 99213 OFFICE O/P EST LOW 20 MIN: CPT | Performed by: FAMILY MEDICINE

## 2018-06-04 RX ORDER — CHOLECALCIFEROL (VITAMIN D3) 50 MCG
2000 TABLET ORAL EVERY EVENING
COMMUNITY

## 2018-06-04 ASSESSMENT — PAIN SCALES - GENERAL: PAINLEVEL: 6=MODERATE PAIN

## 2018-06-04 NOTE — PATIENT INSTRUCTIONS
Tylenol  #2 350 mg in morning  Tylenol #3 950 mg before bed  If you wake in the middle of the night take Tylenol #2 350 mg then  Ice to the area twice a day  Okay to use 4 gm of Tylenol daily for 2-4 weeks      Plantar Fasciitis  Plantar fasciitis is a painful foot condition that affects the heel. It occurs when the band of tissue that connects the toes to the heel bone (plantar fascia) becomes irritated. This can happen after exercising too much or doing other repetitive activities (overuse injury). The pain from plantar fasciitis can range from mild irritation to severe pain that makes it difficult for you to walk or move. The pain is usually worse in the morning or after you have been sitting or lying down for a while.  CAUSES  This condition may be caused by:  · Standing for long periods of time.  · Wearing shoes that do not fit.  · Doing high-impact activities, including running, aerobics, and ballet.  · Being overweight.  · Having an abnormal way of walking (gait).  · Having tight calf muscles.  · Having high arches in your feet.  · Starting a new athletic activity.  SYMPTOMS  The main symptom of this condition is heel pain. Other symptoms include:  · Pain that gets worse after activity or exercise.  · Pain that is worse in the morning or after resting.  · Pain that goes away after you walk for a few minutes.  DIAGNOSIS  This condition may be diagnosed based on your signs and symptoms. Your health care provider will also do a physical exam to check for:  · A tender area on the bottom of your foot.  · A high arch in your foot.  · Pain when you move your foot.  · Difficulty moving your foot.  You may also need to have imaging studies to confirm the diagnosis. These can include:  · X-rays.  · Ultrasound.  · MRI.  TREATMENT   Treatment for plantar fasciitis depends on the severity of the condition. Your treatment may include:  · Rest, ice, and over-the-counter pain medicines to manage your pain.  · Exercises to  stretch your calves and your plantar fascia.  · A splint that holds your foot in a stretched, upward position while you sleep (night splint).  · Physical therapy to relieve symptoms and prevent problems in the future.  · Cortisone injections to relieve severe pain.  · Extracorporeal shock wave therapy (ESWT) to stimulate damaged plantar fascia with electrical impulses. It is often used as a last resort before surgery.  · Surgery, if other treatments have not worked after 12 months.  HOME CARE INSTRUCTIONS  · Take medicines only as directed by your health care provider.  · Avoid activities that cause pain.  · Roll the bottom of your foot over a bag of ice or a bottle of cold water. Do this for 20 minutes, 3-4 times a day.  · Perform simple stretches as directed by your health care provider.  · Try wearing athletic shoes with air-sole or gel-sole cushions or soft shoe inserts.  · Wear a night splint while sleeping, if directed by your health care provider.  · Keep all follow-up appointments with your health care provider.  PREVENTION   · Do not perform exercises or activities that cause heel pain.  · Consider finding low-impact activities if you continue to have problems.  · Lose weight if you need to.  The best way to prevent plantar fasciitis is to avoid the activities that aggravate your plantar fascia.  SEEK MEDICAL CARE IF:  · Your symptoms do not go away after treatment with home care measures.  · Your pain gets worse.  · Your pain affects your ability to move or do your daily activities.  This information is not intended to replace advice given to you by your health care provider. Make sure you discuss any questions you have with your health care provider.  Document Released: 09/12/2002 Document Revised: 04/10/2017 Document Reviewed: 10/28/2015  [employer] Interactive Patient Education © 2017 [employer]

## 2018-06-11 NOTE — ASSESSMENT & PLAN NOTE
Patient complains of persistent pain in the right foot especially when she first steps on it or if she has been standing for a while.  She denies any trauma.  She denies any swelling.

## 2018-06-11 NOTE — PROGRESS NOTES
Subjective:     Chief Complaint   Patient presents with   • Foot Pain     has appointment with Podiatrist June 25th, [facility]       Prince Mazariegos is a 68 y.o. female here today for evaluation and management of:    Right foot pain  Patient complains of persistent pain in the right foot especially when she first steps on it or if she has been standing for a while.  She denies any trauma.  She denies any swelling.       Allergies   Allergen Reactions   • Aleve [Gnp Naproxen Sodium]    • Latex Rash   • Naproxen Sodium Hives   • Other Misc Swelling     Some soap & toothpaste   • Skelaxin [Metaxalone] Hives   • Sunscreens        Current medicines (including changes today)  Current Outpatient Prescriptions   Medication Sig Dispense Refill   • Cholecalciferol (VITAMIN D) 2000 UNIT Tab Take  by mouth every day.     • Probiotic Product (PROBIOTIC-10 PO) Take  by mouth.     • rosuvastatin (CRESTOR) 10 MG Tab Take 1 Tab by mouth every day. 90 Tab 3   • metoprolol SR (TOPROL XL) 50 MG TABLET SR 24 HR Take 1 Tab by mouth every day. 90 Tab 3   • lisinopril (PRINIVIL, ZESTRIL) 40 MG tablet Take 1 Tab by mouth every day. 90 Tab 3   • levothyroxine (SYNTHROID) 75 MCG Tab Take 1 Tab by mouth every day. 90 Tab 3   • hydroCHLOROthiazide (HYDRODIURIL) 25 MG Tab Take 1 Tab by mouth every day. 90 Tab 3   • polyethylene glycol 3350 (MIRALAX) Powder Take 17 g by mouth every day.     • Fiber Powder Take  by mouth.     • Calcium 500 MG Chew Tab Take 1,000 mg by mouth every day.     • Multiple Vitamins-Minerals (MULTI-VITAMIN GUMMIES PO) Take  by mouth.     • omeprazole (PRILOSEC) 20 MG delayed-release capsule Take 20 mg by mouth every day.     • triamcinolone acetonide (KENALOG) 0.1 % Cream Apply 1 Application to affected area(s) 2 times a day. To face 1 Tube 0   • metronidazole (METROGEL) 0.75 % gel Apply 1 Application to affected area(s) 2 times a day. To face 1 Tube 2   • albuterol 108 (90 BASE) MCG/ACT Aero Soln inhalation aerosol  "Inhale 2 Puffs by mouth every 6 hours as needed for Shortness of Breath. 8.5 g 3   • calcium acetate (PHOS-LO) 667 MG Cap Take 1,334 mg by mouth 3 times a day, with meals.     • multivitamin (THERAGRAN) Tab Take 1 Tab by mouth every day.     • calcium carbonate (TUMS) 500 MG Chew Tab Take 500 mg by mouth every day.       No current facility-administered medications for this visit.        She  has a past medical history of Anesthesia; Arrhythmia; Arthritis; Back pain; Chest pain (May 2016); Chickenpox; DDD (degenerative disc disease), cervical; Enlarged tonsils; GERD (gastroesophageal reflux disease); [place] measles; Hemorrhoids; High cholesterol; Hypertension; Obesity; Psychiatric disorder; and Skin cancer.    Patient Active Problem List    Diagnosis Date Noted   • HTN (hypertension) 11/11/2013     Priority: High   • Dyslipidemia 09/30/2013     Priority: High   • CKD (chronic kidney disease) stage 3, GFR 30-59 ml/min 09/16/2015     Priority: Medium   • Chronic lower back pain 09/16/2015     Priority: Medium   • Chronic pain of right knee 09/16/2015     Priority: Medium   • GERD (gastroesophageal reflux disease) 09/30/2013     Priority: Medium   • Hypothyroid 09/30/2013     Priority: Medium   • Right foot pain 06/04/2018   • Rosacea 05/18/2018   • GENIA (obstructive sleep apnea) 04/10/2017   • Morbid obesity with BMI of 40.0-44.9, adult ([facility]) 03/14/2017   • Cervical spine pain 06/02/2016   • Thoracic spine pain 06/02/2016       ROS   No fever or chills.  No nausea or vomiting.  No chest pain or palpitations.  No cough or SOB.  No pain with urination or hematuria.  No black or bloody stools.       Objective:     Blood pressure 132/70, pulse 74, temperature 36.6 °C (97.8 °F), resp. rate 20, height 1.575 m (5' 2\"), weight 101.2 kg (223 lb), SpO2 96 %. Body mass index is 40.79 kg/m².   Physical Exam:  Well developed, well nourished.  Alert, oriented in no acute distress.  Eye contact is good, speech goal directed, affect " calm  Eyes: conjunctiva non-injected, sclera non-icteric.  Ankles: No noticeable deformity, swelling or bruising. Tenderness to palpation of plantar fascia.. ROM intact. Anterior drawer negative. 5/5 strength bilaterally. Sensation intact. 2+ pedal pulses.             Assessment and Plan:   The following treatment plan was discussed    1. Right foot pain  Ice to area.  Stretches given  - REFERRAL TO PODIATRY    2. Plantar fasciitis of right foot  Patient education done with handouts on stretching given.  Refer to podiatry.  - REFERRAL TO PODIATRY    Any change or worsening of signs or symptoms, patient encouraged to follow-up or report to the emergency room for further evaluation. Patient understands and agrees.    Followup: Return if symptoms worsen or fail to improve.

## 2018-07-10 ENCOUNTER — APPOINTMENT (RX ONLY)
Dept: URBAN - METROPOLITAN AREA CLINIC 20 | Facility: CLINIC | Age: 68
Setting detail: DERMATOLOGY
End: 2018-07-10

## 2018-07-10 DIAGNOSIS — L82.0 INFLAMED SEBORRHEIC KERATOSIS: ICD-10-CM

## 2018-07-10 DIAGNOSIS — L71.8 OTHER ROSACEA: ICD-10-CM

## 2018-07-10 PROCEDURE — 99213 OFFICE O/P EST LOW 20 MIN: CPT | Mod: 25

## 2018-07-10 PROCEDURE — 17110 DESTRUCTION B9 LES UP TO 14: CPT

## 2018-07-10 PROCEDURE — ? PRESCRIPTION

## 2018-07-10 PROCEDURE — ? COUNSELING

## 2018-07-10 PROCEDURE — ? ADDITIONAL NOTES

## 2018-07-10 PROCEDURE — ? LIQUID NITROGEN

## 2018-07-10 RX ORDER — METRONIDAZOLE 10 MG/G
GEL TOPICAL
Qty: 1 | Refills: 6 | Status: ERX

## 2018-07-10 ASSESSMENT — LOCATION ZONE DERM
LOCATION ZONE: FACE
LOCATION ZONE: NOSE
LOCATION ZONE: TRUNK
LOCATION ZONE: ARM

## 2018-07-10 ASSESSMENT — LOCATION SIMPLE DESCRIPTION DERM
LOCATION SIMPLE: NOSE
LOCATION SIMPLE: LEFT LOWER BACK
LOCATION SIMPLE: LEFT CHEEK
LOCATION SIMPLE: RIGHT FOREHEAD
LOCATION SIMPLE: RIGHT FOREARM
LOCATION SIMPLE: RIGHT CHEEK

## 2018-07-10 ASSESSMENT — LOCATION DETAILED DESCRIPTION DERM
LOCATION DETAILED: LEFT CENTRAL MALAR CHEEK
LOCATION DETAILED: LEFT INFERIOR LATERAL MIDBACK
LOCATION DETAILED: RIGHT CENTRAL MALAR CHEEK
LOCATION DETAILED: RIGHT PROXIMAL DORSAL FOREARM
LOCATION DETAILED: RIGHT MEDIAL FOREHEAD
LOCATION DETAILED: NASAL DORSUM

## 2018-07-10 NOTE — HPI: RASH (ROSACEA)
How Severe Is Your Rosacea?: moderate
Is This A New Presentation, Or A Follow-Up?: Follow Up Rosacea
Additional History: Pt has tried doxy unable to tolerate.

## 2018-07-10 NOTE — PROCEDURE: ADDITIONAL NOTES
Additional Notes: Plan: \\n1. Apply Avar cleanser and Ovace lotion to face in AM. (Samples given)\\n2. Apply Metrogel to face at bedtime.

## 2018-07-19 ENCOUNTER — SLEEP CENTER VISIT (OUTPATIENT)
Dept: SLEEP MEDICINE | Facility: MEDICAL CENTER | Age: 68
End: 2018-07-19
Payer: MEDICARE

## 2018-07-19 VITALS
DIASTOLIC BLOOD PRESSURE: 64 MMHG | RESPIRATION RATE: 16 BRPM | HEART RATE: 76 BPM | HEIGHT: 62 IN | SYSTOLIC BLOOD PRESSURE: 122 MMHG | TEMPERATURE: 97.7 F | BODY MASS INDEX: 42.14 KG/M2 | OXYGEN SATURATION: 96 % | WEIGHT: 229 LBS

## 2018-07-19 DIAGNOSIS — E66.01 MORBID OBESITY WITH BMI OF 40.0-44.9, ADULT (HCC): ICD-10-CM

## 2018-07-19 DIAGNOSIS — G47.33 OSA (OBSTRUCTIVE SLEEP APNEA): ICD-10-CM

## 2018-07-19 PROCEDURE — 99212 OFFICE O/P EST SF 10 MIN: CPT | Performed by: NURSE PRACTITIONER

## 2018-07-19 NOTE — PROGRESS NOTES
CC:  Here for f/u sleep issues as listed below    HPI:     Prince presents today for follow up obstructive  apnea. HSS from June 2016 showed an AHI of 12.2 with low oxygenation of 76%.  Currently she is being treated with CPAP @ [id number]. Compliance download from the dates 6/19/2018 - 7/18/2018 indicates she is wearing the device 100% for an avg of 8 hours and 53 minutes per night with a reduced AHI of 0.8.  She does tolerate pressure and mask well.  She wakes up refreshed and denies morning H/A. she sleeps better overall and not not naping like she used to. C/o of nasal dryness has resolved. She will continue to clean supplies weekly and change them as insurance allows.     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 She has not used it, but happy to have it on hand.             Patient Active Problem List    Diagnosis Date Noted   • HTN (hypertension) 11/11/2013     Priority: High   • Dyslipidemia 09/30/2013     Priority: High   • CKD (chronic kidney disease) stage 3, GFR 30-59 ml/min 09/16/2015     Priority: Medium   • Chronic lower back pain 09/16/2015     Priority: Medium   • Chronic pain of right knee 09/16/2015     Priority: Medium   • GERD (gastroesophageal reflux disease) 09/30/2013     Priority: Medium   • Hypothyroid 09/30/2013     Priority: Medium   • Right foot pain 06/04/2018   • Rosacea 05/18/2018   • GENIA (obstructive sleep apnea) 04/10/2017   • Morbid obesity with BMI of 40.0-44.9, adult ([facility]) 03/14/2017   • Cervical spine pain 06/02/2016   • Thoracic spine pain 06/02/2016       Past Medical History:   Diagnosis Date   • Anesthesia     PONV   • Arrhythmia    • Arthritis    • Back pain    • Chest pain May 2016    PET scan with no infarct or ischemia, normal LVEF.   • Chickenpox    • DDD (degenerative disc disease), cervical    • Enlarged tonsils     Removed with adenoids   • GERD (gastroesophageal reflux disease)     • [place] measles    • Hemorrhoids     bleeding   • High cholesterol    • Hypertension     Medicated   • Obesity    • Psychiatric disorder     Anxiety - medicated   • Skin cancer     skin       Past Surgical History:   Procedure Laterality Date   • CARPAL TUNNEL ENDOSCOPIC  10/28/2011    Performed by EDGAR GARCIA at SURGERY SAME DAY [facility] ORS   • CARPAL TUNNEL ENDOSCOPIC  10/17/2011    Performed by EDGAR GARCIA at SURGERY SAME DAY [facility] ORS   • HYSTEROSCOPY WITH VIDEO DIAGNOSTIC  10/7/08    Performed by MICHELINE WEBER at SURGERY SAME DAY [facility] ORS   • DILATION AND CURETTAGE  10/7/08    Performed by MICHELINE WEBER at SURGERY SAME DAY [facility] ORS   • LUMPECTOMY Right 2007    Breast lump removed   • CARPAL TUNNEL RELEASE     • OTHER      hemorrhoid banding   • OTHER CARDIAC SURGERY      Cath - 4-5 years ago with ba   • TONSILLECTOMY         Family History   Problem Relation Age of Onset   • Cancer Mother      Breast cancer   • Cancer Sister      Breast cancer   • Cancer Other    • Cancer Paternal Grandfather      liver   • Cancer Father      Pancreatic cancer   • Cancer Maternal Aunt    • Heart Disease Neg Hx        Social History     Social History   • Marital status: Single     Spouse name: N/A   • Number of children: N/A   • Years of education: N/A     Occupational History   • Not on file.     Social History Main Topics   • Smoking status: Never Smoker   • Smokeless tobacco: Never Used   • Alcohol use No   • Drug use: No   • Sexual activity: Not on file     Other Topics Concern   • Not on file     Social History Narrative   • No narrative on file       Current Outpatient Prescriptions   Medication Sig Dispense Refill   • Cholecalciferol (VITAMIN D) 2000 UNIT Tab Take  by mouth every day.     • Probiotic Product (PROBIOTIC-10 PO) Take  by mouth.     • omeprazole (PRILOSEC) 20 MG delayed-release capsule Take 20 mg by mouth every day.     • rosuvastatin (CRESTOR) 10 MG Tab  "Take 1 Tab by mouth every day. 90 Tab 3   • metoprolol SR (TOPROL XL) 50 MG TABLET SR 24 HR Take 1 Tab by mouth every day. 90 Tab 3   • lisinopril (PRINIVIL, ZESTRIL) 40 MG tablet Take 1 Tab by mouth every day. 90 Tab 3   • levothyroxine (SYNTHROID) 75 MCG Tab Take 1 Tab by mouth every day. 90 Tab 3   • hydroCHLOROthiazide (HYDRODIURIL) 25 MG Tab Take 1 Tab by mouth every day. 90 Tab 3   • polyethylene glycol 3350 (MIRALAX) Powder Take 17 g by mouth every day.     • multivitamin (THERAGRAN) Tab Take 1 Tab by mouth every day.     • Fiber Powder Take  by mouth.     • Calcium 500 MG Chew Tab Take 1,000 mg by mouth every day.     • Multiple Vitamins-Minerals (MULTI-VITAMIN GUMMIES PO) Take  by mouth.     • triamcinolone acetonide (KENALOG) 0.1 % Cream Apply 1 Application to affected area(s) 2 times a day. To face 1 Tube 0   • metronidazole (METROGEL) 0.75 % gel Apply 1 Application to affected area(s) 2 times a day. To face 1 Tube 2   • albuterol 108 (90 BASE) MCG/ACT Aero Soln inhalation aerosol Inhale 2 Puffs by mouth every 6 hours as needed for Shortness of Breath. 8.5 g 3   • calcium acetate (PHOS-LO) 667 MG Cap Take 1,334 mg by mouth 3 times a day, with meals.     • calcium carbonate (TUMS) 500 MG Chew Tab Take 500 mg by mouth every day.       No current facility-administered medications for this visit.           Allergies: Aleve [gnp naproxen sodium]; Latex; Naproxen sodium; Other misc; Skelaxin [metaxalone]; and Sunscreens      ROS   Gen: Denies fever, chills, unintentional weight loss, fatigue  Resp:Denies Dyspnea  CV: Denies chest pain, chest tightness  Sleep:Denies morning headache, insomnia, daytime somnolence, snoring, gasping for air, apnea  Neuro: Denies frequent headaches, weakness, dizziness  See HPI.  All other systems reviewed and negative        Vital signs for this encounter:  Vitals:    07/19/18 0758   Height: 1.575 m (5' 2\")   Weight: 103.9 kg (229 lb)   Weight % change since last entry.: 0 %   BP: " 122/64   Pulse: 76   BMI (Calculated): 41.88   Resp: 16   Temp: 36.5 °C (97.7 °F)   O2 sat % room air: 96 %                   Physical Exam:   Gen:         Alert and oriented, No apparent distress.   Neck:        No Lymphadenopathy.  Lungs:     Clear to auscultation bilaterally.    CV:          Regular rate and rhythm. No murmurs, rubs or gallops.   Abd:         Soft non tender, non distended.            Ext:          No clubbing, cyanosis, edema.    Assessment   1. GENIA (obstructive sleep apnea)  DME MASK AND SUPPLIES   2. Morbid obesity with BMI of 40.0-44.9, adult ([facility])  OBESITY COUNSELING (No Charge): Patient identified as having weight management issue.  Appropriate orders and counseling given.       Patient is clinically stable and will proceed with following plan.     PLAN:   Patient Instructions   1) Continue CPAP at [id number]  2) Clean mask and supplies weekly and change them as insurance allows   3) Rescue inhaler for dyspnea  4) Vaccines: Up to date with Pneumovax 23 and flu  5) Return in about 1 year (around 7/19/2019) for Compliance, review of symptoms, if not sooner, follow up with JD Luke.

## 2018-07-19 NOTE — PATIENT INSTRUCTIONS
1) Continue CPAP at [id number]  2) Clean mask and supplies weekly and change them as insurance allows   3) Rescue inhaler for dyspnea  4) Vaccines: Up to date with Pneumovax 23 and flu  5) Return in about 1 year (around 7/19/2019) for Compliance, review of symptoms, if not sooner, follow up with JD Luke.

## 2018-10-10 ENCOUNTER — NON-PROVIDER VISIT (OUTPATIENT)
Dept: MEDICAL GROUP | Facility: MEDICAL CENTER | Age: 68
End: 2018-10-10
Payer: MEDICARE

## 2018-10-10 DIAGNOSIS — Z23 NEED FOR VACCINATION: ICD-10-CM

## 2018-10-10 PROCEDURE — G0009 ADMIN PNEUMOCOCCAL VACCINE: HCPCS | Performed by: FAMILY MEDICINE

## 2018-10-10 PROCEDURE — 90732 PPSV23 VACC 2 YRS+ SUBQ/IM: CPT | Performed by: FAMILY MEDICINE

## 2018-10-10 NOTE — PROGRESS NOTES
"Prince Mazariegos is a 68 y.o. female here for a non-provider visit for:   PNEUMOVAX (PPSV23)    Reason for immunization: continue or complete series started at the office  Immunization records indicate need for vaccine: Yes, confirmed with Epic  Minimum interval has been met for this vaccine: Yes  ABN completed: No    Order and dose verified by: ACE  VIS Dated  4/24/15 was given to patient: Yes  All IAC Questionnaire questions were answered \"No.\"    Patient tolerated injection and no adverse effects were observed or reported: Yes    Pt scheduled for next dose in series: No  "

## 2018-10-30 ENCOUNTER — HOSPITAL ENCOUNTER (OUTPATIENT)
Dept: RADIOLOGY | Facility: MEDICAL CENTER | Age: 68
End: 2018-10-30
Attending: SPECIALIST
Payer: MEDICARE

## 2018-10-30 DIAGNOSIS — Z12.31 VISIT FOR SCREENING MAMMOGRAM: ICD-10-CM

## 2018-10-30 PROCEDURE — 77067 SCR MAMMO BI INCL CAD: CPT

## 2019-01-08 ENCOUNTER — APPOINTMENT (RX ONLY)
Dept: URBAN - METROPOLITAN AREA CLINIC 20 | Facility: CLINIC | Age: 69
Setting detail: DERMATOLOGY
End: 2019-01-08

## 2019-01-08 DIAGNOSIS — L71.8 OTHER ROSACEA: ICD-10-CM | Status: IMPROVED

## 2019-01-08 DIAGNOSIS — L57.0 ACTINIC KERATOSIS: ICD-10-CM

## 2019-01-08 DIAGNOSIS — L57.8 OTHER SKIN CHANGES DUE TO CHRONIC EXPOSURE TO NONIONIZING RADIATION: ICD-10-CM

## 2019-01-08 PROCEDURE — ? LIQUID NITROGEN

## 2019-01-08 PROCEDURE — ? COUNSELING

## 2019-01-08 PROCEDURE — 99213 OFFICE O/P EST LOW 20 MIN: CPT | Mod: 25

## 2019-01-08 PROCEDURE — 17003 DESTRUCT PREMALG LES 2-14: CPT

## 2019-01-08 PROCEDURE — 17000 DESTRUCT PREMALG LESION: CPT

## 2019-01-08 ASSESSMENT — LOCATION DETAILED DESCRIPTION DERM
LOCATION DETAILED: LEFT LATERAL EYEBROW
LOCATION DETAILED: RIGHT LATERAL EYEBROW
LOCATION DETAILED: LEFT INFERIOR MEDIAL FOREHEAD
LOCATION DETAILED: RIGHT FOREHEAD
LOCATION DETAILED: RIGHT SUPERIOR FOREHEAD
LOCATION DETAILED: NASAL DORSUM
LOCATION DETAILED: LEFT LATERAL FOREHEAD
LOCATION DETAILED: RIGHT MEDIAL FOREHEAD
LOCATION DETAILED: LEFT CENTRAL MALAR CHEEK
LOCATION DETAILED: RIGHT CENTRAL MALAR CHEEK

## 2019-01-08 ASSESSMENT — LOCATION ZONE DERM
LOCATION ZONE: NOSE
LOCATION ZONE: FACE

## 2019-01-08 ASSESSMENT — LOCATION SIMPLE DESCRIPTION DERM
LOCATION SIMPLE: RIGHT EYEBROW
LOCATION SIMPLE: LEFT FOREHEAD
LOCATION SIMPLE: LEFT CHEEK
LOCATION SIMPLE: RIGHT CHEEK
LOCATION SIMPLE: NOSE
LOCATION SIMPLE: RIGHT FOREHEAD
LOCATION SIMPLE: LEFT EYEBROW

## 2019-03-20 ENCOUNTER — TELEPHONE (OUTPATIENT)
Dept: MEDICAL GROUP | Facility: MEDICAL CENTER | Age: 69
End: 2019-03-20

## 2019-03-20 DIAGNOSIS — G89.29 CHRONIC PAIN OF RIGHT KNEE: ICD-10-CM

## 2019-03-20 DIAGNOSIS — M25.561 CHRONIC PAIN OF RIGHT KNEE: ICD-10-CM

## 2019-03-20 NOTE — TELEPHONE ENCOUNTER
Patient re-injured right knee and has appointment at [facility] to see Dr. Bhatt and needs referral.

## 2019-04-04 ENCOUNTER — TELEPHONE (OUTPATIENT)
Dept: MEDICAL GROUP | Facility: MEDICAL CENTER | Age: 69
End: 2019-04-04

## 2019-04-04 NOTE — TELEPHONE ENCOUNTER
Patient notified.  
Patient reports problems with neck. She has tried to get in to see Dr. Larsen but there will be a wait. Patient asking for tips to maintain neck pain in the meantime.  
She can try to take Tylenol 500 mg, 2 tablets at a time, up to 3 times a day.  She can also try topical treatments such as heat.  She can also try Aspercreme with lidocaine.  Joao Ireland M.D.    
- - -

## 2019-04-05 ENCOUNTER — HOSPITAL ENCOUNTER (EMERGENCY)
Facility: MEDICAL CENTER | Age: 69
End: 2019-04-05
Attending: EMERGENCY MEDICINE
Payer: MEDICARE

## 2019-04-05 VITALS
TEMPERATURE: 97.4 F | HEART RATE: 68 BPM | BODY MASS INDEX: 42.19 KG/M2 | WEIGHT: 229.28 LBS | HEIGHT: 62 IN | DIASTOLIC BLOOD PRESSURE: 71 MMHG | SYSTOLIC BLOOD PRESSURE: 128 MMHG | RESPIRATION RATE: 18 BRPM

## 2019-04-05 DIAGNOSIS — M54.2 NECK PAIN: ICD-10-CM

## 2019-04-05 PROCEDURE — 700102 HCHG RX REV CODE 250 W/ 637 OVERRIDE(OP): Performed by: EMERGENCY MEDICINE

## 2019-04-05 PROCEDURE — 99284 EMERGENCY DEPT VISIT MOD MDM: CPT

## 2019-04-05 PROCEDURE — 20552 NJX 1/MLT TRIGGER POINT 1/2: CPT

## 2019-04-05 PROCEDURE — A9270 NON-COVERED ITEM OR SERVICE: HCPCS | Performed by: EMERGENCY MEDICINE

## 2019-04-05 RX ORDER — LIDOCAINE 50 MG/G
1 PATCH TOPICAL EVERY 24 HOURS
Qty: 10 PATCH | Refills: 0 | Status: SHIPPED | OUTPATIENT
Start: 2019-04-05 | End: 2019-04-05

## 2019-04-05 RX ORDER — OXYCODONE HYDROCHLORIDE 5 MG/1
5 TABLET ORAL EVERY 6 HOURS PRN
Qty: 10 TAB | Refills: 0 | Status: SHIPPED | OUTPATIENT
Start: 2019-04-05 | End: 2019-04-08

## 2019-04-05 RX ORDER — OXYCODONE HYDROCHLORIDE 5 MG/1
5 TABLET ORAL ONCE
Status: COMPLETED | OUTPATIENT
Start: 2019-04-05 | End: 2019-04-05

## 2019-04-05 RX ORDER — LIDOCAINE 50 MG/G
1 PATCH TOPICAL EVERY 24 HOURS
Qty: 30 PATCH | Refills: 0 | Status: SHIPPED | OUTPATIENT
Start: 2019-04-05 | End: 2019-08-07

## 2019-04-05 RX ADMIN — OXYCODONE HYDROCHLORIDE 5 MG: 5 TABLET ORAL at 02:32

## 2019-04-05 RX ADMIN — OXYCODONE HYDROCHLORIDE 5 MG: 5 TABLET ORAL at 01:57

## 2019-04-05 NOTE — ED TRIAGE NOTES
Pt states that she has chronic DDD in her C-spine. Tuesday, it became worsened. Pt denies any acute injuries. Pain has been progressively worsening. She has an appointment next week though she states that she cannot wait that long. She states that she is in pain to the point that she cannot sleep. Pt alert and oriented x 4, ambulatory without assistance.

## 2019-04-05 NOTE — ED PROVIDER NOTES
ED Provider Note        History obtained from: Patient    CHIEF COMPLAINT  Chief Complaint   Patient presents with   • Neck Pain     since tuesday, recurrent issue       HPI  Prince Mazariegos is a 68 y.o. female who presents with neck pain.  Patient reports she is been having 2 days of gradually worsening neck pain.  She awoke 2 days ago in the morning with pain in the neck and a somewhat stiff neck which has progressively worsened.  She denies any paresthesias, loss of control of her bowel or bladder, focal weakness.  She does report an associated headache with the neck pain.  Denies any visual changes, nausea, vomiting, fevers, trauma.    REVIEW OF SYSTEMS    CONSTITUTIONAL:  No fever.  CARDIOVASCULAR:  No chest discomfort.  RESPIRATORY:  No pleuritic chest pain.  GASTROINTESTINAL:  No abdominal pain.    See HPI for further details.       PAST MEDICAL HISTORY  Past Medical History:   Diagnosis Date   • Anesthesia     PONV   • Arrhythmia    • Arthritis    • Back pain    • Chest pain May 2016    PET scan with no infarct or ischemia, normal LVEF.   • Chickenpox    • DDD (degenerative disc disease), cervical    • Enlarged tonsils     Removed with adenoids   • GERD (gastroesophageal reflux disease)    • [place] measles    • Hemorrhoids     bleeding   • High cholesterol    • Hypertension     Medicated   • Obesity    • Psychiatric disorder     Anxiety - medicated   • Skin cancer     skin       FAMILY HISTORY  Family History   Problem Relation Age of Onset   • Cancer Mother         Breast cancer   • Cancer Sister         Breast cancer   • Cancer Other    • Cancer Paternal Grandfather         liver   • Cancer Father         Pancreatic cancer   • Cancer Maternal Aunt    • Heart Disease Neg Hx        SOCIAL HISTORY   reports that she has never smoked. She has never used smokeless tobacco. She reports that she does not drink alcohol or use drugs.    SURGICAL HISTORY  Past Surgical History:   Procedure Laterality Date   •  CARPAL TUNNEL ENDOSCOPIC  10/28/2011    Performed by EDGAR GARCIA at SURGERY SAME DAY [facility] ORS   • CARPAL TUNNEL ENDOSCOPIC  10/17/2011    Performed by EDGAR GARCIA at SURGERY SAME DAY [facility] ORS   • HYSTEROSCOPY WITH VIDEO DIAGNOSTIC  10/7/08    Performed by MICHELINE WEBER at SURGERY SAME DAY [facility] ORS   • DILATION AND CURETTAGE  10/7/08    Performed by MICHELINE WEBER at SURGERY SAME DAY [facility] ORS   • LUMPECTOMY Right 2007    Breast lump removed   • CARPAL TUNNEL RELEASE     • OTHER      hemorrhoid banding   • OTHER CARDIAC SURGERY      Cath - 4-5 years ago with ba   • TONSILLECTOMY         CURRENT MEDICATIONS  Home Medications     Reviewed by Senthil Hernandes R.N. (Registered Nurse) on 04/05/19 at 0137  Med List Status: Partial   Medication Last Dose Status   albuterol 108 (90 BASE) MCG/ACT Aero Soln inhalation aerosol  Active   Calcium 500 MG Chew Tab  Active   calcium acetate (PHOS-LO) 667 MG Cap  Active   calcium carbonate (TUMS) 500 MG Chew Tab  Active   Cholecalciferol (VITAMIN D) 2000 UNIT Tab  Active   Fiber Powder  Active   hydroCHLOROthiazide (HYDRODIURIL) 25 MG Tab  Active   levothyroxine (SYNTHROID) 75 MCG Tab  Active   lisinopril (PRINIVIL, ZESTRIL) 40 MG tablet  Active   metoprolol SR (TOPROL XL) 50 MG TABLET SR 24 HR  Active   Multiple Vitamins-Minerals (MULTI-VITAMIN GUMMIES PO)  Active   multivitamin (THERAGRAN) Tab  Active   omeprazole (PRILOSEC) 20 MG delayed-release capsule  Active   polyethylene glycol 3350 (MIRALAX) Powder  Active   Probiotic Product (PROBIOTIC-10 PO)  Active   rosuvastatin (CRESTOR) 10 MG Tab  Active                ALLERGIES  Allergies   Allergen Reactions   • Aleve [Gnp Naproxen Sodium]    • Latex Rash   • Naproxen Sodium Hives   • Other Misc Swelling     Some soap & toothpaste   • Skelaxin [Metaxalone] Hives   • Sunscreens        PHYSICAL EXAM  VITAL SIGNS: /71   Pulse 68   Temp 36.3 °C (97.4 °F) (Temporal)   Resp 18    "Ht 1.575 m (5' 2\")   Wt 104 kg (229 lb 4.5 oz)   BMI 41.94 kg/m²    Gen: alert, no acute distress  HENT: ATNC, normal oropharynx  Eyes: normal conjuctiva neck: No spinous process tenderness or step-offs.  Limited range of motion of the neck secondary to neck.  Resp: No resipiratory distress.   CV: No JVD   Abd: Non-distended  Extremities: No deformity.  5/5  strength bilateral upper extremities.  Sensation intact through entire bilateral upper and lower extremities.          RADIOLOGY/PROCEDURES  Procedure: Trigger point injection  Indication: Musculoskeletal pain  The patient was placed in appropriate position.  The region of maximal tenderness was identified on either side of the base of the neck along the trapezius muscle.  The skin was cleansed with alcohol.  Approximately 4 mL of 0.5 bupivacaine with epinephrine was injected on either side.  EBL: 0 mL  The patient tolerated the procedure without any immediate complications    COURSE & MEDICAL DECISION MAKING  Pertinent Labs & Imaging studies reviewed. (See chart for details)    Patient presents with bilateral neck pain without radicular symptoms.  Low suspicion for meningitis, unstable spinal injury.  Patient has no history of trauma.  She does have a history of degenerative disks in her neck with similar symptoms in the remote past.  No evidence of spinal cord impact.  Normal strength and sensation throughout.  Will trial trigger point injection, provide opioid pain medications.  Patient is allergic to nonsteroidal anti-inflammatories and has been taking appropriate acetaminophen.    After initial medications and trigger point injection, patient had minimal relief.  Will provide additional oxycodone.    After second oxycodone, patient felt relief in pain, will provide short course of opioid pain medications for pain relief.    In prescribing controlled substances to this patient, I certify that I have obtained and reviewed the medical history of Prince" Mandy Mazariegos. I have also made a good niranjan effort to obtain applicable records from other providers who have treated the patient and no other records are available at this time.     I have conducted a physical exam and documented it. I have reviewed Ms. Mazariegos’s prescription history as maintained by the [place] Prescription Monitoring Program.     I have assessed the patient’s risk for abuse, dependency, and addiction using the validated Opioid Risk Tool available at https://www.mdcalc.com/mtdskb-rqsq-sagv-ort-narcotic-abuse.     Given the above, I believe the benefits of controlled substance therapy outweigh the risks. The reasons for prescribing controlled substances include non-narcotic, oral analgesic alternatives have been inadequate for pain control. Accordingly, I have discussed the risk and benefits, treatment plan, and alternative therapies with the patient.   The risks of the medication, including constipation, addiction and altered mental status were discussed with the patient and they expressed understanding. They were encouraged to use the minimum dose necessary to control their breakthrough pain.      FINAL IMPRESSION  1. Neck pain

## 2019-04-05 NOTE — DISCHARGE INSTRUCTIONS
You were seen in the emergency department for pain in your neck.  Your neurologic and physical exam were reassuring.  You were treated with a trigger point injection, as well as opioid pain medications.  Please continue to take your Tylenol.  You may take up to 1000 mg every 8 hours for a total daily maximum of 3000 mg.  If this is not adequately controlling her pain, you are also being prescribed a lidocaine patch, as well as opioid pain medications.    You are being sent home with an opioid pain medication. This medication causes sedation and you should not drive or operate machinery while taking it. You should not use alcohol or recreational drugs while taking this medication. Side effects of this medication can include itching, nausea, and constipation.    There is a risk of addiction to this medication and you should only take the smallest amount necessary to control your symptoms. You should use other non-opioid pain medications for your baseline pain and only use this medication if necessary.     There are many alternatives to pain medications, such as massage, acupuncture, and meditation. Check with your health insurance regarding their coverage of these complementary treatments.    You are being sent home with a prescription for a lidocaine patch.  This should be worn at the area of worst pain for 12 hours, then removed for 12 hours.  If the prescription costs are too high, please discussed with the pharmacist about over-the-counter strength formulations that may be more economical for you.    Please follow-up with your regular doctor.    Please return to the emergency department or seek medical attention if you develop:  Loss of sensation in any part of your body, difficulty controlling her bowel or bladder, severe progressive pain, any other new or concerning findings

## 2019-05-02 ENCOUNTER — HOSPITAL ENCOUNTER (OUTPATIENT)
Dept: RADIOLOGY | Facility: MEDICAL CENTER | Age: 69
End: 2019-05-02
Attending: NURSE PRACTITIONER
Payer: MEDICARE

## 2019-05-02 DIAGNOSIS — M25.511 RIGHT SHOULDER PAIN, UNSPECIFIED CHRONICITY: ICD-10-CM

## 2019-05-02 DIAGNOSIS — M47.812 CERVICAL SPONDYLOSIS WITHOUT MYELOPATHY: ICD-10-CM

## 2019-05-02 PROCEDURE — 73030 X-RAY EXAM OF SHOULDER: CPT | Mod: RT

## 2019-05-02 PROCEDURE — 72050 X-RAY EXAM NECK SPINE 4/5VWS: CPT

## 2019-05-14 ENCOUNTER — TELEPHONE (OUTPATIENT)
Dept: MEDICAL GROUP | Facility: MEDICAL CENTER | Age: 69
End: 2019-05-14

## 2019-05-14 ENCOUNTER — HOSPITAL ENCOUNTER (OUTPATIENT)
Dept: LAB | Facility: MEDICAL CENTER | Age: 69
End: 2019-05-14
Attending: FAMILY MEDICINE
Payer: MEDICARE

## 2019-05-14 DIAGNOSIS — E03.4 HYPOTHYROIDISM DUE TO ACQUIRED ATROPHY OF THYROID: ICD-10-CM

## 2019-05-14 DIAGNOSIS — E78.5 DYSLIPIDEMIA: ICD-10-CM

## 2019-05-14 DIAGNOSIS — I10 ESSENTIAL HYPERTENSION: ICD-10-CM

## 2019-05-14 LAB
ALBUMIN SERPL BCP-MCNC: 4.2 G/DL (ref 3.2–4.9)
ALBUMIN/GLOB SERPL: 1.6 G/DL
ALP SERPL-CCNC: 87 U/L (ref 30–99)
ALT SERPL-CCNC: 16 U/L (ref 2–50)
ANION GAP SERPL CALC-SCNC: 10 MMOL/L (ref 0–11.9)
AST SERPL-CCNC: 17 U/L (ref 12–45)
BASOPHILS # BLD AUTO: 0.7 % (ref 0–1.8)
BASOPHILS # BLD: 0.05 K/UL (ref 0–0.12)
BILIRUB SERPL-MCNC: 0.7 MG/DL (ref 0.1–1.5)
BUN SERPL-MCNC: 27 MG/DL (ref 8–22)
CALCIUM SERPL-MCNC: 9.9 MG/DL (ref 8.5–10.5)
CHLORIDE SERPL-SCNC: 100 MMOL/L (ref 96–112)
CHOLEST SERPL-MCNC: 138 MG/DL (ref 100–199)
CO2 SERPL-SCNC: 25 MMOL/L (ref 20–33)
CREAT SERPL-MCNC: 1.18 MG/DL (ref 0.5–1.4)
EOSINOPHIL # BLD AUTO: 0.16 K/UL (ref 0–0.51)
EOSINOPHIL NFR BLD: 2.2 % (ref 0–6.9)
ERYTHROCYTE [DISTWIDTH] IN BLOOD BY AUTOMATED COUNT: 43.9 FL (ref 35.9–50)
FASTING STATUS PATIENT QL REPORTED: NORMAL
GLOBULIN SER CALC-MCNC: 2.7 G/DL (ref 1.9–3.5)
GLUCOSE SERPL-MCNC: 93 MG/DL (ref 65–99)
HCT VFR BLD AUTO: 38.2 % (ref 37–47)
HDLC SERPL-MCNC: 58 MG/DL
HGB BLD-MCNC: 12.9 G/DL (ref 12–16)
IMM GRANULOCYTES # BLD AUTO: 0.02 K/UL (ref 0–0.11)
IMM GRANULOCYTES NFR BLD AUTO: 0.3 % (ref 0–0.9)
LDLC SERPL CALC-MCNC: 59 MG/DL
LYMPHOCYTES # BLD AUTO: 1.2 K/UL (ref 1–4.8)
LYMPHOCYTES NFR BLD: 16.6 % (ref 22–41)
MCH RBC QN AUTO: 30.9 PG (ref 27–33)
MCHC RBC AUTO-ENTMCNC: 33.8 G/DL (ref 33.6–35)
MCV RBC AUTO: 91.6 FL (ref 81.4–97.8)
MONOCYTES # BLD AUTO: 0.45 K/UL (ref 0–0.85)
MONOCYTES NFR BLD AUTO: 6.2 % (ref 0–13.4)
NEUTROPHILS # BLD AUTO: 5.35 K/UL (ref 2–7.15)
NEUTROPHILS NFR BLD: 74 % (ref 44–72)
NRBC # BLD AUTO: 0 K/UL
NRBC BLD-RTO: 0 /100 WBC
PLATELET # BLD AUTO: 273 K/UL (ref 164–446)
PMV BLD AUTO: 10.3 FL (ref 9–12.9)
POTASSIUM SERPL-SCNC: 4.1 MMOL/L (ref 3.6–5.5)
PROT SERPL-MCNC: 6.9 G/DL (ref 6–8.2)
RBC # BLD AUTO: 4.17 M/UL (ref 4.2–5.4)
SODIUM SERPL-SCNC: 135 MMOL/L (ref 135–145)
TRIGL SERPL-MCNC: 107 MG/DL (ref 0–149)
TSH SERPL DL<=0.005 MIU/L-ACNC: 1.49 UIU/ML (ref 0.38–5.33)
WBC # BLD AUTO: 7.2 K/UL (ref 4.8–10.8)

## 2019-05-14 PROCEDURE — 80061 LIPID PANEL: CPT

## 2019-05-14 PROCEDURE — 36415 COLL VENOUS BLD VENIPUNCTURE: CPT

## 2019-05-14 PROCEDURE — 85025 COMPLETE CBC W/AUTO DIFF WBC: CPT

## 2019-05-14 PROCEDURE — 80053 COMPREHEN METABOLIC PANEL: CPT

## 2019-05-14 PROCEDURE — 84443 ASSAY THYROID STIM HORMONE: CPT

## 2019-05-16 ENCOUNTER — SLEEP CENTER VISIT (OUTPATIENT)
Dept: SLEEP MEDICINE | Facility: MEDICAL CENTER | Age: 69
End: 2019-05-16
Payer: MEDICARE

## 2019-05-16 VITALS
RESPIRATION RATE: 16 BRPM | WEIGHT: 222 LBS | HEART RATE: 77 BPM | HEIGHT: 62 IN | OXYGEN SATURATION: 95 % | BODY MASS INDEX: 40.85 KG/M2 | SYSTOLIC BLOOD PRESSURE: 110 MMHG | DIASTOLIC BLOOD PRESSURE: 78 MMHG

## 2019-05-16 DIAGNOSIS — G47.33 OSA (OBSTRUCTIVE SLEEP APNEA): ICD-10-CM

## 2019-05-16 DIAGNOSIS — E66.01 MORBID OBESITY WITH BMI OF 40.0-44.9, ADULT (HCC): ICD-10-CM

## 2019-05-16 PROCEDURE — 99214 OFFICE O/P EST MOD 30 MIN: CPT | Performed by: NURSE PRACTITIONER

## 2019-05-16 RX ORDER — TIZANIDINE 2 MG/1
TABLET ORAL
Refills: 0 | COMMUNITY
Start: 2019-04-11 | End: 2019-08-07

## 2019-05-16 NOTE — PATIENT INSTRUCTIONS
1) Continue CPAP at [id number]  2) Clean mask and supplies weekly and change them as insurance allows   3) Rescue inhaler for dyspnea  4) Vaccines: Up to date with Pneumovax 23 and flu  5) Light conditioning encouraged  6) Return in about 1 year (around 5/16/2020) for follow up with JD Luke, if not sooner, review of symptoms, Compliance.

## 2019-05-16 NOTE — PROGRESS NOTES
CC:  Here for f/u sleep issues as listed below    HPI:   Prince presents today for follow up obstructive  apnea. PMH of HTN, anxiety. She is currently in PT for pain r/t neck, knee, shoulder.     HSS from June 2016 showed an AHI of 12.2 with low oxygenation of 76%.  Currently she is being treated with CPAP @ [id number]. Compliance download from the dates 4/16/2019 - 5/15/2019 indicates she is wearing the device 100% for an avg of 9 hours and 25 minutes per night with a reduced AHI of 0.7.  She does tolerate pressure and mask well.  She wakes up refreshed and denies morning H/A. she sleeps better overall and not not naping like she used to.  She will continue to clean supplies weekly and change them as insurance allows.       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 She has not used it, but happy to have it on hand.                       Patient Active Problem List    Diagnosis Date Noted   • HTN (hypertension) 11/11/2013     Priority: High   • Dyslipidemia 09/30/2013     Priority: High   • CKD (chronic kidney disease) stage 3, GFR 30-59 ml/min ([facility]) 09/16/2015     Priority: Medium   • Chronic lower back pain 09/16/2015     Priority: Medium   • Chronic pain of right knee 09/16/2015     Priority: Medium   • GERD (gastroesophageal reflux disease) 09/30/2013     Priority: Medium   • Hypothyroid 09/30/2013     Priority: Medium   • Right foot pain 06/04/2018   • Rosacea 05/18/2018   • GENIA (obstructive sleep apnea) 04/10/2017   • Morbid obesity with BMI of 40.0-44.9, adult ([facility]) 03/14/2017   • Cervical spine pain 06/02/2016   • Thoracic spine pain 06/02/2016       Past Medical History:   Diagnosis Date   • Anesthesia     PONV   • Arrhythmia    • Arthritis    • Back pain    • Chest pain May 2016    PET scan with no infarct or ischemia, normal LVEF.   • Chickenpox    • DDD (degenerative disc disease), cervical    • Enlarged tonsils      Removed with adenoids   • GERD (gastroesophageal reflux disease)    • [place] measles    • Hemorrhoids     bleeding   • High cholesterol    • Hypertension     Medicated   • Obesity    • Psychiatric disorder     Anxiety - medicated   • Skin cancer     skin       Past Surgical History:   Procedure Laterality Date   • CARPAL TUNNEL ENDOSCOPIC  10/28/2011    [facility] EDGAR GARC[facility]   • CARPAL TUNNEL ENDOSCOPIC  10/17/2011    Performed by EDGAR GARCIA at [facility]   • HYSTEROSCOPY WITH VIDEO DIAGNOSTIC  10/7/08    Performed by MICHELINE WEBER [facility]   • DILATION AND CURETTAGE  10/7/08    Performed by MICHELINE WEBER [facility]   • LUMPECTOMY Right 2007    Breast lump removed   • CARPAL TUNNEL RELEASE     • OTHER      hemorrhoid banding   • OTHER CARDIAC SURGERY      Cath - 4-5 years ago with ba   • TONSILLECTOMY         Family History   Problem Relation Age of Onset   • Cancer Mother         Breast cancer   • Cancer Sister         Breast cancer   • Cancer Other    • Cancer Paternal Grandfather         liver   • Cancer Father         Pancreatic cancer   • Cancer Maternal Aunt    • Heart Disease Neg Hx        Social History     Social History   • Marital status: Single     Spouse name: N/A   • Number of children: N/A   • Years of education: N/A     Occupational History   • Not on file.     Social History Main Topics   • Smoking status: Never Smoker   • Smokeless tobacco: Never Used   • Alcohol use No   • Drug use: No   • Sexual activity: Not on file     Other Topics Concern   • Not on file     Social History Narrative   • No narrative on file       Current Outpatient Prescriptions   Medication Sig Dispense Refill   • lidocaine (LIDODERM) 5 % Patch Apply 1 Patch to skin as directed every 24 hours. 30 Patch 0   • Cholecalciferol (VITAMIN D) 2000 UNIT Tab Take  by mouth every day.     •  "Probiotic Product (PROBIOTIC-10 PO) Take  by mouth.     • rosuvastatin (CRESTOR) 10 MG Tab Take 1 Tab by mouth every day. 90 Tab 3   • metoprolol SR (TOPROL XL) 50 MG TABLET SR 24 HR Take 1 Tab by mouth every day. 90 Tab 3   • lisinopril (PRINIVIL, ZESTRIL) 40 MG tablet Take 1 Tab by mouth every day. 90 Tab 3   • levothyroxine (SYNTHROID) 75 MCG Tab Take 1 Tab by mouth every day. 90 Tab 3   • hydroCHLOROthiazide (HYDRODIURIL) 25 MG Tab Take 1 Tab by mouth every day. 90 Tab 3   • polyethylene glycol 3350 (MIRALAX) Powder Take 17 g by mouth every day.     • calcium acetate (PHOS-LO) 667 MG Cap Take 1,334 mg by mouth 3 times a day, with meals.     • multivitamin (THERAGRAN) Tab Take 1 Tab by mouth every day.     • Fiber Powder Take  by mouth.     • Calcium 500 MG Chew Tab Take 1,000 mg by mouth every day.     • tizanidine (ZANAFLEX) 2 MG tablet   0   • omeprazole (PRILOSEC) 20 MG delayed-release capsule Take 20 mg by mouth every day.     • albuterol 108 (90 BASE) MCG/ACT Aero Soln inhalation aerosol Inhale 2 Puffs by mouth every 6 hours as needed for Shortness of Breath. 8.5 g 3   • calcium carbonate (TUMS) 500 MG Chew Tab Take 500 mg by mouth every day.       No current facility-administered medications for this visit.           Allergies: Aleve [gnp naproxen sodium]; Latex; Naproxen sodium; Other misc; Skelaxin [metaxalone]; and Sunscreens      ROS   Gen: Denies fever, chills, unintentional weight loss, fatigue  Resp:Denies Dyspnea  CV: Denies chest pain, chest tightness  Sleep:Denies morning headache, insomnia, daytime somnolence, snoring, gasping for air, apnea  Neuro: Denies frequent headaches, weakness, dizziness  See HPI.  All other systems reviewed and negative        Vital signs for this encounter:  Vitals:    05/16/19 1540   Height: 1.575 m (5' 2\")   Weight: 100.7 kg (222 lb)   Weight % change since last entry.: 0 %   BP: 110/78   Pulse: 77   BMI (Calculated): 40.6   Resp: 16                   Physical Exam: "   Gen:         Alert and oriented, No apparent distress.   Neck:        No Lymphadenopathy.  Lungs:     Clear to auscultation bilaterally.    CV:          Regular rate and rhythm. No murmurs, rubs or gallops.   Abd:         Soft non tender, non distended.            Ext:          No clubbing, cyanosis, edema.    Assessment   1. Morbid obesity with BMI of 40.0-44.9, adult ([facility])  OBESITY COUNSELING (No Charge): Patient identified as having weight management issue.  Appropriate orders and counseling given.   2. GENIA (obstructive sleep apnea)  DME Mask and Supplies       Patient is clinically stable and will proceed with following plan.     PLAN:   Patient Instructions   1) Continue CPAP at [id number]  2) Clean mask and supplies weekly and change them as insurance allows   3) Rescue inhaler for dyspnea  4) Vaccines: Up to date with Pneumovax 23 and flu  5) Light conditioning encouraged  6) Return in about 1 year (around 5/16/2020) for follow up with JD Luke, if not sooner, review of symptoms, Compliance.

## 2019-05-22 ENCOUNTER — OFFICE VISIT (OUTPATIENT)
Dept: MEDICAL GROUP | Facility: MEDICAL CENTER | Age: 69
End: 2019-05-22
Payer: MEDICARE

## 2019-05-22 VITALS
HEIGHT: 62 IN | DIASTOLIC BLOOD PRESSURE: 70 MMHG | OXYGEN SATURATION: 98 % | BODY MASS INDEX: 41.22 KG/M2 | TEMPERATURE: 97.9 F | HEART RATE: 70 BPM | SYSTOLIC BLOOD PRESSURE: 114 MMHG | WEIGHT: 224 LBS

## 2019-05-22 DIAGNOSIS — M54.50 CHRONIC MIDLINE LOW BACK PAIN WITHOUT SCIATICA: ICD-10-CM

## 2019-05-22 DIAGNOSIS — H00.014 HORDEOLUM EXTERNUM OF LEFT UPPER EYELID: ICD-10-CM

## 2019-05-22 DIAGNOSIS — N18.30 CKD (CHRONIC KIDNEY DISEASE) STAGE 3, GFR 30-59 ML/MIN (HCC): ICD-10-CM

## 2019-05-22 DIAGNOSIS — G47.33 OSA (OBSTRUCTIVE SLEEP APNEA): ICD-10-CM

## 2019-05-22 DIAGNOSIS — Z00.00 MEDICARE ANNUAL WELLNESS VISIT, SUBSEQUENT: ICD-10-CM

## 2019-05-22 DIAGNOSIS — E66.01 MORBID OBESITY WITH BMI OF 40.0-44.9, ADULT (HCC): ICD-10-CM

## 2019-05-22 DIAGNOSIS — M54.2 CERVICAL SPINE PAIN: ICD-10-CM

## 2019-05-22 DIAGNOSIS — E78.5 DYSLIPIDEMIA: ICD-10-CM

## 2019-05-22 DIAGNOSIS — I10 ESSENTIAL HYPERTENSION: ICD-10-CM

## 2019-05-22 DIAGNOSIS — M25.561 CHRONIC PAIN OF RIGHT KNEE: ICD-10-CM

## 2019-05-22 DIAGNOSIS — G89.29 CHRONIC PAIN OF RIGHT KNEE: ICD-10-CM

## 2019-05-22 DIAGNOSIS — G89.29 CHRONIC MIDLINE LOW BACK PAIN WITHOUT SCIATICA: ICD-10-CM

## 2019-05-22 DIAGNOSIS — E03.4 HYPOTHYROIDISM DUE TO ACQUIRED ATROPHY OF THYROID: ICD-10-CM

## 2019-05-22 PROCEDURE — G0439 PPPS, SUBSEQ VISIT: HCPCS | Performed by: FAMILY MEDICINE

## 2019-05-22 RX ORDER — ROSUVASTATIN CALCIUM 10 MG/1
10 TABLET, COATED ORAL
Qty: 90 TAB | Refills: 3 | Status: ON HOLD | OUTPATIENT
Start: 2019-05-22 | End: 2019-08-14

## 2019-05-22 RX ORDER — LEVOTHYROXINE SODIUM 0.07 MG/1
75 TABLET ORAL
Qty: 90 TAB | Refills: 3 | Status: SHIPPED | OUTPATIENT
Start: 2019-05-22 | End: 2020-05-29 | Stop reason: SDUPTHER

## 2019-05-22 RX ORDER — METOPROLOL SUCCINATE 50 MG/1
50 TABLET, EXTENDED RELEASE ORAL
Qty: 90 TAB | Refills: 3 | Status: SHIPPED | OUTPATIENT
Start: 2019-05-22 | End: 2020-05-29 | Stop reason: SDUPTHER

## 2019-05-22 RX ORDER — LISINOPRIL 40 MG/1
40 TABLET ORAL
Qty: 90 TAB | Refills: 3 | Status: SHIPPED | OUTPATIENT
Start: 2019-05-22 | End: 2020-05-29 | Stop reason: SDUPTHER

## 2019-05-22 RX ORDER — HYDROCHLOROTHIAZIDE 25 MG/1
25 TABLET ORAL
Qty: 90 TAB | Refills: 3 | Status: SHIPPED | OUTPATIENT
Start: 2019-05-22 | End: 2020-03-22

## 2019-05-22 ASSESSMENT — PATIENT HEALTH QUESTIONNAIRE - PHQ9: CLINICAL INTERPRETATION OF PHQ2 SCORE: 0

## 2019-05-22 ASSESSMENT — ENCOUNTER SYMPTOMS: GENERAL WELL-BEING: FAIR

## 2019-05-22 ASSESSMENT — ACTIVITIES OF DAILY LIVING (ADL): BATHING_REQUIRES_ASSISTANCE: 0

## 2019-05-22 NOTE — PROGRESS NOTES
Chief Complaint   Patient presents with   • Annual Exam   • Stye     left         HPI:  Prince Mazariegos is a 68 y.o. here for Medicare Annual Wellness Visit     Patient Active Problem List    Diagnosis Date Noted   • HTN (hypertension) 11/11/2013     Priority: High   • Dyslipidemia 09/30/2013     Priority: High   • CKD (chronic kidney disease) stage 3, GFR 30-59 ml/min ([facility]) 09/16/2015     Priority: Medium   • Chronic lower back pain 09/16/2015     Priority: Medium   • Chronic pain of right knee 09/16/2015     Priority: Medium   • GERD (gastroesophageal reflux disease) 09/30/2013     Priority: Medium   • Hypothyroid 09/30/2013     Priority: Medium   • Right foot pain 06/04/2018   • Rosacea 05/18/2018   • GENIA (obstructive sleep apnea) 04/10/2017   • Morbid obesity with BMI of 40.0-44.9, adult ([facility]) 03/14/2017   • Cervical spine pain 06/02/2016   • Thoracic spine pain 06/02/2016       Current Outpatient Prescriptions   Medication Sig Dispense Refill   • Cholecalciferol (VITAMIN D) 2000 UNIT Tab Take  by mouth every day.     • Probiotic Product (PROBIOTIC-10 PO) Take  by mouth.     • rosuvastatin (CRESTOR) 10 MG Tab Take 1 Tab by mouth every day. 90 Tab 3   • metoprolol SR (TOPROL XL) 50 MG TABLET SR 24 HR Take 1 Tab by mouth every day. 90 Tab 3   • lisinopril (PRINIVIL, ZESTRIL) 40 MG tablet Take 1 Tab by mouth every day. 90 Tab 3   • levothyroxine (SYNTHROID) 75 MCG Tab Take 1 Tab by mouth every day. 90 Tab 3   • hydroCHLOROthiazide (HYDRODIURIL) 25 MG Tab Take 1 Tab by mouth every day. 90 Tab 3   • polyethylene glycol 3350 (MIRALAX) Powder Take 17 g by mouth every day.     • calcium acetate (PHOS-LO) 667 MG Cap Take 1,334 mg by mouth 3 times a day, with meals.     • multivitamin (THERAGRAN) Tab Take 1 Tab by mouth every day.     • Fiber Powder Take  by mouth.     • Calcium 500 MG Chew Tab Take 1,000 mg by mouth every day.     • tizanidine (ZANAFLEX) 2 MG tablet   0   • lidocaine (LIDODERM) 5 % Patch Apply 1  Patch to skin as directed every 24 hours. 30 Patch 0   • omeprazole (PRILOSEC) 20 MG delayed-release capsule Take 20 mg by mouth every day.     • albuterol 108 (90 BASE) MCG/ACT Aero Soln inhalation aerosol Inhale 2 Puffs by mouth every 6 hours as needed for Shortness of Breath. 8.5 g 3   • calcium carbonate (TUMS) 500 MG Chew Tab Take 500 mg by mouth every day.       No current facility-administered medications for this visit.             Current supplements as per medication list.       Allergies: Aleve [gnp naproxen sodium]; Latex; Naproxen sodium; Other misc; Skelaxin [metaxalone]; and Sunscreens    Current social contact/activities: lives with brother. No children.    She  reports that she has never smoked. She has never used smokeless tobacco. She reports that she does not drink alcohol or use drugs.  Counseling given: Not Answered      DPA/Advanced Directive:  Patient does not have an Advanced Directive.  A packet and workshop information was given on Advanced Directives.    ROS:    Gait: Uses no assistive device  Ostomy: No  Other tubes: No  Amputations: No  Chronic oxygen use: No  Last eye exam: within last 1 year.  Wears hearing aids: No   : Reports urinary leakage during the last 6 months that has not interfered at all with their daily activities or sleep.      Depression Screening    Little interest or pleasure in doing things?  0 - not at all  Feeling down, depressed , or hopeless? 0 - not at all  Patient Health Questionnaire Score: 0     If depressive symptoms identified deferred to follow up visit unless specifically addressed in assessment and plan.    Interpretation of PHQ-9 Total Score   Score Severity   1-4 No Depression   5-9 Mild Depression   10-14 Moderate Depression   15-19 Moderately Severe Depression   20-27 Severe Depression    Screening for Cognitive Impairment    Three Minute Recall (village, kitchen, baby) 3/3    Paul clock face with all 12 numbers and set the hands to show 10 past 10.   Yes    Cognitive concerns identified deferred for follow up unless specifically addressed in assessment and plan.    Fall Risk Assessment    Has the patient had two or more falls in the last year or any fall with injury in the last year?  No    Safety Assessment    Throw rugs on floor.  Yes  Handrails on all stairs.  Yes  Good lighting in all hallways.  Yes  Difficulty hearing.  No  Patient counseled about all safety risks that were identified.    Functional Assessment ADLs    Are there any barriers preventing you from cooking for yourself or meeting nutritional needs?  No.    Are there any barriers preventing you from driving safely or obtaining transportation?  No.    Are there any barriers preventing you from using a telephone or calling for help?  No.    Are there any barriers preventing you from shopping?  No.    Are there any barriers preventing you from taking care of your own finances?  No.    Are there any barriers preventing you from managing your medications?  No.    Are there any barriers preventing you from showering, bathing or dressing yourself?  No.    Are you currently engaging in any exercise or physical activity?  Yes.     What is your perception of your health?  Fair.      Health Maintenance Summary                IMM ZOSTER VACCINES Overdue 1/6/2014      Done 11/11/2013 Imm Admin: Zoster Vaccine Live (ZVL) (Zostavax)    Annual Wellness Visit Overdue 5/19/2019      Done 5/18/2018 Visit Dx: Medicare annual wellness visit, initial    IMM PNEUMOCOCCAL 65+ (ADULT) LOW/MEDIUM RISK SERIES Next Due 10/10/2019      Done 10/10/2018 Imm Admin: Pneumococcal polysaccharide vaccine (PPSV-23)     Patient has more history with this topic...    MAMMOGRAM Next Due 10/30/2019      Done 10/30/2018 MA-SCREENING MAMMO BILAT W/TOMOSYNTHESIS W/CAD     Patient has more history with this topic...    BONE DENSITY Next Due 12/4/2020      Done 12/4/2015 DS-BONE DENSITY STUDY (DEXA)     Patient has more history with this  topic...    IMM DTaP/Tdap/Td Vaccine Next Due 3/14/2027      Done 3/14/2017 Imm Admin: Tdap Vaccine     Patient has more history with this topic...    COLONOSCOPY Next Due 5/7/2029      Done 5/7/2019 REFERRAL TO GI FOR COLONOSCOPY     Patient has more history with this topic...          Patient Care Team:  Joao Ireland M.D. as PCP - General (Family Medicine)  Micheline Weber as Consulting Physician (Gynecology)  [facility] as Respiratory (DME Supplier)  JD Crandall (Dermatology)  Angelina DALE MD (Gastroenterology)  Karli Rodriguez (Dentistry)  Edward Reeves OD (Ophthalmology)  Dr. Juan Antonio Larsen MD (Neurosurgery)        Social History   Substance Use Topics   • Smoking status: Never Smoker   • Smokeless tobacco: Never Used   • Alcohol use No     Family History   Problem Relation Age of Onset   • Cancer Mother         Breast cancer   • Cancer Sister         Breast cancer   • Cancer Other    • Cancer Paternal Grandfather         liver   • Cancer Father         Pancreatic cancer   • Cancer Maternal Aunt    • Heart Disease Neg Hx      She  has a past medical history of Anesthesia; Arrhythmia; Arthritis; Back pain; Chest pain (May 2016); Chickenpox; DDD (degenerative disc disease), cervical; Enlarged tonsils; GERD (gastroesophageal reflux disease); American measles; Hemorrhoids; High cholesterol; Hypertension; Obesity; Psychiatric disorder; and Skin cancer.   Past Surgical History:   Procedure Laterality Date   • CARPAL TUNNEL ENDOSCOPIC  10/28/2011    Performed by EDGAR GARCIA at SURGERY SAME DAY [facility] ORS   • CARPAL TUNNEL ENDOSCOPIC  10/17/2011    Performed by EDGAR GARCIA at SURGERY SAME DAY [facility] ORS   • HYSTEROSCOPY WITH VIDEO DIAGNOSTIC  10/7/08    Performed by MICHELINE WEBER at SURGERY SAME DAY [facility] ORS   • DILATION AND CURETTAGE  10/7/08    Performed by MICHELINE WEBER at SURGERY SAME DAY [facility] ORS   • LUMPECTOMY Right 2007    Breast lump  "removed   • CARPAL TUNNEL RELEASE     • OTHER      hemorrhoid banding   • OTHER CARDIAC SURGERY      Cath - 4-5 years ago with ba   • TONSILLECTOMY         Exam:   /70 (BP Location: Right arm, Patient Position: Sitting)   Pulse 70   Temp 36.6 °C (97.9 °F)   Ht 1.575 m (5' 2\")   Wt 101.6 kg (224 lb)   SpO2 98%  Body mass index is 40.97 kg/m².    Constitutional: Alert, no distress.  Skin: Warm, dry, good turgor, no rashes in visible areas.  Eye: Equal, round and reactive, conjunctiva clear. Left mid upper eyelid with mild circular erythema.  ENMT: Lips without lesions, good dentition, oropharynx clear.  Respiratory: Unlabored respiratory effort, lungs clear to auscultation, no wheezes, no ronchi.  Cardiovascular: Normal S1, S2, no murmur, no edema.  Psych: Alert and oriented x3, normal affect and mood.      Assessment and Plan. The following treatment and monitoring plan is recommended:    1. Medicare annual wellness visit, subsequent  - Subsequent Annual Wellness Visit - Includes PPPS ()    2. CKD (chronic kidney disease) stage 3, GFR 30-59 ml/min ([facility])  Stable. Continue to monitor labs annually.  - Subsequent Annual Wellness Visit - Includes PPPS ()    3. Morbid obesity with BMI of 40.0-44.9, adult ([facility])  Advised diet and exercise.  - Subsequent Annual Wellness Visit - Includes PPPS ()    4. Hordeolum externum of left upper eyelid  Advised warm compresses.  - Subsequent Annual Wellness Visit - Includes PPPS ()    5. Dyslipidemia  Controlled. Continue Crestor.  - rosuvastatin (CRESTOR) 10 MG Tab; Take 1 Tab by mouth every day.  Dispense: 90 Tab; Refill: 3  - Subsequent Annual Wellness Visit - Includes PPPS ()    6. Essential hypertension  Controlled. Continue lisinopril, HCTZ, metoprolol.  - lisinopril (PRINIVIL, ZESTRIL) 40 MG tablet; Take 1 Tab by mouth every day.  Dispense: 90 Tab; Refill: 3  - hydroCHLOROthiazide (HYDRODIURIL) 25 MG Tab; Take 1 Tab by mouth every day.  " Dispense: 90 Tab; Refill: 3  - metoprolol SR (TOPROL XL) 50 MG TABLET SR 24 HR; Take 1 Tab by mouth every day.  Dispense: 90 Tab; Refill: 3  - Subsequent Annual Wellness Visit - Includes PPPS ()    7. Hypothyroidism due to acquired atrophy of thyroid  Controlled. Continue levothyroxine.  - levothyroxine (SYNTHROID) 75 MCG Tab; Take 1 Tab by mouth every day.  Dispense: 90 Tab; Refill: 3  - Subsequent Annual Wellness Visit - Includes PPPS ()    8. Chronic pain of right knee  Advised patient to continue monitoring activity and PT.  - Subsequent Annual Wellness Visit - Includes PPPS ()    9. Cervical spine pain  Advised patient to continue monitoring activity and PT.  - Subsequent Annual Wellness Visit - Includes PPPS ()    10. Chronic midline low back pain without sciatica  Advised patient to continue monitoring activity and PT.  - Subsequent Annual Wellness Visit - Includes PPPS ()    11. GENIA (obstructive sleep apnea)  Continue with CPAP.  - Subsequent Annual Wellness Visit - Includes PPPS ()          Services suggested: No services needed at this time  Health Care Screening: Age-appropriate preventive services recommended by USPTF and ACIP covered by Medicare were discussed today. Services ordered if indicated and agreed upon by the patient.  Referrals offered: Community-based lifestyle interventions to reduce health risks and promote self-management and wellness, fall prevention, nutrition, physical activity, tobacco-use cessation, weight loss, and mental health services as per orders if indicated.    Discussion today about general wellness and lifestyle habits:    · Prevent falls and reduce trip hazards; Cautioned about securing or removing rugs.  · Have a working fire alarm and carbon monoxide detector;   · Engage in regular physical activity and social activities     Follow-up: Return in about 1 year (around 5/22/2020) for Annual.

## 2019-05-28 ENCOUNTER — APPOINTMENT (RX ONLY)
Dept: URBAN - METROPOLITAN AREA CLINIC 20 | Facility: CLINIC | Age: 69
Setting detail: DERMATOLOGY
End: 2019-05-28

## 2019-05-28 DIAGNOSIS — L57.0 ACTINIC KERATOSIS: ICD-10-CM | Status: INADEQUATELY CONTROLLED

## 2019-05-28 DIAGNOSIS — L57.8 OTHER SKIN CHANGES DUE TO CHRONIC EXPOSURE TO NONIONIZING RADIATION: ICD-10-CM

## 2019-05-28 DIAGNOSIS — L71.8 OTHER ROSACEA: ICD-10-CM | Status: IMPROVED

## 2019-05-28 PROCEDURE — ? ADDITIONAL NOTES

## 2019-05-28 PROCEDURE — ? COUNSELING

## 2019-05-28 PROCEDURE — 99214 OFFICE O/P EST MOD 30 MIN: CPT

## 2019-05-28 PROCEDURE — ? PRESCRIPTION

## 2019-05-28 RX ORDER — IMIQUIMOD 50 MG/G
CREAM TOPICAL
Qty: 1 | Refills: 2 | Status: ERX | COMMUNITY
Start: 2019-05-28

## 2019-05-28 RX ADMIN — IMIQUIMOD: 50 CREAM TOPICAL at 00:00

## 2019-05-28 ASSESSMENT — LOCATION SIMPLE DESCRIPTION DERM
LOCATION SIMPLE: RIGHT FOREHEAD
LOCATION SIMPLE: LEFT CHEEK
LOCATION SIMPLE: RIGHT CHEEK
LOCATION SIMPLE: NOSE
LOCATION SIMPLE: LEFT FOREHEAD

## 2019-05-28 ASSESSMENT — LOCATION DETAILED DESCRIPTION DERM
LOCATION DETAILED: RIGHT MEDIAL FOREHEAD
LOCATION DETAILED: LEFT INFERIOR MEDIAL FOREHEAD
LOCATION DETAILED: LEFT CENTRAL MALAR CHEEK
LOCATION DETAILED: RIGHT CENTRAL MALAR CHEEK
LOCATION DETAILED: NASAL DORSUM

## 2019-05-28 ASSESSMENT — LOCATION ZONE DERM
LOCATION ZONE: FACE
LOCATION ZONE: NOSE

## 2019-05-28 NOTE — PROCEDURE: ADDITIONAL NOTES
Detail Level: Simple
Additional Notes: Patient instructed to treat right and left eyebrows with imq\\n5/28/19 Start\\nApply twice a day for 2 weeks\\n6/11/19 Stop\\nPatient to RTC in 1 months following treatment
Additional Notes: Continue metrogel

## 2019-05-31 RX ORDER — IMIQUIMOD 50 MG/G
CREAM TOPICAL
Qty: 1 | Refills: 2 | Status: ERX

## 2019-06-03 ENCOUNTER — OFFICE VISIT (OUTPATIENT)
Dept: URGENT CARE | Facility: CLINIC | Age: 69
End: 2019-06-03
Payer: MEDICARE

## 2019-06-03 VITALS
BODY MASS INDEX: 41.44 KG/M2 | DIASTOLIC BLOOD PRESSURE: 52 MMHG | WEIGHT: 225.2 LBS | SYSTOLIC BLOOD PRESSURE: 112 MMHG | RESPIRATION RATE: 12 BRPM | TEMPERATURE: 98.1 F | HEART RATE: 89 BPM | OXYGEN SATURATION: 93 % | HEIGHT: 62 IN

## 2019-06-03 DIAGNOSIS — J06.9 UPPER RESPIRATORY TRACT INFECTION, UNSPECIFIED TYPE: ICD-10-CM

## 2019-06-03 LAB
INT CON NEG: NEGATIVE
INT CON POS: POSITIVE
S PYO AG THROAT QL: NEGATIVE

## 2019-06-03 PROCEDURE — 87880 STREP A ASSAY W/OPTIC: CPT | Performed by: PHYSICIAN ASSISTANT

## 2019-06-03 PROCEDURE — 99214 OFFICE O/P EST MOD 30 MIN: CPT | Performed by: PHYSICIAN ASSISTANT

## 2019-06-03 RX ORDER — METHYLPREDNISOLONE 4 MG/1
TABLET ORAL
Qty: 21 TAB | Refills: 0 | Status: SHIPPED | OUTPATIENT
Start: 2019-06-03 | End: 2019-06-03 | Stop reason: SDUPTHER

## 2019-06-03 RX ORDER — METHYLPREDNISOLONE 4 MG/1
TABLET ORAL
Qty: 21 TAB | Refills: 0 | Status: SHIPPED | OUTPATIENT
Start: 2019-06-03 | End: 2019-08-07

## 2019-06-03 ASSESSMENT — ENCOUNTER SYMPTOMS
MYALGIAS: 0
CHILLS: 0
PALPITATIONS: 0
COUGH: 1
SENSORY CHANGE: 0
SHORTNESS OF BREATH: 1
ABDOMINAL PAIN: 0
SPUTUM PRODUCTION: 1
SORE THROAT: 0
WHEEZING: 1
HEADACHES: 0
FEVER: 0
RHINORRHEA: 1
VOMITING: 0
TINGLING: 0
FOCAL WEAKNESS: 0
NAUSEA: 0
DIARRHEA: 0
HEMOPTYSIS: 0

## 2019-06-03 NOTE — PROGRESS NOTES
Subjective:      Prince Mazariegos is a 69 y.o. female who presents with Pharyngitis (X 8 day, coughing, runny nose sore throat )            Cough   This is a new problem. Episode onset: 8 days. The problem has been unchanged. The cough is productive of sputum. Associated symptoms include nasal congestion, rhinorrhea, shortness of breath and wheezing. Pertinent negatives include no chest pain, chills, ear congestion, ear pain, fever, headaches, hemoptysis, myalgias, rash or sore throat. She has tried a beta-agonist inhaler for the symptoms. Her past medical history is significant for bronchitis. There is no history of asthma or pneumonia.       Past Medical History:   Diagnosis Date   • Anesthesia     PONV   • Arrhythmia    • Arthritis    • Back pain    • Chest pain May 2016    PET scan with no infarct or ischemia, normal LVEF.   • Chickenpox    • DDD (degenerative disc disease), cervical    • Enlarged tonsils     Removed with adenoids   • GERD (gastroesophageal reflux disease)    • Yoruba measles    • Hemorrhoids     bleeding   • High cholesterol    • Hypertension     Medicated   • Obesity    • Psychiatric disorder     Anxiety - medicated   • Skin cancer     skin       Past Surgical History:   Procedure Laterality Date   • CARPAL TUNNEL ENDOSCOPIC  10/28/2011    Performed by EDGAR GARCIA at SURGERY SAME DAY [facility] ORS   • CARPAL TUNNEL ENDOSCOPIC  10/17/2011    Performed by EDGAR GARCIA at SURGERY SAME DAY [facility] ORS   • HYSTEROSCOPY WITH VIDEO DIAGNOSTIC  10/7/08    Performed by MICHELINE WEBER at SURGERY SAME DAY [facility] ORS   • DILATION AND CURETTAGE  10/7/08    Performed by MICHELINE WEBER at SURGERY SAME DAY [facility] ORS   • LUMPECTOMY Right 2007    Breast lump removed   • CARPAL TUNNEL RELEASE     • OTHER      hemorrhoid banding   • OTHER CARDIAC SURGERY      Cath - 4-5 years ago with ba   • TONSILLECTOMY         Family History   Problem Relation Age of Onset   • Cancer  "Mother         Breast cancer   • Cancer Sister         Breast cancer   • Cancer Other    • Cancer Paternal Grandfather         liver   • Cancer Father         Pancreatic cancer   • Cancer Maternal Aunt    • Heart Disease Neg Hx        Allergies   Allergen Reactions   • Aleve [Gnp Naproxen Sodium]    • Latex Rash   • Naproxen Sodium Hives   • Other Misc Swelling     Some soap & toothpaste   • Skelaxin [Metaxalone] Hives   • Sunscreens        Medications, Allergies, and current problem list reviewed today in Epic    Review of Systems   Constitutional: Negative for chills, fever and malaise/fatigue.   HENT: Positive for congestion and rhinorrhea. Negative for ear discharge, ear pain and sore throat.    Respiratory: Positive for cough, sputum production (clear), shortness of breath and wheezing. Negative for hemoptysis.    Cardiovascular: Negative for chest pain, palpitations and leg swelling.   Gastrointestinal: Negative for abdominal pain, diarrhea, nausea and vomiting.   Musculoskeletal: Negative for myalgias.   Skin: Negative for rash.   Neurological: Negative for tingling, sensory change, focal weakness and headaches.     All other systems reviewed and are negative.        Objective:     /52 (BP Location: Left arm, Patient Position: Sitting, BP Cuff Size: Adult)   Pulse 89   Temp 36.7 °C (98.1 °F) (Temporal)   Resp 12   Ht 1.575 m (5' 2\")   Wt 102.2 kg (225 lb 3.2 oz)   SpO2 93%   BMI 41.19 kg/m²      Physical Exam   Constitutional: She is oriented to person, place, and time. She appears well-developed and well-nourished. No distress.   HENT:   Head: Normocephalic and atraumatic.   Right Ear: Tympanic membrane, external ear and ear canal normal.   Left Ear: Tympanic membrane, external ear and ear canal normal.   Nose: Mucosal edema and rhinorrhea present.   Mouth/Throat: Uvula is midline, oropharynx is clear and moist and mucous membranes are normal.   Eyes: Conjunctivae are normal.   Neck: Neck " supple.   Cardiovascular: Normal rate, regular rhythm and normal heart sounds.  Exam reveals no gallop and no friction rub.    No murmur heard.  Pulmonary/Chest: Effort normal. No respiratory distress. She has no decreased breath sounds. She has wheezes (mild expiratory wheezes ). She has no rhonchi. She has no rales.   Lymphadenopathy:     She has no cervical adenopathy.   Neurological: She is alert and oriented to person, place, and time. No cranial nerve deficit.   Skin: Skin is warm and dry. No rash noted.   Psychiatric: She has a normal mood and affect. Her behavior is normal. Judgment and thought content normal.               Assessment/Plan:     1. Upper respiratory tract infection, unspecified type  MethylPREDNISolone (MEDROL DOSEPAK) 4 MG Tablet Therapy Pack           Viral etiology discussed. Advised conservative treatment.  Will treat with Medrol for wheezing.  Encouraged fluids, rest, humidification.     Differential diagnoses, Supportive care, and indications for immediate follow-up discussed with patient.   Instructed to return to clinic or nearest emergency department for any change in condition, further concerns, or worsening of symptoms.      The patient demonstrated a good understanding and agreed with the treatment plan.    aFng Nieto P.A.-C.

## 2019-07-09 ENCOUNTER — APPOINTMENT (RX ONLY)
Dept: URBAN - METROPOLITAN AREA CLINIC 20 | Facility: CLINIC | Age: 69
Setting detail: DERMATOLOGY
End: 2019-07-09

## 2019-07-09 DIAGNOSIS — H00.01 HORDEOLUM EXTERNUM: ICD-10-CM

## 2019-07-09 DIAGNOSIS — L57.0 ACTINIC KERATOSIS: ICD-10-CM | Status: STABLE

## 2019-07-09 PROBLEM — H00.014 HORDEOLUM EXTERNUM LEFT UPPER EYELID: Status: ACTIVE | Noted: 2019-07-09

## 2019-07-09 PROCEDURE — 99214 OFFICE O/P EST MOD 30 MIN: CPT

## 2019-07-09 PROCEDURE — ? COUNSELING

## 2019-07-09 PROCEDURE — ? PRESCRIPTION

## 2019-07-09 RX ORDER — ERYTHROMYCIN 5 MG/G
OINTMENT OPHTHALMIC BID
Qty: 1 | Refills: 0 | Status: ERX | COMMUNITY
Start: 2019-07-09

## 2019-07-09 RX ADMIN — ERYTHROMYCIN: 5 OINTMENT OPHTHALMIC at 00:00

## 2019-07-09 ASSESSMENT — LOCATION ZONE DERM
LOCATION ZONE: EYELID
LOCATION ZONE: FACE

## 2019-07-09 ASSESSMENT — LOCATION SIMPLE DESCRIPTION DERM
LOCATION SIMPLE: LEFT LATERAL SUPERIOR TARSAL REGION
LOCATION SIMPLE: RIGHT EYEBROW
LOCATION SIMPLE: LEFT EYEBROW

## 2019-07-09 ASSESSMENT — LOCATION DETAILED DESCRIPTION DERM
LOCATION DETAILED: LEFT LATERAL EYEBROW
LOCATION DETAILED: RIGHT CENTRAL EYEBROW
LOCATION DETAILED: LEFT LATERAL SUPERIOR TARSAL REGION

## 2019-07-18 ENCOUNTER — TELEPHONE (OUTPATIENT)
Dept: MEDICAL GROUP | Facility: MEDICAL CENTER | Age: 69
End: 2019-07-18

## 2019-07-18 DIAGNOSIS — H00.014 HORDEOLUM EXTERNUM OF LEFT UPPER EYELID: ICD-10-CM

## 2019-07-18 NOTE — TELEPHONE ENCOUNTER
VOICEMAIL  1. Caller Name: Prince                      Call Back Number: [phone]    2. Message: Patient was seen in May for an eye stye. Patient reports it is still present. She is requesting a referral to see an Opthalmologist.  Patient would like to see Dr. Fang at [facility].    3. Patient approves office to leave a detailed voicemail/MyChart message: N\A

## 2019-07-23 ENCOUNTER — HOSPITAL ENCOUNTER (OUTPATIENT)
Dept: RADIOLOGY | Facility: MEDICAL CENTER | Age: 69
End: 2019-07-23
Attending: SURGERY
Payer: MEDICARE

## 2019-07-23 DIAGNOSIS — M25.511 RIGHT SHOULDER PAIN, UNSPECIFIED CHRONICITY: ICD-10-CM

## 2019-07-23 PROCEDURE — 73221 MRI JOINT UPR EXTREM W/O DYE: CPT | Mod: RT

## 2019-08-07 DIAGNOSIS — Z01.812 PRE-OPERATIVE LABORATORY EXAMINATION: ICD-10-CM

## 2019-08-07 DIAGNOSIS — Z01.810 PRE-OPERATIVE CARDIOVASCULAR EXAMINATION: ICD-10-CM

## 2019-08-07 LAB — EKG IMPRESSION: NORMAL

## 2019-08-07 PROCEDURE — 93005 ELECTROCARDIOGRAM TRACING: CPT | Performed by: SURGERY

## 2019-08-07 RX ORDER — ACETAMINOPHEN 325 MG/1
2 TABLET ORAL EVERY 8 HOURS PRN
COMMUNITY
End: 2022-03-04

## 2019-08-14 ENCOUNTER — ANESTHESIA EVENT (OUTPATIENT)
Dept: SURGERY | Facility: MEDICAL CENTER | Age: 69
DRG: 483 | End: 2019-08-14
Payer: MEDICARE

## 2019-08-14 ENCOUNTER — ANESTHESIA (OUTPATIENT)
Dept: SURGERY | Facility: MEDICAL CENTER | Age: 69
DRG: 483 | End: 2019-08-14
Payer: MEDICARE

## 2019-08-14 ENCOUNTER — APPOINTMENT (OUTPATIENT)
Dept: RADIOLOGY | Facility: MEDICAL CENTER | Age: 69
DRG: 483 | End: 2019-08-14
Attending: SURGERY
Payer: MEDICARE

## 2019-08-14 ENCOUNTER — HOSPITAL ENCOUNTER (INPATIENT)
Facility: MEDICAL CENTER | Age: 69
LOS: 1 days | DRG: 483 | End: 2019-08-15
Attending: SURGERY | Admitting: SURGERY
Payer: MEDICARE

## 2019-08-14 DIAGNOSIS — M19.011 ARTHRITIS OF RIGHT SHOULDER REGION: ICD-10-CM

## 2019-08-14 LAB
ANION GAP SERPL CALC-SCNC: 14 MMOL/L (ref 0–11.9)
BUN SERPL-MCNC: 21 MG/DL (ref 8–22)
CALCIUM SERPL-MCNC: 10 MG/DL (ref 8.5–10.5)
CHLORIDE SERPL-SCNC: 103 MMOL/L (ref 96–112)
CO2 SERPL-SCNC: 20 MMOL/L (ref 20–33)
CREAT SERPL-MCNC: 1.32 MG/DL (ref 0.5–1.4)
ERYTHROCYTE [DISTWIDTH] IN BLOOD BY AUTOMATED COUNT: 43.9 FL (ref 35.9–50)
GLUCOSE SERPL-MCNC: 104 MG/DL (ref 65–99)
HCT VFR BLD AUTO: 38.4 % (ref 37–47)
HGB BLD-MCNC: 12.3 G/DL (ref 12–16)
MCH RBC QN AUTO: 29.9 PG (ref 27–33)
MCHC RBC AUTO-ENTMCNC: 32 G/DL (ref 33.6–35)
MCV RBC AUTO: 93.2 FL (ref 81.4–97.8)
PLATELET # BLD AUTO: 266 K/UL (ref 164–446)
PMV BLD AUTO: 9.7 FL (ref 9–12.9)
POTASSIUM SERPL-SCNC: 4.3 MMOL/L (ref 3.6–5.5)
RBC # BLD AUTO: 4.12 M/UL (ref 4.2–5.4)
SODIUM SERPL-SCNC: 137 MMOL/L (ref 135–145)
WBC # BLD AUTO: 8.4 K/UL (ref 4.8–10.8)

## 2019-08-14 PROCEDURE — 700102 HCHG RX REV CODE 250 W/ 637 OVERRIDE(OP): Performed by: ANESTHESIOLOGY

## 2019-08-14 PROCEDURE — 700111 HCHG RX REV CODE 636 W/ 250 OVERRIDE (IP): Performed by: ANESTHESIOLOGY

## 2019-08-14 PROCEDURE — 501445 HCHG STAPLER, SKIN DISP: Performed by: SURGERY

## 2019-08-14 PROCEDURE — A9270 NON-COVERED ITEM OR SERVICE: HCPCS | Performed by: ANESTHESIOLOGY

## 2019-08-14 PROCEDURE — 501838 HCHG SUTURE GENERAL: Performed by: SURGERY

## 2019-08-14 PROCEDURE — 160029 HCHG SURGERY MINUTES - 1ST 30 MINS LEVEL 4: Performed by: SURGERY

## 2019-08-14 PROCEDURE — 502578 HCHG PACK, TOTAL HIP: Performed by: SURGERY

## 2019-08-14 PROCEDURE — 160009 HCHG ANES TIME/MIN: Performed by: SURGERY

## 2019-08-14 PROCEDURE — 700102 HCHG RX REV CODE 250 W/ 637 OVERRIDE(OP): Performed by: SURGERY

## 2019-08-14 PROCEDURE — 160048 HCHG OR STATISTICAL LEVEL 1-5: Performed by: SURGERY

## 2019-08-14 PROCEDURE — 700111 HCHG RX REV CODE 636 W/ 250 OVERRIDE (IP): Performed by: SURGERY

## 2019-08-14 PROCEDURE — 73020 X-RAY EXAM OF SHOULDER: CPT | Mod: RT

## 2019-08-14 PROCEDURE — A6223 GAUZE >16<=48 NO W/SAL W/O B: HCPCS | Performed by: SURGERY

## 2019-08-14 PROCEDURE — 0RRJ00Z REPLACEMENT OF RIGHT SHOULDER JOINT WITH REVERSE BALL AND SOCKET SYNTHETIC SUBSTITUTE, OPEN APPROACH: ICD-10-PCS | Performed by: SURGERY

## 2019-08-14 PROCEDURE — 160035 HCHG PACU - 1ST 60 MINS PHASE I: Performed by: SURGERY

## 2019-08-14 PROCEDURE — 770001 HCHG ROOM/CARE - MED/SURG/GYN PRIV*

## 2019-08-14 PROCEDURE — 700112 HCHG RX REV CODE 229: Performed by: SURGERY

## 2019-08-14 PROCEDURE — 3E0T3BZ INTRODUCTION OF ANESTHETIC AGENT INTO PERIPHERAL NERVES AND PLEXI, PERCUTANEOUS APPROACH: ICD-10-PCS | Performed by: ANESTHESIOLOGY

## 2019-08-14 PROCEDURE — 700101 HCHG RX REV CODE 250: Performed by: ANESTHESIOLOGY

## 2019-08-14 PROCEDURE — A9270 NON-COVERED ITEM OR SERVICE: HCPCS | Performed by: SURGERY

## 2019-08-14 PROCEDURE — 502000 HCHG MISC OR IMPLANTS RC 0278: Performed by: SURGERY

## 2019-08-14 PROCEDURE — 160036 HCHG PACU - EA ADDL 30 MINS PHASE I: Performed by: SURGERY

## 2019-08-14 PROCEDURE — 85027 COMPLETE CBC AUTOMATED: CPT

## 2019-08-14 PROCEDURE — 700111 HCHG RX REV CODE 636 W/ 250 OVERRIDE (IP)

## 2019-08-14 PROCEDURE — 160002 HCHG RECOVERY MINUTES (STAT): Performed by: SURGERY

## 2019-08-14 PROCEDURE — 80048 BASIC METABOLIC PNL TOTAL CA: CPT

## 2019-08-14 PROCEDURE — A4565 SLINGS: HCPCS | Performed by: SURGERY

## 2019-08-14 PROCEDURE — 700105 HCHG RX REV CODE 258: Performed by: SURGERY

## 2019-08-14 PROCEDURE — 302196 LINEN, HYPOALLERGENIC: Performed by: SURGERY

## 2019-08-14 PROCEDURE — 160041 HCHG SURGERY MINUTES - EA ADDL 1 MIN LEVEL 4: Performed by: SURGERY

## 2019-08-14 PROCEDURE — 700105 HCHG RX REV CODE 258

## 2019-08-14 DEVICE — IMPLANTABLE DEVICE: Type: IMPLANTABLE DEVICE | Site: SHOULDER | Status: FUNCTIONAL

## 2019-08-14 RX ORDER — MEPERIDINE HYDROCHLORIDE 25 MG/ML
6.25 INJECTION INTRAMUSCULAR; INTRAVENOUS; SUBCUTANEOUS
Status: DISCONTINUED | OUTPATIENT
Start: 2019-08-14 | End: 2019-08-14 | Stop reason: HOSPADM

## 2019-08-14 RX ORDER — GABAPENTIN 300 MG/1
300 CAPSULE ORAL ONCE
Status: COMPLETED | OUTPATIENT
Start: 2019-08-14 | End: 2019-08-14

## 2019-08-14 RX ORDER — ROSUVASTATIN CALCIUM 10 MG/1
10 TABLET, COATED ORAL EVERY EVENING
COMMUNITY
End: 2020-05-29 | Stop reason: SDUPTHER

## 2019-08-14 RX ORDER — SODIUM CHLORIDE 9 MG/ML
INJECTION, SOLUTION INTRAVENOUS
Status: COMPLETED
Start: 2019-08-14 | End: 2019-08-15

## 2019-08-14 RX ORDER — DEXAMETHASONE SODIUM PHOSPHATE 4 MG/ML
INJECTION, SOLUTION INTRA-ARTICULAR; INTRALESIONAL; INTRAMUSCULAR; INTRAVENOUS; SOFT TISSUE
Status: COMPLETED | OUTPATIENT
Start: 2019-08-14 | End: 2019-08-14

## 2019-08-14 RX ORDER — ROCURONIUM BROMIDE 10 MG/ML
INJECTION, SOLUTION INTRAVENOUS PRN
Status: DISCONTINUED | OUTPATIENT
Start: 2019-08-14 | End: 2019-08-14 | Stop reason: SURG

## 2019-08-14 RX ORDER — ERYTHROMYCIN 5 MG/G
OINTMENT OPHTHALMIC
Refills: 0 | COMMUNITY
Start: 2019-07-09 | End: 2019-10-31

## 2019-08-14 RX ORDER — MIDAZOLAM HYDROCHLORIDE 1 MG/ML
INJECTION INTRAMUSCULAR; INTRAVENOUS PRN
Status: DISCONTINUED | OUTPATIENT
Start: 2019-08-14 | End: 2019-08-14 | Stop reason: SURG

## 2019-08-14 RX ORDER — OXYCODONE HCL 5 MG/5 ML
5 SOLUTION, ORAL ORAL
Status: COMPLETED | OUTPATIENT
Start: 2019-08-14 | End: 2019-08-14

## 2019-08-14 RX ORDER — POLYETHYLENE GLYCOL 3350 17 G/17G
1 POWDER, FOR SOLUTION ORAL 2 TIMES DAILY PRN
Status: DISCONTINUED | OUTPATIENT
Start: 2019-08-14 | End: 2019-08-15 | Stop reason: HOSPADM

## 2019-08-14 RX ORDER — DIPHENHYDRAMINE HYDROCHLORIDE 50 MG/ML
12.5 INJECTION INTRAMUSCULAR; INTRAVENOUS
Status: DISCONTINUED | OUTPATIENT
Start: 2019-08-14 | End: 2019-08-14 | Stop reason: HOSPADM

## 2019-08-14 RX ORDER — ACETAMINOPHEN 500 MG
1000 TABLET ORAL EVERY 6 HOURS
Status: DISCONTINUED | OUTPATIENT
Start: 2019-08-14 | End: 2019-08-15 | Stop reason: HOSPADM

## 2019-08-14 RX ORDER — ROSUVASTATIN CALCIUM 10 MG/1
10 TABLET, COATED ORAL EVERY EVENING
Status: DISCONTINUED | OUTPATIENT
Start: 2019-08-14 | End: 2019-08-15 | Stop reason: HOSPADM

## 2019-08-14 RX ORDER — HYDROCHLOROTHIAZIDE 25 MG/1
25 TABLET ORAL DAILY
Status: DISCONTINUED | OUTPATIENT
Start: 2019-08-15 | End: 2019-08-15 | Stop reason: HOSPADM

## 2019-08-14 RX ORDER — CEFAZOLIN SODIUM 1 G/3ML
INJECTION, POWDER, FOR SOLUTION INTRAMUSCULAR; INTRAVENOUS PRN
Status: DISCONTINUED | OUTPATIENT
Start: 2019-08-14 | End: 2019-08-14 | Stop reason: SURG

## 2019-08-14 RX ORDER — SCOLOPAMINE TRANSDERMAL SYSTEM 1 MG/1
1 PATCH, EXTENDED RELEASE TRANSDERMAL
Status: DISCONTINUED | OUTPATIENT
Start: 2019-08-14 | End: 2019-08-15

## 2019-08-14 RX ORDER — BISACODYL 10 MG
10 SUPPOSITORY, RECTAL RECTAL
Status: DISCONTINUED | OUTPATIENT
Start: 2019-08-14 | End: 2019-08-15 | Stop reason: HOSPADM

## 2019-08-14 RX ORDER — TRANEXAMIC ACID 100 MG/ML
INJECTION, SOLUTION INTRAVENOUS PRN
Status: DISCONTINUED | OUTPATIENT
Start: 2019-08-14 | End: 2019-08-14 | Stop reason: SURG

## 2019-08-14 RX ORDER — DIPHENHYDRAMINE HYDROCHLORIDE 50 MG/ML
25 INJECTION INTRAMUSCULAR; INTRAVENOUS EVERY 6 HOURS PRN
Status: DISCONTINUED | OUTPATIENT
Start: 2019-08-14 | End: 2019-08-15 | Stop reason: HOSPADM

## 2019-08-14 RX ORDER — ENEMA 19; 7 G/133ML; G/133ML
1 ENEMA RECTAL
Status: DISCONTINUED | OUTPATIENT
Start: 2019-08-14 | End: 2019-08-15 | Stop reason: HOSPADM

## 2019-08-14 RX ORDER — DEXAMETHASONE SODIUM PHOSPHATE 4 MG/ML
INJECTION, SOLUTION INTRA-ARTICULAR; INTRALESIONAL; INTRAMUSCULAR; INTRAVENOUS; SOFT TISSUE PRN
Status: DISCONTINUED | OUTPATIENT
Start: 2019-08-14 | End: 2019-08-14 | Stop reason: SURG

## 2019-08-14 RX ORDER — LABETALOL HYDROCHLORIDE 5 MG/ML
5 INJECTION, SOLUTION INTRAVENOUS
Status: DISCONTINUED | OUTPATIENT
Start: 2019-08-14 | End: 2019-08-14 | Stop reason: HOSPADM

## 2019-08-14 RX ORDER — IMIQUIMOD 12.5 MG/.25G
CREAM TOPICAL
Refills: 2 | COMMUNITY
Start: 2019-05-28 | End: 2020-01-02

## 2019-08-14 RX ORDER — DEXAMETHASONE SODIUM PHOSPHATE 4 MG/ML
4 INJECTION, SOLUTION INTRA-ARTICULAR; INTRALESIONAL; INTRAMUSCULAR; INTRAVENOUS; SOFT TISSUE
Status: DISCONTINUED | OUTPATIENT
Start: 2019-08-14 | End: 2019-08-15 | Stop reason: HOSPADM

## 2019-08-14 RX ORDER — DOCUSATE SODIUM 100 MG/1
100 CAPSULE, LIQUID FILLED ORAL 2 TIMES DAILY
Status: DISCONTINUED | OUTPATIENT
Start: 2019-08-14 | End: 2019-08-15 | Stop reason: HOSPADM

## 2019-08-14 RX ORDER — METOPROLOL SUCCINATE 50 MG/1
50 TABLET, EXTENDED RELEASE ORAL DAILY
Status: DISCONTINUED | OUTPATIENT
Start: 2019-08-15 | End: 2019-08-15 | Stop reason: HOSPADM

## 2019-08-14 RX ORDER — OXYCODONE HYDROCHLORIDE 5 MG/1
5 TABLET ORAL EVERY 4 HOURS PRN
Qty: 30 TAB | Refills: 0 | Status: SHIPPED | OUTPATIENT
Start: 2019-08-14 | End: 2019-08-21

## 2019-08-14 RX ORDER — OMEPRAZOLE 20 MG/1
20 CAPSULE, DELAYED RELEASE ORAL
Status: DISCONTINUED | OUTPATIENT
Start: 2019-08-14 | End: 2019-08-15 | Stop reason: HOSPADM

## 2019-08-14 RX ORDER — ONDANSETRON 2 MG/ML
4 INJECTION INTRAMUSCULAR; INTRAVENOUS EVERY 4 HOURS PRN
Status: DISCONTINUED | OUTPATIENT
Start: 2019-08-14 | End: 2019-08-15 | Stop reason: HOSPADM

## 2019-08-14 RX ORDER — OXYCODONE HCL 5 MG/5 ML
10 SOLUTION, ORAL ORAL
Status: COMPLETED | OUTPATIENT
Start: 2019-08-14 | End: 2019-08-14

## 2019-08-14 RX ORDER — ONDANSETRON 2 MG/ML
INJECTION INTRAMUSCULAR; INTRAVENOUS PRN
Status: DISCONTINUED | OUTPATIENT
Start: 2019-08-14 | End: 2019-08-14 | Stop reason: SURG

## 2019-08-14 RX ORDER — GENTAMICIN SULFATE 40 MG/ML
INJECTION, SOLUTION INTRAMUSCULAR; INTRAVENOUS PRN
Status: DISCONTINUED | OUTPATIENT
Start: 2019-08-14 | End: 2019-08-14 | Stop reason: SURG

## 2019-08-14 RX ORDER — ONDANSETRON 2 MG/ML
4 INJECTION INTRAMUSCULAR; INTRAVENOUS
Status: DISCONTINUED | OUTPATIENT
Start: 2019-08-14 | End: 2019-08-14 | Stop reason: HOSPADM

## 2019-08-14 RX ORDER — SUCCINYLCHOLINE CHLORIDE 20 MG/ML
INJECTION INTRAMUSCULAR; INTRAVENOUS PRN
Status: DISCONTINUED | OUTPATIENT
Start: 2019-08-14 | End: 2019-08-14 | Stop reason: SURG

## 2019-08-14 RX ORDER — LISINOPRIL 20 MG/1
40 TABLET ORAL DAILY
Status: DISCONTINUED | OUTPATIENT
Start: 2019-08-15 | End: 2019-08-15 | Stop reason: HOSPADM

## 2019-08-14 RX ORDER — AMOXICILLIN 250 MG
1 CAPSULE ORAL NIGHTLY
Status: DISCONTINUED | OUTPATIENT
Start: 2019-08-14 | End: 2019-08-15 | Stop reason: HOSPADM

## 2019-08-14 RX ORDER — AMOXICILLIN 250 MG
1 CAPSULE ORAL
Status: DISCONTINUED | OUTPATIENT
Start: 2019-08-14 | End: 2019-08-15 | Stop reason: HOSPADM

## 2019-08-14 RX ORDER — CEFAZOLIN SODIUM 2 G/100ML
2 INJECTION, SOLUTION INTRAVENOUS EVERY 8 HOURS
Status: COMPLETED | OUTPATIENT
Start: 2019-08-14 | End: 2019-08-15

## 2019-08-14 RX ORDER — OXYCODONE HYDROCHLORIDE 5 MG/1
5 TABLET ORAL
Status: DISCONTINUED | OUTPATIENT
Start: 2019-08-14 | End: 2019-08-15 | Stop reason: HOSPADM

## 2019-08-14 RX ORDER — ACETAMINOPHEN 500 MG
1000 TABLET ORAL ONCE
Status: COMPLETED | OUTPATIENT
Start: 2019-08-14 | End: 2019-08-14

## 2019-08-14 RX ORDER — OXYCODONE HYDROCHLORIDE 10 MG/1
10 TABLET ORAL
Status: DISCONTINUED | OUTPATIENT
Start: 2019-08-14 | End: 2019-08-15 | Stop reason: HOSPADM

## 2019-08-14 RX ORDER — LEVOTHYROXINE SODIUM 0.07 MG/1
75 TABLET ORAL DAILY
Status: DISCONTINUED | OUTPATIENT
Start: 2019-08-15 | End: 2019-08-15 | Stop reason: HOSPADM

## 2019-08-14 RX ORDER — SODIUM CHLORIDE, SODIUM LACTATE, POTASSIUM CHLORIDE, CALCIUM CHLORIDE 600; 310; 30; 20 MG/100ML; MG/100ML; MG/100ML; MG/100ML
INJECTION, SOLUTION INTRAVENOUS CONTINUOUS
Status: ACTIVE | OUTPATIENT
Start: 2019-08-14 | End: 2019-08-14

## 2019-08-14 RX ORDER — HALOPERIDOL 5 MG/ML
1 INJECTION INTRAMUSCULAR
Status: DISCONTINUED | OUTPATIENT
Start: 2019-08-14 | End: 2019-08-14 | Stop reason: HOSPADM

## 2019-08-14 RX ORDER — LIDOCAINE HYDROCHLORIDE 10 MG/ML
INJECTION, SOLUTION EPIDURAL; INFILTRATION; INTRACAUDAL; PERINEURAL
Status: COMPLETED
Start: 2019-08-14 | End: 2019-08-14

## 2019-08-14 RX ORDER — BUPIVACAINE HYDROCHLORIDE AND EPINEPHRINE 5; 5 MG/ML; UG/ML
INJECTION, SOLUTION EPIDURAL; INTRACAUDAL; PERINEURAL
Status: COMPLETED | OUTPATIENT
Start: 2019-08-14 | End: 2019-08-14

## 2019-08-14 RX ORDER — POLYETHYLENE GLYCOL 3350 17 G/17G
1 POWDER, FOR SOLUTION ORAL DAILY
Status: DISCONTINUED | OUTPATIENT
Start: 2019-08-14 | End: 2019-08-15 | Stop reason: HOSPADM

## 2019-08-14 RX ORDER — HALOPERIDOL 5 MG/ML
1 INJECTION INTRAMUSCULAR EVERY 6 HOURS PRN
Status: DISCONTINUED | OUTPATIENT
Start: 2019-08-14 | End: 2019-08-15 | Stop reason: HOSPADM

## 2019-08-14 RX ORDER — SODIUM CHLORIDE, SODIUM LACTATE, POTASSIUM CHLORIDE, CALCIUM CHLORIDE 600; 310; 30; 20 MG/100ML; MG/100ML; MG/100ML; MG/100ML
INJECTION, SOLUTION INTRAVENOUS CONTINUOUS
Status: DISCONTINUED | OUTPATIENT
Start: 2019-08-14 | End: 2019-08-14 | Stop reason: HOSPADM

## 2019-08-14 RX ADMIN — SODIUM CHLORIDE 500 ML: 9 INJECTION, SOLUTION INTRAVENOUS at 20:03

## 2019-08-14 RX ADMIN — CEFAZOLIN 2 G: 330 INJECTION, POWDER, FOR SOLUTION INTRAMUSCULAR; INTRAVENOUS at 12:21

## 2019-08-14 RX ADMIN — DOCUSATE SODIUM 100 MG: 100 CAPSULE, LIQUID FILLED ORAL at 19:53

## 2019-08-14 RX ADMIN — CEFAZOLIN SODIUM 2 G: 2 INJECTION, SOLUTION INTRAVENOUS at 19:51

## 2019-08-14 RX ADMIN — BUPIVACAINE HYDROCHLORIDE AND EPINEPHRINE BITARTRATE 12 ML: 5; .005 INJECTION, SOLUTION EPIDURAL; INTRACAUDAL; PERINEURAL at 12:12

## 2019-08-14 RX ADMIN — SODIUM CHLORIDE, POTASSIUM CHLORIDE, SODIUM LACTATE AND CALCIUM CHLORIDE: 600; 310; 30; 20 INJECTION, SOLUTION INTRAVENOUS at 11:20

## 2019-08-14 RX ADMIN — FENTANYL CITRATE 50 MCG: 50 INJECTION, SOLUTION INTRAMUSCULAR; INTRAVENOUS at 15:00

## 2019-08-14 RX ADMIN — FENTANYL CITRATE 25 MCG: 50 INJECTION, SOLUTION INTRAMUSCULAR; INTRAVENOUS at 15:06

## 2019-08-14 RX ADMIN — MIDAZOLAM 2 MG: 1 INJECTION INTRAMUSCULAR; INTRAVENOUS at 12:12

## 2019-08-14 RX ADMIN — ROCURONIUM BROMIDE 50 MG: 10 INJECTION, SOLUTION INTRAVENOUS at 12:26

## 2019-08-14 RX ADMIN — LIDOCAINE HYDROCHLORIDE 0.5 ML: 10 INJECTION, SOLUTION EPIDURAL; INFILTRATION; INTRACAUDAL at 11:19

## 2019-08-14 RX ADMIN — DEXAMETHASONE SODIUM PHOSPHATE 4 MG: 4 INJECTION, SOLUTION INTRA-ARTICULAR; INTRALESIONAL; INTRAMUSCULAR; INTRAVENOUS; SOFT TISSUE at 12:12

## 2019-08-14 RX ADMIN — FENTANYL CITRATE 25 MCG: 50 INJECTION, SOLUTION INTRAMUSCULAR; INTRAVENOUS at 14:14

## 2019-08-14 RX ADMIN — GABAPENTIN 300 MG: 300 CAPSULE ORAL at 11:19

## 2019-08-14 RX ADMIN — DEXAMETHASONE SODIUM PHOSPHATE 4 MG: 4 INJECTION, SOLUTION INTRA-ARTICULAR; INTRALESIONAL; INTRAMUSCULAR; INTRAVENOUS; SOFT TISSUE at 12:26

## 2019-08-14 RX ADMIN — HALOPERIDOL LACTATE 1 MG: 5 INJECTION, SOLUTION INTRAMUSCULAR at 15:10

## 2019-08-14 RX ADMIN — PROPOFOL 150 MG: 10 INJECTION, EMULSION INTRAVENOUS at 12:26

## 2019-08-14 RX ADMIN — ROSUVASTATIN CALCIUM 10 MG: 10 TABLET, FILM COATED ORAL at 19:53

## 2019-08-14 RX ADMIN — FENTANYL CITRATE 50 MCG: 50 INJECTION, SOLUTION INTRAMUSCULAR; INTRAVENOUS at 12:13

## 2019-08-14 RX ADMIN — ALBUTEROL SULFATE 2.5 MG: 2.5 SOLUTION RESPIRATORY (INHALATION) at 16:55

## 2019-08-14 RX ADMIN — OXYCODONE HYDROCHLORIDE 10 MG: 5 SOLUTION ORAL at 14:13

## 2019-08-14 RX ADMIN — ONDANSETRON 4 MG: 2 INJECTION INTRAMUSCULAR; INTRAVENOUS at 12:26

## 2019-08-14 RX ADMIN — ACETAMINOPHEN 1000 MG: 500 TABLET ORAL at 11:19

## 2019-08-14 RX ADMIN — FENTANYL CITRATE 50 MCG: 50 INJECTION, SOLUTION INTRAMUSCULAR; INTRAVENOUS at 12:26

## 2019-08-14 RX ADMIN — TRANEXAMIC ACID 1000 MG: 100 INJECTION, SOLUTION INTRAVENOUS at 12:42

## 2019-08-14 RX ADMIN — GENTAMICIN SULFATE 160 MG: 40 INJECTION, SOLUTION INTRAMUSCULAR; INTRAVENOUS at 12:21

## 2019-08-14 RX ADMIN — ACETAMINOPHEN 1000 MG: 500 TABLET ORAL at 19:53

## 2019-08-14 RX ADMIN — SUCCINYLCHOLINE CHLORIDE 100 MG: 20 INJECTION, SOLUTION INTRAMUSCULAR; INTRAVENOUS at 12:26

## 2019-08-14 RX ADMIN — LIDOCAINE HYDROCHLORIDE 5 ML: 20 JELLY TOPICAL at 12:26

## 2019-08-14 ASSESSMENT — PAIN SCALES - GENERAL: PAIN_LEVEL: 1

## 2019-08-14 NOTE — ANESTHESIA PROCEDURE NOTES
Peripheral Block  Date/Time: 8/14/2019 12:12 PM  Performed by: Edward Montano D.O.  Authorized by: Edward Montano D.O.     Start Time:  8/14/2019 12:12 PM  End Time:  8/14/2019 12:20 PM  Reason for Block: at surgeon's request and post-op pain management    patient identified, IV checked, site marked, risks and benefits discussed, surgical consent, monitors and equipment checked, pre-op evaluation and timeout performed    Patient Position:  Supine  Prep: ChloraPrep    Monitoring:  Heart rate, continuous pulse ox and cardiac monitor  Block Region:  Upper Extremity  Upper Extremity - Block Type:  BRACHIAL PLEXUS block, Interscalene approach    Laterality:  Right  Procedures: ultrasound guided  Image captured, interpreted and electronically stored.  Local Infiltration:  Lidocaine  Strength:  1 %  Dose:  3 ml  Block Type:  Single-shot  Needle Length:  50mm  Needle Gauge:  22 G  Needle Localization:  Ultrasound guidance  Injection Assessment:  Negative aspiration for heme, no paresthesia on injection, incremental injection and local visualized surrounding nerve on ultrasound

## 2019-08-14 NOTE — OP REPORT
SURGEON:  Parish Maria MD     ASSISTANT:  Gabriele Reese PA-C     PREOPERATIVE DIAGNOSIS:  Right shoulder arthritis with rotator cuff tear      POSTOPERATIVE DIAGNOSIS:  Right shoulder arthritis with rotator cuff tear      PROCEDURES PERFORMED:  1.  Right reverse total shoulder arthroplasty.  2.  Right open biceps tenodesis.       ANESTHESIOLOGIST: Dr. Charmaine MD     ANESTHESIA:  General with upper extremity nerve block for pain control.     COMPLICATIONS:  None noted.     DRAINS:  Hemovac x1.     SPECIMENS:  None.     ESTIMATED BLOOD LOSS:  100 mL    IMPLANTS: Integra Titan Reverse Total shoulder arthroplasty system; 11mm pres-fit stem; 6mm polyethylene; 38mm x 5mm lateralized glenosphere with a 13mm base plate with multiple locking and non-locking screws.      INDICATIONS:  This patient is well known to me through my   outpatient clinic for the above-mentioned diagnosis. The patient believes and I would agree they have failed conservative treatment and are a candidate for the   above-mentioned procedure.  Prior to the procedure, the patient understood the risks,   benefits, and alternatives to surgery.  The patient understood the risks to include,   but not limited to the risk of infection requiring repeat surgery, bleeding   requiring blood transfusion, nerve, blood vessel, tendon injury requiring   repair, chronic pain, periprosthetic fracture subsidence, loosening or   dislocation requiring revision surgery, DVT, pulmonary embolism, heart attack,   stroke, and even death.  The patient states despite these risks, the patient wished   to proceed with surgery.     DESCRIPTION OF PROCEDURE:  The patient was met in the preoperative holding   area.  The right shoulder was initialed as correct operative site.  The patient had an   opportunity to ask questions, all questions were answered and informed consent was obtained.     The patient was brought to the operating room and laid supine on the  operating   room table.  All bony and dependent prominences were well-padded.  Upper   extremity nerve block and general anesthesia were induced without any   complication.  Ancef and clindamycin were administered for infection   prophylaxis.  The patient was placed in well-padded beach chair position.  The right   upper extremity was prepped and draped in the usual sterile fashion.  A formal   time-out was performed, in which all parties agreed upon the correct patient,   procedure, and operative site.  I began the procedure by making an oblique   incision in the anterior aspect of the shoulder for a deltopectoral approach.    I identified the cephalic vein, retracted it medially, and cauterized lateral   branches.  I released the upper third of pectoralis major tendon to help   mobilize the shoulder.  Deep to this, I identified the long head of biceps   tendon.  Under resting tension, I performed open biceps tenodesis to the upper   half of the pectoralis major tendon with 0 Ethibond suture to prevent painful   silver deformity and subluxation into the implant.  I then excised the   proximal aspect of the tendon including the labral insertion.  I then   performed a longitudinal subscapularis tenotomy while palpating and protecting   the axillary nerve.  I tagged it for later repair, dislocated the shoulder and noted significant arthritis. Osteophytes were removed with a rongeur. I then performed an anatomic neck cut at 30 degrees retroversion.  supraspinatus was torn. I then placed glenoid retractors, excised the labrum and released the capsule circumferentially around the glenoid while palpating and protecting the axillary nerve.  She had some posterior wear.  I preferentially reamed slightly anterior to correct version using the guide and the guidewire.  I then drilled for the central peg.  Using the Titan system, I placed 15 mm baseplate with multiple locking   and nonlocking screws and a 38 mm x 5 mm  lateralized glenosphere, which had   excellent fixation of the scapula.  I turned my attention back to the humerus,   broached up to the above mentioned stem size, which had excellent press fit and stability.  I reamed the metaphysis and placed the implant, standard body,  stem press fit. I trialed  Polyethylene sized up to the above mentioned size, which had excellent range of motion and stability.  I placed this final component after copious irrigation of the   joint.  I then repaired subscapularis tenotomy with #5 Ti-Cron suture, took   the shoulder through range of motion.  There was excellent range of motion and  stability.  I then closed the skin with 2-0 Monocryl suture and skin stapler, all covered with sterile Xeroform, 4x4s,   Ioban, and a sling.       The patient was transferred in stable condition to PACU where there were no   immediate post-term complaints.     POSTOPERATIVE PLAN:  The patient will be admitted overnight for pain control   and will be discharged home, likely on postoperative day #1.

## 2019-08-14 NOTE — ANESTHESIA POSTPROCEDURE EVALUATION
Patient: Prince Mazariegos    Procedure Summary     Date:  08/14/19 Room / Location:  [facility] 14 / SURGERY [facility]    Anesthesia Start:  1221 Anesthesia Stop:  1404    Procedure:  ARTHROPLASTY, SHOULDER, TOTAL- REVERSE (Right Shoulder) Diagnosis:  (SHOULDER PAIN RIGHT)    Surgeon:  Parish Maria M.D. Responsible Provider:  Edward Montano D.O.    Anesthesia Type:  general ASA Status:  3          Final Anesthesia Type: general  Last vitals  BP   Blood Pressure : 130/58    Temp   36.8 °C (98.2 °F)    Pulse   Pulse: 79   Resp   18    SpO2   96 %      Anesthesia Post Evaluation    Patient location during evaluation: bedside  Patient participation: complete - patient participated  Level of consciousness: awake and alert  Pain score: 1    Airway patency: patent  Anesthetic complications: no  Cardiovascular status: adequate  Respiratory status: acceptable  Hydration status: acceptable    PONV: none           Nurse Pain Score: 1 (NPRS)

## 2019-08-14 NOTE — ANESTHESIA TIME REPORT
Anesthesia Start and Stop Event Times     Date Time Event    8/14/2019 0957 Ready for Procedure     1221 Anesthesia Start     1404 Anesthesia Stop        Responsible Staff  08/14/19    Name Role Begin End    Edward Montano D.O. Anesth 1221 1404        Preop Diagnosis (Free Text):  Pre-op Diagnosis     SHOULDER PAIN RIGHT        Preop Diagnosis (Codes):    Post op Diagnosis  Shoulder sprain  shoulder pain    Premium Reason  Non-Premium    Comments:

## 2019-08-14 NOTE — ANESTHESIA PROCEDURE NOTES
Airway  Date/Time: 8/14/2019 12:28 PM  Performed by: Edward Montano D.O.  Authorized by: Edward Montano D.O.     Location:  OR  Urgency:  Elective  Indications for Airway Management:  Anesthesia  Spontaneous Ventilation: absent    Sedation Level:  Deep  Preoxygenated: Yes    Patient Position:  Sniffing  Final Airway Type:  Endotracheal airway  Final Endotracheal Airway:  ETT  Cuffed: Yes    Technique Used for Successful ETT Placement:  Direct laryngoscopy  Insertion Site:  Oral  Blade Type:  Yue  Laryngoscope Blade/Videolaryngoscope Blade Size:  3  ETT Size (mm):  8.0  Measured from:  Gums  ETT to Gums (cm):  20  Placement Verified by: auscultation and capnometry    Cormack-Lehane Classification:  Grade I - full view of glottis  Number of Attempts at Approach:  1

## 2019-08-15 VITALS
HEART RATE: 76 BPM | HEIGHT: 62 IN | WEIGHT: 225.31 LBS | OXYGEN SATURATION: 94 % | TEMPERATURE: 98 F | SYSTOLIC BLOOD PRESSURE: 99 MMHG | BODY MASS INDEX: 41.46 KG/M2 | RESPIRATION RATE: 16 BRPM | DIASTOLIC BLOOD PRESSURE: 64 MMHG

## 2019-08-15 PROCEDURE — 700102 HCHG RX REV CODE 250 W/ 637 OVERRIDE(OP): Performed by: SURGERY

## 2019-08-15 PROCEDURE — A9270 NON-COVERED ITEM OR SERVICE: HCPCS | Performed by: SURGERY

## 2019-08-15 PROCEDURE — 700111 HCHG RX REV CODE 636 W/ 250 OVERRIDE (IP): Performed by: SURGERY

## 2019-08-15 RX ADMIN — LISINOPRIL 40 MG: 20 TABLET ORAL at 05:48

## 2019-08-15 RX ADMIN — OXYCODONE HYDROCHLORIDE 5 MG: 5 TABLET ORAL at 12:45

## 2019-08-15 RX ADMIN — CEFAZOLIN SODIUM 2 G: 2 INJECTION, SOLUTION INTRAVENOUS at 03:06

## 2019-08-15 RX ADMIN — ACETAMINOPHEN 1000 MG: 500 TABLET ORAL at 11:23

## 2019-08-15 RX ADMIN — LEVOTHYROXINE SODIUM 75 MCG: 75 TABLET ORAL at 05:47

## 2019-08-15 RX ADMIN — OXYCODONE HYDROCHLORIDE 10 MG: 10 TABLET ORAL at 16:06

## 2019-08-15 RX ADMIN — METOPROLOL SUCCINATE 50 MG: 50 TABLET, EXTENDED RELEASE ORAL at 05:48

## 2019-08-15 RX ADMIN — ACETAMINOPHEN 1000 MG: 500 TABLET ORAL at 05:47

## 2019-08-15 RX ADMIN — OXYCODONE HYDROCHLORIDE 5 MG: 5 TABLET ORAL at 05:57

## 2019-08-15 ASSESSMENT — LIFESTYLE VARIABLES
HAVE PEOPLE ANNOYED YOU BY CRITICIZING YOUR DRINKING: NO
EVER FELT BAD OR GUILTY ABOUT YOUR DRINKING: NO
EVER HAD A DRINK FIRST THING IN THE MORNING TO STEADY YOUR NERVES TO GET RID OF A HANGOVER: NO
HAVE YOU EVER FELT YOU SHOULD CUT DOWN ON YOUR DRINKING: NO
TOTAL SCORE: 0
ALCOHOL_USE: NO
DOES PATIENT WANT TO STOP DRINKING: CANNOT ASSESS
HOW MANY TIMES IN THE PAST YEAR HAVE YOU HAD 5 OR MORE DRINKS IN A DAY: 0
EVER_SMOKED: NEVER
TOTAL SCORE: 0
TOTAL SCORE: 0
CONSUMPTION TOTAL: NEGATIVE
AVERAGE NUMBER OF DAYS PER WEEK YOU HAVE A DRINK CONTAINING ALCOHOL: 0
ON A TYPICAL DAY WHEN YOU DRINK ALCOHOL HOW MANY DRINKS DO YOU HAVE: 0

## 2019-08-15 ASSESSMENT — COGNITIVE AND FUNCTIONAL STATUS - GENERAL
HELP NEEDED FOR BATHING: A LITTLE
STANDING UP FROM CHAIR USING ARMS: A LITTLE
MOBILITY SCORE: 17
WALKING IN HOSPITAL ROOM: A LITTLE
TOILETING: A LOT
SUGGESTED CMS G CODE MODIFIER DAILY ACTIVITY: CK
CLIMB 3 TO 5 STEPS WITH RAILING: A LITTLE
MOVING FROM LYING ON BACK TO SITTING ON SIDE OF FLAT BED: A LOT
DAILY ACTIVITIY SCORE: 17
MOVING TO AND FROM BED TO CHAIR: A LITTLE
TURNING FROM BACK TO SIDE WHILE IN FLAT BAD: A LITTLE
DRESSING REGULAR UPPER BODY CLOTHING: A LOT
DRESSING REGULAR LOWER BODY CLOTHING: A LOT
SUGGESTED CMS G CODE MODIFIER MOBILITY: CK

## 2019-08-15 ASSESSMENT — PATIENT HEALTH QUESTIONNAIRE - PHQ9
2. FEELING DOWN, DEPRESSED, IRRITABLE, OR HOPELESS: NOT AT ALL
SUM OF ALL RESPONSES TO PHQ9 QUESTIONS 1 AND 2: 0
1. LITTLE INTEREST OR PLEASURE IN DOING THINGS: NOT AT ALL

## 2019-08-15 NOTE — DIETARY
NUTRITION SERVICES: BMI - Pt with BMI >40 (=Body mass index is 41.21 kg/m².), morbid obesity. Weight loss counseling not appropriate in acute care setting. RECOMMEND - Referral to outpatient nutrition services for weight management after D/C.

## 2019-08-15 NOTE — CARE PLAN
Problem: Safety  Goal: Will remain free from falls  Outcome: PROGRESSING AS EXPECTED     Problem: Discharge Barriers/Planning  Goal: Patient's continuum of care needs will be met  Outcome: MET

## 2019-08-15 NOTE — OR NURSING
Pt started to feel like she couldn't breathe. Explained how the nerve block can numb up the areas that make breathing easy. O2 sats in mid 90s on room air. R lung sounds are diminished.  VSS. Dressing to L shoulder is clean dry and intact. Will continue to monitor.

## 2019-08-15 NOTE — DISCHARGE INSTRUCTIONS
*Follow up with Dr. Maria in 11 days  *Non Weight Bearing to the Right Upper extremity                 *Activity as tolerated  *Use assistive device for all activity  *Elevate arm as needed  *Ice as needed (20 minutes every 1-2 hours)  *Change dressing on post op day 5  *Ok to shower on post op day 5  *No soaking of the incision; no baths, hot tubs, or swimming until cleared by doctor         *No driving until cleared by doctor  *Take medications as prescribed by doctor  *Call doctor’s office with any questions or concerns     Discharge Instructions    Discharged to home by car with relative. Discharged via wheelchair, hospital escort: Yes.  Special equipment needed: Not Applicable    Be sure to schedule a follow-up appointment with your primary care doctor or any specialists as instructed.     Discharge Plan:   Diet Plan: Discussed  Activity Level: Discussed  Confirmed Follow up Appointment: Appointment Scheduled  Confirmed Symptoms Management: Discussed  Medication Reconciliation Updated: Yes  Influenza Vaccine Indication: Patient Refuses    I understand that a diet low in cholesterol, fat, and sodium is recommended for good health. Unless I have been given specific instructions below for another diet, I accept this instruction as my diet prescription.   Other diet: regular    Special Instructions: Discharge instructions for the Orthopedic Patient    Follow up with Primary Care Physician within 2 weeks of discharge to home, regarding:  Review of medications and diagnostic testing.  Surveillance for medical complications.  Workup and treatment of osteoporosis, if appropriate.     -Is this a Joint Replacement patient? No    -Is this patient being discharged with medication to prevent blood clots?  No    · Is patient discharged on Warfarin / Coumadin?   No       Incision Care, Adult  An incision is a surgical cut that is made through your skin. Most incisions are closed after surgery. Your incision may be  closed with stitches (sutures), staples, skin glue, or adhesive strips. You may need to return to your health care provider to have sutures or staples removed. This may occur several days to several weeks after your surgery. The incision needs to be cared for properly to prevent infection.  How to care for your incision  Incision care   · Follow instructions from your health care provider about how to take care of your incision. Make sure you:  ¨ Wash your hands with soap and water before you change the bandage (dressing). If soap and water are not available, use hand .  ¨ Change your dressing as told by your health care provider.  ¨ Leave sutures, skin glue, or adhesive strips in place. These skin closures may need to stay in place for 2 weeks or longer. If adhesive strip edges start to loosen and curl up, you may trim the loose edges. Do not remove adhesive strips completely unless your health care provider tells you to do that.  · Check your incision area every day for signs of infection. Check for:  ¨ More redness, swelling, or pain.  ¨ More fluid or blood.  ¨ Warmth.  ¨ Pus or a bad smell.  · Ask your health care provider how to clean the incision. This may include:  ¨ Using mild soap and water.  ¨ Using a clean towel to pat the incision dry after cleaning it.  ¨ Applying a cream or ointment. Do this only as told by your health care provider.  ¨ Covering the incision with a clean dressing.  · Ask your health care provider when you can leave the incision uncovered.  · Do not take baths, swim, or use a hot tub until your health care provider approves. Ask your health care provider if you can take showers. You may only be allowed to take sponge baths for bathing.  Medicines  · If you were prescribed an antibiotic medicine, cream, or ointment, take or apply the antibiotic as told by your health care provider. Do not stop taking or applying the antibiotic even if your condition improves.  · Take  over-the-counter and prescription medicines only as told by your health care provider.  General instructions  · Limit movement around your incision to improve healing.  ¨ Avoid straining, lifting, or exercise for the first month, or for as long as told by your health care provider.  ¨ Follow instructions from your health care provider about returning to your normal activities.  ¨ Ask your health care provider what activities are safe.  · Protect your incision from the sun when you are outside for the first 6 months, or for as long as told by your health care provider. Apply sunscreen around the scar or cover it up.  · Keep all follow-up visits as told by your health care provider. This is important.  Contact a health care provider if:  · Your have more redness, swelling, or pain around the incision.  · You have more fluid or blood coming from the incision.  · Your incision feels warm to the touch.  · You have pus or a bad smell coming from the incision.  · You have a fever or shaking chills.  · You are nauseous or you vomit.  · You are dizzy.  · Your sutures or staples come undone.  Get help right away if:  · You have a red streak coming from your incision.  · Your incision bleeds through the dressing and the bleeding does not stop with gentle pressure.  · The edges of your incision open up and separate.  · You have severe pain.  · You have a rash.  · You are confused.  · You faint.  · You have trouble breathing and a fast heartbeat.  This information is not intended to replace advice given to you by your health care provider. Make sure you discuss any questions you have with your health care provider.  Document Released: 07/07/2006 Document Revised: 08/25/2017 Document Reviewed: 07/05/2017  Elsevier Interactive Patient Education © 2017 [employer]    Depression / Suicide Risk    As you are discharged from this Critical access hospital facility, it is important to learn how to keep safe from harming yourself.    Recognize  the warning signs:  · Abrupt changes in personality, positive or negative- including increase in energy   · Giving away possessions  · Change in eating patterns- significant weight changes-  positive or negative  · Change in sleeping patterns- unable to sleep or sleeping all the time   · Unwillingness or inability to communicate  · Depression  · Unusual sadness, discouragement and loneliness  · Talk of wanting to die  · Neglect of personal appearance   · Rebelliousness- reckless behavior  · Withdrawal from people/activities they love  · Confusion- inability to concentrate     If you or a loved one observes any of these behaviors or has concerns about self-harm, here's what you can do:  · Talk about it- your feelings and reasons for harming yourself  · Remove any means that you might use to hurt yourself (examples: pills, rope, extension cords, firearm)  · Get professional help from the community (Mental Health, Substance Abuse, psychological counseling)  · Do not be alone:Call your Safe Contact- someone whom you trust who will be there for you.  · Call your local CRISIS HOTLINE 751-2019 or 234-092-0883  · Call your local Children's Mobile Crisis Response Team [place] (395) 987-2643 or www.[employer]  · Call the toll free National Suicide Prevention Hotlines   · National Suicide Prevention Lifeline 176-824-EKJS (3348)  · National Hope Line Network 800-SUICIDE (776-5527)

## 2019-08-15 NOTE — PROGRESS NOTES
Report received from Belén PACU RN.Pt arrived to the floor via gurney with transport at 1820. /O x4. VSS. Responds appropriately. Denies pain, SOB. Assessment complete. Surgical dressing to the left shoulder in place, cdi, sling for comfort, ice pack applied.  in use. Discussed POC, pain control, mobility, non weightbearing to left shoulder, safety, DC  planning, pt verbalizes understanding. Explained importance of calling before getting OOB. Call light and belongings within reach.  Bed in the lowest position. Treaded socks in place. Hourly rounding in progress. Will continue to monitor .

## 2019-08-15 NOTE — PROGRESS NOTES
2 RN skin check complete Sybil VALDES  Devices in place : PIV, sling for comfort.  Skin assessed under devices : yes.  Confirmed pressure ulcers found on : none.  New potential pressure ulcers noted on : none.     Surgical dressing to the right shoulder in place, cdi, sling for comfort    The following interventions in place: rob skin assessment, pt turns self from side to side, barrier cream, moisturizer, heels floated with pillows

## 2019-08-15 NOTE — CARE PLAN
Problem: Safety  Goal: Will remain free from falls  Outcome: PROGRESSING AS EXPECTED  Intervention: Implement fall precautions  Note:   Pt calls appropriately, treaded socks in use. Personal belongings and call light with in reach and bed is locked in lowest position.      Problem: Respiratory:  Goal: Respiratory status will improve  Outcome: PROGRESSING AS EXPECTED  Intervention: Educate and encourage incentive spirometry usage  Note:   Education provided on how to use the IS and why. Return demonstration provided.

## 2019-10-01 ENCOUNTER — NON-PROVIDER VISIT (OUTPATIENT)
Dept: MEDICAL GROUP | Facility: MEDICAL CENTER | Age: 69
End: 2019-10-01
Payer: MEDICARE

## 2019-10-01 DIAGNOSIS — Z23 NEED FOR VACCINATION: ICD-10-CM

## 2019-10-01 PROCEDURE — 90662 IIV NO PRSV INCREASED AG IM: CPT | Performed by: FAMILY MEDICINE

## 2019-10-01 PROCEDURE — G0008 ADMIN INFLUENZA VIRUS VAC: HCPCS | Performed by: FAMILY MEDICINE

## 2019-10-31 ENCOUNTER — OFFICE VISIT (OUTPATIENT)
Dept: MEDICAL GROUP | Facility: MEDICAL CENTER | Age: 69
End: 2019-10-31
Payer: MEDICARE

## 2019-10-31 VITALS
TEMPERATURE: 97.2 F | HEIGHT: 62 IN | OXYGEN SATURATION: 93 % | WEIGHT: 223 LBS | DIASTOLIC BLOOD PRESSURE: 68 MMHG | SYSTOLIC BLOOD PRESSURE: 112 MMHG | BODY MASS INDEX: 41.04 KG/M2

## 2019-10-31 DIAGNOSIS — Z23 NEED FOR VACCINATION: ICD-10-CM

## 2019-10-31 DIAGNOSIS — I49.9 IRREGULAR HEARTBEAT: ICD-10-CM

## 2019-10-31 PROCEDURE — G0009 ADMIN PNEUMOCOCCAL VACCINE: HCPCS | Performed by: FAMILY MEDICINE

## 2019-10-31 PROCEDURE — 99214 OFFICE O/P EST MOD 30 MIN: CPT | Mod: 25 | Performed by: FAMILY MEDICINE

## 2019-10-31 PROCEDURE — 90670 PCV13 VACCINE IM: CPT | Performed by: FAMILY MEDICINE

## 2019-10-31 NOTE — ASSESSMENT & PLAN NOTE
Has had irregular heartbeat in the past, has been taking metoprolol SR 50 mg daily. Over the last 2 weeks, it feels like fast heart rate is going to start so she coughs to get rid of it. Happens almost daily last week in the afternoon. Admits to being under a lot of stress because right total shoulder replacement 3 months ago and still can't function well. Sleeping well and using CPAP machine. Drinks 64 oz of water daily.

## 2019-10-31 NOTE — PROGRESS NOTES
Subjective:   Prince Mazariegos is a 69 y.o. female here today for irregular heartbeat    Irregular heartbeat  Has had irregular heartbeat in the past, has been taking metoprolol SR 50 mg daily. Over the last 2 weeks, it feels like fast heart rate is going to start so she coughs to get rid of it. Happens almost daily last week in the afternoon. Admits to being under a lot of stress because right total shoulder replacement 3 months ago and still can't function well. Sleeping well and using CPAP machine. Drinks 64 oz of water daily.         Current medicines (including changes today)  Current Outpatient Medications   Medication Sig Dispense Refill   • rosuvastatin (CRESTOR) 10 MG Tab Take 10 mg by mouth every evening.     • imiquimod (ALDARA) 5 % cream APPLY THIN LAYER TO AFFECTED AREA MORNING AND EVENING FOR 2 WEEKS THEN STOP. DECREASE WITH HEAVY IRRITATION  2   • acetaminophen (TYLENOL) 325 MG Tab Take 2 Tabs by mouth every 8 hours as needed.     • lisinopril (PRINIVIL, ZESTRIL) 40 MG tablet Take 1 Tab by mouth every day. 90 Tab 3   • levothyroxine (SYNTHROID) 75 MCG Tab Take 1 Tab by mouth every day. 90 Tab 3   • hydroCHLOROthiazide (HYDRODIURIL) 25 MG Tab Take 1 Tab by mouth every day. 90 Tab 3   • metoprolol SR (TOPROL XL) 50 MG TABLET SR 24 HR Take 1 Tab by mouth every day. 90 Tab 3   • Cholecalciferol (VITAMIN D) 2000 UNIT Tab Take 2,000 Units by mouth every evening.     • Probiotic Product (PROBIOTIC-10 PO) Take 1 Tab by mouth every day.     • omeprazole (PRILOSEC) 20 MG delayed-release capsule Take 20 mg by mouth 1 time daily as needed.     • polyethylene glycol 3350 (MIRALAX) Powder Take 17 g by mouth every day.     • multivitamin (THERAGRAN) Tab Take 1 Tab by mouth every evening.     • Fiber Powder Take 1 Each by mouth every day.     • Calcium 500 MG Chew Tab Take 1,000 mg by mouth every evening.       No current facility-administered medications for this visit.      She  has a past medical history of  "Anesthesia, Arrhythmia, Arthritis, Back pain, Breath shortness, Chest pain (May 2016), Chickenpox, DDD (degenerative disc disease), cervical, Disorder of thyroid, Enlarged tonsils, GERD (gastroesophageal reflux disease), [place] measles, Hemorrhoids, High cholesterol, Hypertension, Obesity, Pain, PONV (postoperative nausea and vomiting), Psychiatric disorder, Skin cancer, Sleep apnea, and Snoring.    ROS   No lightheadedness, no fever       Objective:     /68 (BP Location: Right arm, Patient Position: Sitting)   Temp 36.2 °C (97.2 °F)   Ht 1.575 m (5' 2\")   Wt 101.2 kg (223 lb)   SpO2 93%  Body mass index is 40.79 kg/m².   Physical Exam:  Constitutional: Alert, no distress.  Skin: Warm, dry, good turgor, no rashes in visible areas.  Eye: Equal, round and reactive, conjunctiva clear, lids normal.  Cardiovascular: Normal S1, S2, + systolic murmur.  Psych: Alert and oriented x3, normal affect and mood.          Assessment and Plan:   The following treatment plan was discussed    1. Irregular heartbeat  New problem. No concerning symptoms. Might be stress induced. Check Zio Patch and call with results.  Consider increase in metoprolol SR from 50 mg daily to 100 mg daily for symptoms if needed.  - HOLTER MONITOR/EVENT RECORDER -Cardiology Performed; Future    2. Need for vaccination  - Pneumococcal Conjugate Vaccine 13-Valent      Followup: Return if symptoms worsen or fail to improve.         "

## 2019-11-01 ENCOUNTER — HOSPITAL ENCOUNTER (OUTPATIENT)
Dept: RADIOLOGY | Facility: MEDICAL CENTER | Age: 69
End: 2019-11-01
Attending: FAMILY MEDICINE
Payer: MEDICARE

## 2019-11-01 DIAGNOSIS — Z12.31 VISIT FOR SCREENING MAMMOGRAM: ICD-10-CM

## 2019-11-01 PROCEDURE — 77063 BREAST TOMOSYNTHESIS BI: CPT

## 2019-11-05 ENCOUNTER — APPOINTMENT (RX ONLY)
Dept: URBAN - METROPOLITAN AREA CLINIC 20 | Facility: CLINIC | Age: 69
Setting detail: DERMATOLOGY
End: 2019-11-05

## 2019-11-05 DIAGNOSIS — D18.0 HEMANGIOMA: ICD-10-CM

## 2019-11-05 DIAGNOSIS — D22 MELANOCYTIC NEVI: ICD-10-CM

## 2019-11-05 DIAGNOSIS — L81.4 OTHER MELANIN HYPERPIGMENTATION: ICD-10-CM

## 2019-11-05 DIAGNOSIS — L82.1 OTHER SEBORRHEIC KERATOSIS: ICD-10-CM

## 2019-11-05 DIAGNOSIS — L57.0 ACTINIC KERATOSIS: ICD-10-CM | Status: INADEQUATELY CONTROLLED

## 2019-11-05 DIAGNOSIS — L57.8 OTHER SKIN CHANGES DUE TO CHRONIC EXPOSURE TO NONIONIZING RADIATION: ICD-10-CM

## 2019-11-05 PROBLEM — D22.5 MELANOCYTIC NEVI OF TRUNK: Status: ACTIVE | Noted: 2019-11-05

## 2019-11-05 PROBLEM — D22.71 MELANOCYTIC NEVI OF RIGHT LOWER LIMB, INCLUDING HIP: Status: ACTIVE | Noted: 2019-11-05

## 2019-11-05 PROBLEM — D22.72 MELANOCYTIC NEVI OF LEFT LOWER LIMB, INCLUDING HIP: Status: ACTIVE | Noted: 2019-11-05

## 2019-11-05 PROBLEM — D18.01 HEMANGIOMA OF SKIN AND SUBCUTANEOUS TISSUE: Status: ACTIVE | Noted: 2019-11-05

## 2019-11-05 PROBLEM — D22.62 MELANOCYTIC NEVI OF LEFT UPPER LIMB, INCLUDING SHOULDER: Status: ACTIVE | Noted: 2019-11-05

## 2019-11-05 PROBLEM — D22.61 MELANOCYTIC NEVI OF RIGHT UPPER LIMB, INCLUDING SHOULDER: Status: ACTIVE | Noted: 2019-11-05

## 2019-11-05 PROCEDURE — 99214 OFFICE O/P EST MOD 30 MIN: CPT | Mod: 25

## 2019-11-05 PROCEDURE — 17003 DESTRUCT PREMALG LES 2-14: CPT

## 2019-11-05 PROCEDURE — ? LIQUID NITROGEN

## 2019-11-05 PROCEDURE — ? ADDITIONAL NOTES

## 2019-11-05 PROCEDURE — ? COUNSELING

## 2019-11-05 PROCEDURE — 17000 DESTRUCT PREMALG LESION: CPT

## 2019-11-05 ASSESSMENT — LOCATION SIMPLE DESCRIPTION DERM
LOCATION SIMPLE: LEFT UPPER BACK
LOCATION SIMPLE: LEFT HAND
LOCATION SIMPLE: POSTERIOR NECK
LOCATION SIMPLE: LEFT CALF
LOCATION SIMPLE: RIGHT CALF
LOCATION SIMPLE: LEFT CHEEK
LOCATION SIMPLE: RIGHT POSTERIOR UPPER ARM
LOCATION SIMPLE: RIGHT ANTERIOR NECK
LOCATION SIMPLE: RIGHT CHEEK
LOCATION SIMPLE: LEFT POSTERIOR UPPER ARM
LOCATION SIMPLE: RIGHT UPPER BACK
LOCATION SIMPLE: RIGHT HAND

## 2019-11-05 ASSESSMENT — LOCATION ZONE DERM
LOCATION ZONE: LEG
LOCATION ZONE: TRUNK
LOCATION ZONE: FACE
LOCATION ZONE: NECK
LOCATION ZONE: ARM
LOCATION ZONE: HAND

## 2019-11-05 ASSESSMENT — LOCATION DETAILED DESCRIPTION DERM
LOCATION DETAILED: LEFT SUPERIOR UPPER BACK
LOCATION DETAILED: LEFT INFERIOR CENTRAL MALAR CHEEK
LOCATION DETAILED: LEFT PROXIMAL CALF
LOCATION DETAILED: RIGHT SUPERIOR UPPER BACK
LOCATION DETAILED: RIGHT INFERIOR MEDIAL UPPER BACK
LOCATION DETAILED: RIGHT DISTAL POSTERIOR UPPER ARM
LOCATION DETAILED: LEFT DISTAL POSTERIOR UPPER ARM
LOCATION DETAILED: LEFT RADIAL DORSAL HAND
LOCATION DETAILED: RIGHT PROXIMAL CALF
LOCATION DETAILED: RIGHT INFERIOR ANTERIOR NECK
LOCATION DETAILED: RIGHT RADIAL DORSAL HAND
LOCATION DETAILED: LEFT LATERAL TRAPEZIAL NECK
LOCATION DETAILED: RIGHT INFERIOR CENTRAL MALAR CHEEK

## 2019-11-26 ENCOUNTER — NON-PROVIDER VISIT (OUTPATIENT)
Dept: CARDIOLOGY | Facility: MEDICAL CENTER | Age: 69
End: 2019-11-26
Payer: MEDICARE

## 2019-11-26 ENCOUNTER — TELEPHONE (OUTPATIENT)
Dept: CARDIOLOGY | Facility: MEDICAL CENTER | Age: 69
End: 2019-11-26

## 2019-11-26 DIAGNOSIS — I47.10 SVT (SUPRAVENTRICULAR TACHYCARDIA): ICD-10-CM

## 2019-11-26 DIAGNOSIS — I49.9 IRREGULAR HEARTBEAT: ICD-10-CM

## 2019-12-17 ENCOUNTER — TELEPHONE (OUTPATIENT)
Dept: URGENT CARE | Facility: MEDICAL CENTER | Age: 69
End: 2019-12-17

## 2019-12-17 PROCEDURE — 0298T PR EXT ECG > 48HR TO 21 DAY REVIEW AND INTERPRETATN: CPT | Performed by: INTERNAL MEDICINE

## 2019-12-17 PROCEDURE — 0296T PR EXT ECG > 48HR TO 21 DAY RCRD W/CONECT INTL RCRD: CPT | Performed by: INTERNAL MEDICINE

## 2019-12-18 NOTE — TELEPHONE ENCOUNTER
Pt notified. FV scheduled.   Nicole Nunn  [facility]    ----- Message from Joao Ireland M.D. sent at 12/17/2019 11:55 AM PST -----  Please have patient follow-up to review heart monitor results.  No serious abnormalities.  Joao Ireland M.D.

## 2020-01-02 ENCOUNTER — OFFICE VISIT (OUTPATIENT)
Dept: MEDICAL GROUP | Facility: MEDICAL CENTER | Age: 70
End: 2020-01-02
Payer: MEDICARE

## 2020-01-02 VITALS
OXYGEN SATURATION: 94 % | SYSTOLIC BLOOD PRESSURE: 124 MMHG | WEIGHT: 220 LBS | HEART RATE: 78 BPM | DIASTOLIC BLOOD PRESSURE: 76 MMHG | HEIGHT: 62 IN | BODY MASS INDEX: 40.48 KG/M2 | TEMPERATURE: 97.2 F

## 2020-01-02 DIAGNOSIS — E03.4 HYPOTHYROIDISM DUE TO ACQUIRED ATROPHY OF THYROID: ICD-10-CM

## 2020-01-02 DIAGNOSIS — I47.10 SVT (SUPRAVENTRICULAR TACHYCARDIA) (HCC): ICD-10-CM

## 2020-01-02 DIAGNOSIS — Z11.59 NEED FOR HEPATITIS C SCREENING TEST: ICD-10-CM

## 2020-01-02 DIAGNOSIS — I49.3 VENTRICULAR ECTOPIC BEATS: ICD-10-CM

## 2020-01-02 DIAGNOSIS — I10 ESSENTIAL HYPERTENSION: ICD-10-CM

## 2020-01-02 DIAGNOSIS — N18.30 CKD (CHRONIC KIDNEY DISEASE) STAGE 3, GFR 30-59 ML/MIN: ICD-10-CM

## 2020-01-02 DIAGNOSIS — E78.5 DYSLIPIDEMIA: ICD-10-CM

## 2020-01-02 PROCEDURE — 99214 OFFICE O/P EST MOD 30 MIN: CPT | Performed by: FAMILY MEDICINE

## 2020-01-02 ASSESSMENT — PATIENT HEALTH QUESTIONNAIRE - PHQ9: CLINICAL INTERPRETATION OF PHQ2 SCORE: 0

## 2020-01-02 NOTE — PROGRESS NOTES
Subjective:   Prince Mazariegos is a 69 y.o. female here today for heart palpitations.    Patient had a 14-day heart monitor recently.  She indicated 2 symptomatic events, one was sinus rhythm and the other was ventricular ectopy.  She had 12 runs of short SVT as well.  She is on metoprolol SR 50 mg daily.  Patient was having more ectopic beats but states that it was because of stress at the time.  Her frequency of ectopic beats have decreased.  Overall she is not concerned about her heart palpitations at this time.    Patient is due for annual visit in 4 months and would like to have the labs ordered.  She is tolerating her blood pressure, thyroid and cholesterol medication without noticeable side effects.    Current medicines (including changes today)  Current Outpatient Medications   Medication Sig Dispense Refill   • rosuvastatin (CRESTOR) 10 MG Tab Take 10 mg by mouth every evening.     • acetaminophen (TYLENOL) 325 MG Tab Take 2 Tabs by mouth every 8 hours as needed.     • lisinopril (PRINIVIL, ZESTRIL) 40 MG tablet Take 1 Tab by mouth every day. 90 Tab 3   • levothyroxine (SYNTHROID) 75 MCG Tab Take 1 Tab by mouth every day. 90 Tab 3   • hydroCHLOROthiazide (HYDRODIURIL) 25 MG Tab Take 1 Tab by mouth every day. 90 Tab 3   • metoprolol SR (TOPROL XL) 50 MG TABLET SR 24 HR Take 1 Tab by mouth every day. 90 Tab 3   • Cholecalciferol (VITAMIN D) 2000 UNIT Tab Take 2,000 Units by mouth every evening.     • Probiotic Product (PROBIOTIC-10 PO) Take 1 Tab by mouth every day.     • omeprazole (PRILOSEC) 20 MG delayed-release capsule Take 20 mg by mouth 1 time daily as needed.     • polyethylene glycol 3350 (MIRALAX) Powder Take 17 g by mouth every day.     • multivitamin (THERAGRAN) Tab Take 1 Tab by mouth every evening.     • Fiber Powder Take 1 Each by mouth every day.     • Calcium 500 MG Chew Tab Take 1,000 mg by mouth every evening.       No current facility-administered medications for this visit.      She   "has a past medical history of Anesthesia, Arrhythmia, Arthritis, Back pain, Breath shortness, Chest pain (May 2016), Chickenpox, DDD (degenerative disc disease), cervical, Disorder of thyroid, Enlarged tonsils, GERD (gastroesophageal reflux disease), [place] measles, Hemorrhoids, High cholesterol, Hypertension, Obesity, Pain, PONV (postoperative nausea and vomiting), Psychiatric disorder, Skin cancer, Sleep apnea, and Snoring.    ROS   No chest pain, no shortness of breath       Objective:     /76 (BP Location: Right arm, Patient Position: Sitting)   Pulse 78   Temp 36.2 °C (97.2 °F)   Ht 1.575 m (5' 2\")   Wt 99.8 kg (220 lb)   SpO2 94%  Body mass index is 40.24 kg/m².   Physical Exam:  Constitutional: Alert, no distress.  Skin: Warm, dry, good turgor, no rashes in visible areas.  Eye: Equal, round and reactive, conjunctiva clear, lids normal.  Psych: Alert and oriented x3, normal affect and mood.        Assessment and Plan:   The following treatment plan was discussed    1. Ventricular ectopic beats  New problem.  Reassurance given.  Offered to increase metoprolol if symptomatic.    2. SVT (supraventricular tachycardia) (HCC)  Already on metoprolol.  No symptomatic or prolonged runs.  Discussed Valsalva maneuvers.  If symptoms become more frequent consider increasing metoprolol.    3. CKD (chronic kidney disease) stage 3, GFR 30-59 ml/min ([facility])  Check labs and follow-up in 4 months for annual.  - CBC WITH DIFFERENTIAL; Future  - Comp Metabolic Panel; Future    4. Essential hypertension  Controlled.  Continue current medications. Check labs and follow-up in 4 months for annual.  - CBC WITH DIFFERENTIAL; Future    5. Hypothyroidism due to acquired atrophy of thyroid  Check labs and follow-up in 4 months for annual.  - TSH WITH REFLEX TO FT4; Future    6. Dyslipidemia  Check labs and follow-up in 4 months for annual.  - Comp Metabolic Panel; Future  - Lipid Profile; Future    7. Need for hepatitis C " screening test  Check labs and follow-up in 4 months for annual.  - HCV Scrn ( [id number] 1xLife); Future      Followup: Return in about 4 months (around 2020), or if symptoms worsen or fail to improve, for Annual.

## 2020-01-13 ENCOUNTER — OFFICE VISIT (OUTPATIENT)
Dept: URGENT CARE | Facility: MEDICAL CENTER | Age: 70
End: 2020-01-13
Payer: MEDICARE

## 2020-01-13 VITALS
BODY MASS INDEX: 40.85 KG/M2 | HEART RATE: 61 BPM | HEIGHT: 62 IN | OXYGEN SATURATION: 96 % | WEIGHT: 222 LBS | RESPIRATION RATE: 17 BRPM | TEMPERATURE: 97.1 F | DIASTOLIC BLOOD PRESSURE: 66 MMHG | SYSTOLIC BLOOD PRESSURE: 106 MMHG

## 2020-01-13 DIAGNOSIS — R05.9 COUGH: ICD-10-CM

## 2020-01-13 DIAGNOSIS — J02.9 SORE THROAT: ICD-10-CM

## 2020-01-13 DIAGNOSIS — H66.003 ACUTE SUPPURATIVE OTITIS MEDIA OF BOTH EARS WITHOUT SPONTANEOUS RUPTURE OF TYMPANIC MEMBRANES, RECURRENCE NOT SPECIFIED: ICD-10-CM

## 2020-01-13 DIAGNOSIS — J06.9 UPPER RESPIRATORY TRACT INFECTION, UNSPECIFIED TYPE: ICD-10-CM

## 2020-01-13 PROCEDURE — 99214 OFFICE O/P EST MOD 30 MIN: CPT | Performed by: NURSE PRACTITIONER

## 2020-01-13 RX ORDER — AMOXICILLIN 500 MG/1
500 CAPSULE ORAL 2 TIMES DAILY
Qty: 20 CAP | Refills: 0 | Status: SHIPPED | OUTPATIENT
Start: 2020-01-13 | End: 2020-01-23

## 2020-01-13 RX ORDER — FLUTICASONE PROPIONATE 50 MCG
2 SPRAY, SUSPENSION (ML) NASAL DAILY
Qty: 1 BOTTLE | Refills: 0 | Status: SHIPPED
Start: 2020-01-13 | End: 2020-03-22

## 2020-01-13 RX ORDER — BENZONATATE 100 MG/1
100 CAPSULE ORAL 3 TIMES DAILY PRN
Qty: 40 CAP | Refills: 0 | Status: SHIPPED
Start: 2020-01-13 | End: 2020-03-22

## 2020-01-13 ASSESSMENT — ENCOUNTER SYMPTOMS
FEVER: 0
COUGH: 1
SPUTUM PRODUCTION: 1
DIZZINESS: 0
CHILLS: 0
SHORTNESS OF BREATH: 0
VOMITING: 0
ABDOMINAL PAIN: 0
SINUS PAIN: 0
NAUSEA: 0
SORE THROAT: 1
EYE DISCHARGE: 0
HEADACHES: 0
EYE REDNESS: 0

## 2020-01-13 ASSESSMENT — LIFESTYLE VARIABLES: SUBSTANCE_ABUSE: 0

## 2020-01-13 NOTE — PROGRESS NOTES
"Subjective:      Prince Mazariegos is a 69 y.o. female who presents with Cough (coug, sneezing, sore throat, x1week)    Reviewed past medical, surgical and family history. Reviewed prescription and OTC medications with patient in electronic health record today      Allergies   Allergen Reactions   • Aleve [Gnp Naproxen Sodium]      hives   • Latex Rash   • Naproxen Sodium Hives   • Other Misc Swelling     Some soap & toothpaste puffs, colored laundry soap dryer sheets   • Skelaxin [Metaxalone] Hives   • Sunscreens Hives             HPI This is a new problem.  Coughing x 1 week. Assoc Sx: sneezing, sore throat, otalgia, fatigue. Symptoms are slowly getting worse. .   Treatment tried: nothing.  Wears CPAP at night with humidification.   No other aggravating or alleviating factors.           Review of Systems   Constitutional: Positive for malaise/fatigue. Negative for chills and fever.   HENT: Positive for congestion, ear pain and sore throat. Negative for hearing loss and sinus pain.         Sneezing     Eyes: Negative for discharge and redness.   Respiratory: Positive for cough and sputum production. Negative for shortness of breath.    Gastrointestinal: Negative for abdominal pain, nausea and vomiting.   Neurological: Negative for dizziness and headaches.   Psychiatric/Behavioral: Negative for substance abuse.          Objective:     /66   Pulse 61   Temp 36.2 °C (97.1 °F) (Temporal)   Resp 17   Ht 1.575 m (5' 2\")   Wt 100.7 kg (222 lb)   SpO2 96%   BMI 40.60 kg/m²      Physical Exam  Vitals signs reviewed.   Constitutional:       Appearance: Normal appearance. She is normal weight.   HENT:      Right Ear: A middle ear effusion is present. There is impacted cerumen. Tympanic membrane is erythematous. Tympanic membrane is not perforated.      Left Ear: A middle ear effusion is present. There is impacted cerumen. Tympanic membrane is erythematous. Tympanic membrane is not perforated.      Ears:      " Comments: bilateral Cerumen Impaction 100% occluded.    Removal Procedure:    I have discussed the potential risks of this procedure including but not limited to mild discomfort with a sensation of ear fullness and wetness, dizziness, ear canal inflammation, ear canal abrasion with bleeding, and/or ear drum perforation.Patient is aware and consents to the procedure.  Cerumen removed easily with flushing after use of wax softener  Pt tolerated well. No adverse effects.   Reassessment revealed: normal canal, TM erythematous bilaterally        Nose: Nose normal.      Mouth/Throat:      Mouth: Mucous membranes are moist.   Cardiovascular:      Rate and Rhythm: Normal rate.      Pulses: Normal pulses.   Skin:     General: Skin is warm.      Capillary Refill: Capillary refill takes less than 2 seconds.   Neurological:      Mental Status: She is alert and oriented to person, place, and time.   Psychiatric:         Mood and Affect: Mood normal.         Thought Content: Thought content normal.                 Assessment/Plan:       1. Upper respiratory tract infection, unspecified type  fluticasone (FLONASE) 50 MCG/ACT nasal spray   2. Sore throat  Maalox Plus - Diphenhydramine - Lidocaine (MBX Oral Solution)   3. Cough  benzonatate (TESSALON) 100 MG Cap   4. Acute suppurative otitis media of both ears without spontaneous rupture of tympanic membranes, recurrence not specified  amoxicillin (AMOXIL) 500 MG Cap       OTC antihistamine of choice. Follow manufactures dosing and safety guidelines.     OTC  analgesic of choice. Follow manufactures dosing and safety precautions.     Return to urgent care clinic or PCP 5-7  days if current symptoms are not resolving in a satisfactory manner or sooner if new or worsening symptoms occur. Differential diagnosis, natural history, supportive care, and indications for immediate follow-up discussed at length.   Advised of signs and symptoms which would warrant further evaluation and /or  emergent evaluation in ER.    Verbalized agreement with this treatment plan and seemed to understand without barriers. Questions were encouraged and answered to patients satisfaction.

## 2020-01-13 NOTE — PATIENT INSTRUCTIONS
"Over the counter antihistamine - ( example: Claritin, Zyrtec or Allergra). Do not use a decongestant.       Upper Respiratory Infection, Adult  Most upper respiratory infections (URIs) are a viral infection of the air passages leading to the lungs. A URI affects the nose, throat, and upper air passages. The most common type of URI is nasopharyngitis and is typically referred to as \"the common cold.\"  URIs run their course and usually go away on their own. Most of the time, a URI does not require medical attention, but sometimes a bacterial infection in the upper airways can follow a viral infection. This is called a secondary infection. Sinus and middle ear infections are common types of secondary upper respiratory infections.  Bacterial pneumonia can also complicate a URI. A URI can worsen asthma and chronic obstructive pulmonary disease (COPD). Sometimes, these complications can require emergency medical care and may be life threatening.  What are the causes?  Almost all URIs are caused by viruses. A virus is a type of germ and can spread from one person to another.  What increases the risk?  You may be at risk for a URI if:  · You smoke.  · You have chronic heart or lung disease.  · You have a weakened defense (immune) system.  · You are very young or very old.  · You have nasal allergies or asthma.  · You work in crowded or poorly ventilated areas.  · You work in health care facilities or schools.  What are the signs or symptoms?  Symptoms typically develop 2-3 days after you come in contact with a cold virus. Most viral URIs last 7-10 days. However, viral URIs from the influenza virus (flu virus) can last 14-18 days and are typically more severe. Symptoms may include:  · Runny or stuffy (congested) nose.  · Sneezing.  · Cough.  · Sore throat.  · Headache.  · Fatigue.  · Fever.  · Loss of appetite.  · Pain in your forehead, behind your eyes, and over your cheekbones (sinus pain).  · Muscle aches.  How is this " diagnosed?  Your health care provider may diagnose a URI by:  · Physical exam.  · Tests to check that your symptoms are not due to another condition such as:  ¨ Strep throat.  ¨ Sinusitis.  ¨ Pneumonia.  ¨ Asthma.  How is this treated?  A URI goes away on its own with time. It cannot be cured with medicines, but medicines may be prescribed or recommended to relieve symptoms. Medicines may help:  · Reduce your fever.  · Reduce your cough.  · Relieve nasal congestion.  Follow these instructions at home:  · Take medicines only as directed by your health care provider.  · Gargle warm saltwater or take cough drops to comfort your throat as directed by your health care provider.  · Use a warm mist humidifier or inhale steam from a shower to increase air moisture. This may make it easier to breathe.  · Drink enough fluid to keep your urine clear or pale yellow.  · Eat soups and other clear broths and maintain good nutrition.  · Rest as needed.  · Return to work when your temperature has returned to normal or as your health care provider advises. You may need to stay home longer to avoid infecting others. You can also use a face mask and careful hand washing to prevent spread of the virus.  · Increase the usage of your inhaler if you have asthma.  · Do not use any tobacco products, including cigarettes, chewing tobacco, or electronic cigarettes. If you need help quitting, ask your health care provider.  How is this prevented?  The best way to protect yourself from getting a cold is to practice good hygiene.  · Avoid oral or hand contact with people with cold symptoms.  · Wash your hands often if contact occurs.  There is no clear evidence that vitamin C, vitamin E, echinacea, or exercise reduces the chance of developing a cold. However, it is always recommended to get plenty of rest, exercise, and practice good nutrition.  Contact a health care provider if:  · You are getting worse rather than better.  · Your symptoms are  not controlled by medicine.  · You have chills.  · You have worsening shortness of breath.  · You have brown or red mucus.  · You have yellow or brown nasal discharge.  · You have pain in your face, especially when you bend forward.  · You have a fever.  · You have swollen neck glands.  · You have pain while swallowing.  · You have white areas in the back of your throat.  Get help right away if:  · You have severe or persistent:  ¨ Headache.  ¨ Ear pain.  ¨ Sinus pain.  ¨ Chest pain.  · You have chronic lung disease and any of the following:  ¨ Wheezing.  ¨ Prolonged cough.  ¨ Coughing up blood.  ¨ A change in your usual mucus.  · You have a stiff neck.  · You have changes in your:  ¨ Vision.  ¨ Hearing.  ¨ Thinking.  ¨ Mood.  This information is not intended to replace advice given to you by your health care provider. Make sure you discuss any questions you have with your health care provider.  Document Released: 06/13/2002 Document Revised: 08/20/2017 Document Reviewed: 03/25/2015  Elsevier Interactive Patient Education © 2017 [employer]

## 2020-02-24 ENCOUNTER — OFFICE VISIT (OUTPATIENT)
Dept: MEDICAL GROUP | Facility: MEDICAL CENTER | Age: 70
End: 2020-02-24
Payer: MEDICARE

## 2020-02-24 VITALS
HEIGHT: 62 IN | OXYGEN SATURATION: 96 % | HEART RATE: 70 BPM | SYSTOLIC BLOOD PRESSURE: 128 MMHG | BODY MASS INDEX: 40.6 KG/M2 | TEMPERATURE: 97.7 F | DIASTOLIC BLOOD PRESSURE: 70 MMHG

## 2020-02-24 DIAGNOSIS — H66.005 RECURRENT ACUTE SUPPURATIVE OTITIS MEDIA WITHOUT SPONTANEOUS RUPTURE OF LEFT TYMPANIC MEMBRANE: ICD-10-CM

## 2020-02-24 PROCEDURE — 99214 OFFICE O/P EST MOD 30 MIN: CPT | Performed by: FAMILY MEDICINE

## 2020-02-24 RX ORDER — AMOXICILLIN AND CLAVULANATE POTASSIUM 875; 125 MG/1; MG/1
1 TABLET, FILM COATED ORAL 2 TIMES DAILY
Qty: 20 TAB | Refills: 0 | Status: SHIPPED | OUTPATIENT
Start: 2020-02-24 | End: 2020-03-05

## 2020-02-24 NOTE — PROGRESS NOTES
Subjective:   Prince Mazariegos is a 69 y.o. female here today for cough    Was seen at urgent care 6 weeks ago with otitis media, treated with amoxicillin, symptoms started 1 week prior to urgent care visit. Had an episode of vertigo and ear's are popping and clearing, bilaterally ears. Used Flonase, but discontinued but she did not need it.  Big complaint today is coughing, clear mucus. Overall she is feeling better and coughing has improved. Cough started when diagnosed with URI. Laying flat makes her cough more, but she is able to sleep.    Current medicines (including changes today)  Current Outpatient Medications   Medication Sig Dispense Refill   • amoxicillin-clavulanate (AUGMENTIN) 875-125 MG Tab Take 1 Tab by mouth 2 times a day for 10 days. 20 Tab 0   • Maalox Plus - Diphenhydramine - Lidocaine (MBX Oral Solution) Take 5 mL by mouth every 6 hours as needed. 120 mL 0   • fluticasone (FLONASE) 50 MCG/ACT nasal spray Spray 2 Sprays in nose every day. 1 Bottle 0   • benzonatate (TESSALON) 100 MG Cap Take 1 Cap by mouth 3 times a day as needed for Cough. 40 Cap 0   • rosuvastatin (CRESTOR) 10 MG Tab Take 10 mg by mouth every evening.     • acetaminophen (TYLENOL) 325 MG Tab Take 2 Tabs by mouth every 8 hours as needed.     • lisinopril (PRINIVIL, ZESTRIL) 40 MG tablet Take 1 Tab by mouth every day. 90 Tab 3   • levothyroxine (SYNTHROID) 75 MCG Tab Take 1 Tab by mouth every day. 90 Tab 3   • hydroCHLOROthiazide (HYDRODIURIL) 25 MG Tab Take 1 Tab by mouth every day. 90 Tab 3   • metoprolol SR (TOPROL XL) 50 MG TABLET SR 24 HR Take 1 Tab by mouth every day. 90 Tab 3   • Cholecalciferol (VITAMIN D) 2000 UNIT Tab Take 2,000 Units by mouth every evening.     • Probiotic Product (PROBIOTIC-10 PO) Take 1 Tab by mouth every day.     • omeprazole (PRILOSEC) 20 MG delayed-release capsule Take 20 mg by mouth 1 time daily as needed.     • polyethylene glycol 3350 (MIRALAX) Powder Take 17 g by mouth every day.     •  "multivitamin (THERAGRAN) Tab Take 1 Tab by mouth every evening.     • Fiber Powder Take 1 Each by mouth every day.     • Calcium 500 MG Chew Tab Take 1,000 mg by mouth every evening.       No current facility-administered medications for this visit.      She  has a past medical history of Anesthesia, Arrhythmia, Arthritis, Back pain, Breath shortness, Chest pain (May 2016), Chickenpox, DDD (degenerative disc disease), cervical, Disorder of thyroid, Enlarged tonsils, GERD (gastroesophageal reflux disease), Armenian measles, Hemorrhoids, High cholesterol, Hypertension, Obesity, Pain, PONV (postoperative nausea and vomiting), Psychiatric disorder, Skin cancer, Sleep apnea, and Snoring.    ROS   + rhinorrhea, + sneezing, no GERD, no wheezing.       Objective:     /70 (BP Location: Right arm, Patient Position: Sitting, BP Cuff Size: Small infant)   Pulse 70   Temp 36.5 °C (97.7 °F) (Temporal)   Ht 1.575 m (5' 2\")   SpO2 96%  Body mass index is 40.6 kg/m².   Physical Exam:  Constitutional: Alert, no distress.  Skin: Warm, dry, good turgor, no rashes in visible areas.  Eye: Equal, round and reactive, conjunctiva clear, lids normal.  ENMT: Lips without lesions, good dentition, oropharynx clear.  Left TM with erythema and purulence, membrane intact.  Neck: Trachea midline, no masses, no thyromegaly. No cervical or supraclavicular lymphadenopathy  Respiratory: Unlabored respiratory effort, lungs clear to auscultation, no wheezes, no ronchi.  Cardiovascular: Normal S1, S2, no murmur, no edema.  Psych: Alert and oriented x3, normal affect and mood.        Assessment and Plan:   The following treatment plan was discussed    1. Recurrent acute suppurative otitis media without spontaneous rupture of left tympanic membrane  New problem.  Start patient on Augmentin and advised patient to use Flonase.  Follow-up if no improvement.      Followup: Return if symptoms worsen or fail to improve.         "

## 2020-03-03 ENCOUNTER — TELEPHONE (OUTPATIENT)
Dept: MEDICAL GROUP | Facility: MEDICAL CENTER | Age: 70
End: 2020-03-03

## 2020-03-03 RX ORDER — DOXYCYCLINE HYCLATE 100 MG
100 TABLET ORAL 2 TIMES DAILY
Qty: 14 TAB | Refills: 0 | Status: SHIPPED | OUTPATIENT
Start: 2020-03-03 | End: 2020-03-10

## 2020-03-03 NOTE — TELEPHONE ENCOUNTER
Patient reports that the augmentin that was prescribed has given her diarrhea. She reports not taking it yet today and diarrhea has begun to clear up. Please advise.

## 2020-03-03 NOTE — TELEPHONE ENCOUNTER
Please have her discontinue Augmentin and I have sent in a different antibiotic for doxycycline.  Joao Ireland M.D.

## 2020-03-04 ENCOUNTER — SLEEP CENTER VISIT (OUTPATIENT)
Dept: SLEEP MEDICINE | Facility: MEDICAL CENTER | Age: 70
End: 2020-03-04
Payer: MEDICARE

## 2020-03-04 VITALS
HEIGHT: 62 IN | DIASTOLIC BLOOD PRESSURE: 66 MMHG | WEIGHT: 224 LBS | BODY MASS INDEX: 41.22 KG/M2 | SYSTOLIC BLOOD PRESSURE: 128 MMHG | OXYGEN SATURATION: 97 % | HEART RATE: 70 BPM

## 2020-03-04 DIAGNOSIS — G47.33 OSA (OBSTRUCTIVE SLEEP APNEA): ICD-10-CM

## 2020-03-04 DIAGNOSIS — E66.01 MORBID OBESITY WITH BMI OF 40.0-44.9, ADULT (HCC): ICD-10-CM

## 2020-03-04 PROCEDURE — 99214 OFFICE O/P EST MOD 30 MIN: CPT | Performed by: NURSE PRACTITIONER

## 2020-03-04 ASSESSMENT — FIBROSIS 4 INDEX: FIB4 SCORE: 1.07

## 2020-03-04 NOTE — PROGRESS NOTES
CC:  Here for f/u sleep issues as listed below    HPI:   Prince presents today for follow up obstructive apnea. PMH of HTN, anxiety. She is currently in PT for pain r/t neck, knee, shoulder.      HSS from June 2016 showed an AHI of 12.2 with low oxygenation of 76%.  Currently she is being treated with CPAP @ [id number]. Compliance download from the dates 2/3/2020 - 3/3/2020 indicates she is wearing the device 100% for an avg of 9 hours and 28 minutes per night with a reduced AHI of 0.4.  She does tolerate pressure and mask well.  She wakes up refreshed and denies morning H/A. she sleeps better overall and not not naping like she used to.  She will continue to clean supplies weekly and change them as insurance allows.       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 She has not used it, but happy to have it on hand.  BMI is 40. Completing PT exercises frequently since reversal shoulder surgery.            Patient Active Problem List    Diagnosis Date Noted   • HTN (hypertension) 11/11/2013     Priority: High   • Dyslipidemia 09/30/2013     Priority: High   • CKD (chronic kidney disease) stage 3, GFR 30-59 ml/min ([facility]) 09/16/2015     Priority: Medium   • Chronic lower back pain 09/16/2015     Priority: Medium   • Chronic pain of right knee 09/16/2015     Priority: Medium   • GERD (gastroesophageal reflux disease) 09/30/2013     Priority: Medium   • Hypothyroid 09/30/2013     Priority: Medium   • SVT (supraventricular tachycardia) ([facility]) 01/02/2020   • Ventricular ectopic beats 10/31/2019   • Right foot pain 06/04/2018   • Rosacea 05/18/2018   • GENIA (obstructive sleep apnea) 04/10/2017   • Morbid obesity with BMI of 40.0-44.9, adult ([facility]) 03/14/2017   • Cervical spine pain 06/02/2016   • Thoracic spine pain 06/02/2016       Past Medical History:   Diagnosis Date   • Anesthesia     PONV   • Arrhythmia    • Arthritis     osteo neck  right knee back right shoulder   • Back pain    • Breath shortness     related to pain   • Chest pain May 2016    PET scan with no infarct or ischemia, normal LVEF.   • Chickenpox    • DDD (degenerative disc disease), cervical    • Disorder of thyroid    • Enlarged tonsils     Removed with adenoids   • GERD (gastroesophageal reflux disease)    • Serbian measles    • Hemorrhoids     bleeding   • High cholesterol    • Hypertension     pt states well controlled on meds   • Obesity    • Pain     right shoulder/ torn rotator cuff   • PONV (postoperative nausea and vomiting)    • Psychiatric disorder     Anxiety  not taking any medications   • Skin cancer     skin   • Sleep apnea     uses cpap   • Snoring        Past Surgical History:   Procedure Laterality Date   • PB RECONSTR TOTAL SHOULDER IMPLANT Right 8/14/2019    Procedure: ARTHROPLASTY, SHOULDER, TOTAL- REVERSE;  Surgeon: Parish Garcia M.D.;  Location: SURGERY [facility];  Service: Orthopedics   • CARPAL TUNNEL ENDOSCOPIC  10/28/2011    Performed by EDGAR GARCIA at SURGERY SAME DAY [facility] ORS   • CARPAL TUNNEL ENDOSCOPIC  10/17/2011    Performed by EDGAR GARCIA at SURGERY SAME DAY [facility] ORS   • HYSTEROSCOPY WITH VIDEO DIAGNOSTIC  10/7/08    Performed by MICHELINE WEBER at SURGERY SAME DAY [facility] ORS   • DILATION AND CURETTAGE  10/7/08    Performed by MICHELINE WEBER at SURGERY SAME DAY [facility] ORS   • LUMPECTOMY Right 2007    Breast lump removed   • CARPAL TUNNEL RELEASE     • OTHER      hemorrhoid banding   • OTHER CARDIAC SURGERY      Cath - 4-5 years ago with ba   • TONSILLECTOMY         Family History   Problem Relation Age of Onset   • Cancer Mother         Breast cancer   • Cancer Sister         Breast cancer   • Cancer Other    • Cancer Paternal Grandfather         liver   • Cancer Father         Pancreatic cancer   • Cancer Maternal Aunt    • Heart Disease Neg Hx        Social History     Socioeconomic  History   • Marital status: Single     Spouse name: Not on file   • Number of children: Not on file   • Years of education: Not on file   • Highest education level: Not on file   Occupational History   • Not on file   Social Needs   • Financial resource strain: Not on file   • Food insecurity     Worry: Not on file     Inability: Not on file   • Transportation needs     Medical: Not on file     Non-medical: Not on file   Tobacco Use   • Smoking status: Never Smoker   • Smokeless tobacco: Never Used   Substance and Sexual Activity   • Alcohol use: No     Alcohol/week: 0.0 oz   • Drug use: No   • Sexual activity: Not on file   Lifestyle   • Physical activity     Days per week: Not on file     Minutes per session: Not on file   • Stress: Not on file   Relationships   • Social connections     Talks on phone: Not on file     Gets together: Not on file     Attends [employer] service: Not on file     Active member of club or organization: Not on file     Attends meetings of clubs or organizations: Not on file     Relationship status: Not on file   • Intimate partner violence     Fear of current or ex partner: Not on file     Emotionally abused: Not on file     Physically abused: Not on file     Forced sexual activity: Not on file   Other Topics Concern   • Not on file   Social History Narrative   • Not on file       Current Outpatient Medications   Medication Sig Dispense Refill   • doxycycline (VIBRAMYCIN) 100 MG Tab Take 1 Tab by mouth 2 times a day for 7 days. 14 Tab 0   • fluticasone (FLONASE) 50 MCG/ACT nasal spray Spray 2 Sprays in nose every day. 1 Bottle 0   • benzonatate (TESSALON) 100 MG Cap Take 1 Cap by mouth 3 times a day as needed for Cough. 40 Cap 0   • rosuvastatin (CRESTOR) 10 MG Tab Take 10 mg by mouth every evening.     • acetaminophen (TYLENOL) 325 MG Tab Take 2 Tabs by mouth every 8 hours as needed.     • lisinopril (PRINIVIL, ZESTRIL) 40 MG tablet Take 1 Tab by mouth every day. 90 Tab 3   •  "levothyroxine (SYNTHROID) 75 MCG Tab Take 1 Tab by mouth every day. 90 Tab 3   • hydroCHLOROthiazide (HYDRODIURIL) 25 MG Tab Take 1 Tab by mouth every day. 90 Tab 3   • metoprolol SR (TOPROL XL) 50 MG TABLET SR 24 HR Take 1 Tab by mouth every day. 90 Tab 3   • Probiotic Product (PROBIOTIC-10 PO) Take 1 Tab by mouth every day.     • omeprazole (PRILOSEC) 20 MG delayed-release capsule Take 20 mg by mouth 1 time daily as needed.     • polyethylene glycol 3350 (MIRALAX) Powder Take 17 g by mouth every day.     • Fiber Powder Take 1 Each by mouth every day.     • amoxicillin-clavulanate (AUGMENTIN) 875-125 MG Tab Take 1 Tab by mouth 2 times a day for 10 days. (Patient not taking: Reported on 3/4/2020) 20 Tab 0   • Maalox Plus - Diphenhydramine - Lidocaine (MBX Oral Solution) Take 5 mL by mouth every 6 hours as needed. 120 mL 0   • Cholecalciferol (VITAMIN D) 2000 UNIT Tab Take 2,000 Units by mouth every evening.     • multivitamin (THERAGRAN) Tab Take 1 Tab by mouth every evening.     • Calcium 500 MG Chew Tab Take 1,000 mg by mouth every evening.       No current facility-administered medications for this visit.           Allergies: Aleve [gnp naproxen sodium]; Latex; Naproxen sodium; Other misc; Skelaxin [metaxalone]; and Sunscreens      ROS   Gen: Denies fever, chills, unintentional weight loss, fatigue  Resp:Denies Dyspnea  CV: Denies chest pain, chest tightness  Sleep:Denies morning headache, insomnia, daytime somnolence, snoring, gasping for air, apnea  Neuro: Denies frequent headaches, weakness, dizziness  See HPI.  All other systems reviewed and negative        Vital signs for this encounter:  Vitals:    03/04/20 1306   Height: 1.575 m (5' 2\")   Weight: 101.6 kg (224 lb)   Weight % change since last entry.: 0 %   BP: 128/66   Pulse: 70   BMI (Calculated): 40.97                   Physical Exam:   Gen:         Alert and oriented, No apparent distress.   Neck:        No Lymphadenopathy.  Lungs:     Clear to " auscultation bilaterally.    CV:          Regular rate and rhythm. No murmurs, rubs or gallops.   Abd:         Soft non tender, non distended.            Ext:          No clubbing, cyanosis, edema.    Assessment   1. Morbid obesity with BMI of 40.0-44.9, adult ([facility])  OBESITY COUNSELING (No Charge): Patient identified as having weight management issue.  Appropriate orders and counseling given.   2. GENIA (obstructive sleep apnea)  DME Mask and Supplies       Patient is clinically stable and will proceed with following plan.  Face-to-face time greater than 50% of 25 minutes reviewing: Compliance, weight management.,  Questions and concerns answered regarding her machine.  She is likely eligible for a new machine next year.  She continues to have complete physical therapy for reversal surgery since last office visit.    PLAN:   Patient Instructions   1) Continue CPAP at [id number]  2) Clean mask and supplies weekly and change them as insurance allows   3) Rescue inhaler for dyspnea  4) Vaccines: Up to date with Pneumovax 23 and flu  5) Light conditioning and dietary changes encouraged  6) Return in about 1 year (around 3/4/2021) for follow up with JD Luke, Compliance.

## 2020-03-22 ENCOUNTER — HOSPITAL ENCOUNTER (EMERGENCY)
Facility: MEDICAL CENTER | Age: 70
End: 2020-03-22
Attending: EMERGENCY MEDICINE
Payer: MEDICARE

## 2020-03-22 ENCOUNTER — APPOINTMENT (OUTPATIENT)
Dept: RADIOLOGY | Facility: MEDICAL CENTER | Age: 70
End: 2020-03-22
Attending: EMERGENCY MEDICINE
Payer: MEDICARE

## 2020-03-22 VITALS
SYSTOLIC BLOOD PRESSURE: 111 MMHG | BODY MASS INDEX: 41.79 KG/M2 | HEART RATE: 63 BPM | WEIGHT: 227.07 LBS | DIASTOLIC BLOOD PRESSURE: 62 MMHG | RESPIRATION RATE: 18 BRPM | TEMPERATURE: 97 F | OXYGEN SATURATION: 97 % | HEIGHT: 62 IN

## 2020-03-22 DIAGNOSIS — R07.9 CHEST PAIN, UNSPECIFIED TYPE: ICD-10-CM

## 2020-03-22 DIAGNOSIS — M54.9 PAIN, UPPER BACK: ICD-10-CM

## 2020-03-22 LAB
ALBUMIN SERPL BCP-MCNC: 4.1 G/DL (ref 3.2–4.9)
ALBUMIN/GLOB SERPL: 1.6 G/DL
ALP SERPL-CCNC: 114 U/L (ref 30–99)
ALT SERPL-CCNC: 13 U/L (ref 2–50)
ANION GAP SERPL CALC-SCNC: 14 MMOL/L (ref 7–16)
APPEARANCE UR: CLEAR
AST SERPL-CCNC: 17 U/L (ref 12–45)
BASOPHILS # BLD AUTO: 0.7 % (ref 0–1.8)
BASOPHILS # BLD: 0.06 K/UL (ref 0–0.12)
BILIRUB SERPL-MCNC: 0.4 MG/DL (ref 0.1–1.5)
BILIRUB UR QL STRIP.AUTO: NEGATIVE
BUN SERPL-MCNC: 23 MG/DL (ref 8–22)
CALCIUM SERPL-MCNC: 10.1 MG/DL (ref 8.4–10.2)
CHLORIDE SERPL-SCNC: 98 MMOL/L (ref 96–112)
CO2 SERPL-SCNC: 26 MMOL/L (ref 20–33)
COLOR UR: YELLOW
CREAT SERPL-MCNC: 1.27 MG/DL (ref 0.5–1.4)
EKG IMPRESSION: NORMAL
EOSINOPHIL # BLD AUTO: 0.26 K/UL (ref 0–0.51)
EOSINOPHIL NFR BLD: 3.1 % (ref 0–6.9)
ERYTHROCYTE [DISTWIDTH] IN BLOOD BY AUTOMATED COUNT: 44.7 FL (ref 35.9–50)
GLOBULIN SER CALC-MCNC: 2.6 G/DL (ref 1.9–3.5)
GLUCOSE SERPL-MCNC: 126 MG/DL (ref 65–99)
GLUCOSE UR STRIP.AUTO-MCNC: NEGATIVE MG/DL
HCT VFR BLD AUTO: 37.3 % (ref 37–47)
HGB BLD-MCNC: 12.2 G/DL (ref 12–16)
IMM GRANULOCYTES # BLD AUTO: 0.04 K/UL (ref 0–0.11)
IMM GRANULOCYTES NFR BLD AUTO: 0.5 % (ref 0–0.9)
KETONES UR STRIP.AUTO-MCNC: NEGATIVE MG/DL
LEUKOCYTE ESTERASE UR QL STRIP.AUTO: NEGATIVE
LYMPHOCYTES # BLD AUTO: 0.97 K/UL (ref 1–4.8)
LYMPHOCYTES NFR BLD: 11.5 % (ref 22–41)
MCH RBC QN AUTO: 30.3 PG (ref 27–33)
MCHC RBC AUTO-ENTMCNC: 32.7 G/DL (ref 33.6–35)
MCV RBC AUTO: 92.6 FL (ref 81.4–97.8)
MICRO URNS: NORMAL
MONOCYTES # BLD AUTO: 0.52 K/UL (ref 0–0.85)
MONOCYTES NFR BLD AUTO: 6.1 % (ref 0–13.4)
NEUTROPHILS # BLD AUTO: 6.62 K/UL (ref 2–7.15)
NEUTROPHILS NFR BLD: 78.1 % (ref 44–72)
NITRITE UR QL STRIP.AUTO: NEGATIVE
NRBC # BLD AUTO: 0 K/UL
NRBC BLD-RTO: 0 /100 WBC
PH UR STRIP.AUTO: 5 [PH] (ref 5–8)
PLATELET # BLD AUTO: 229 K/UL (ref 164–446)
PMV BLD AUTO: 9.8 FL (ref 9–12.9)
POTASSIUM SERPL-SCNC: 4 MMOL/L (ref 3.6–5.5)
PROT SERPL-MCNC: 6.7 G/DL (ref 6–8.2)
PROT UR QL STRIP: NEGATIVE MG/DL
RBC # BLD AUTO: 4.03 M/UL (ref 4.2–5.4)
RBC UR QL AUTO: NEGATIVE
SODIUM SERPL-SCNC: 138 MMOL/L (ref 135–145)
SP GR UR STRIP.AUTO: <=1.005
TROPONIN T SERPL-MCNC: 11 NG/L (ref 6–19)
TROPONIN T SERPL-MCNC: 9 NG/L (ref 6–19)
WBC # BLD AUTO: 8.5 K/UL (ref 4.8–10.8)

## 2020-03-22 PROCEDURE — 85025 COMPLETE CBC W/AUTO DIFF WBC: CPT

## 2020-03-22 PROCEDURE — 81003 URINALYSIS AUTO W/O SCOPE: CPT

## 2020-03-22 PROCEDURE — 99284 EMERGENCY DEPT VISIT MOD MDM: CPT

## 2020-03-22 PROCEDURE — 80053 COMPREHEN METABOLIC PANEL: CPT

## 2020-03-22 PROCEDURE — 71045 X-RAY EXAM CHEST 1 VIEW: CPT

## 2020-03-22 PROCEDURE — 84484 ASSAY OF TROPONIN QUANT: CPT | Mod: 91

## 2020-03-22 PROCEDURE — 93005 ELECTROCARDIOGRAM TRACING: CPT | Performed by: EMERGENCY MEDICINE

## 2020-03-22 RX ORDER — HYDROCHLOROTHIAZIDE 25 MG/1
25 TABLET ORAL EVERY EVENING
COMMUNITY
End: 2020-05-29 | Stop reason: SDUPTHER

## 2020-03-22 ASSESSMENT — HEART SCORE
HISTORY: SLIGHTLY SUSPICIOUS
HEART SCORE: 3
RISK FACTORS: 1-2 RISK FACTORS
ECG: NORMAL
AGE: >64
TROPONIN: LESS THAN OR EQUAL TO NORMAL LIMIT

## 2020-03-22 ASSESSMENT — PAIN DESCRIPTION - DESCRIPTORS: DESCRIPTORS: BURNING

## 2020-03-22 ASSESSMENT — FIBROSIS 4 INDEX: FIB4 SCORE: 1.07

## 2020-03-22 NOTE — ED PROVIDER NOTES
ED Provider Note    CHIEF COMPLAINT  Chief Complaint   Patient presents with   • Back Pain     rt sided   • Chest Pain     rt sided       HPI  Prince Mazariegos is a 69 y.o. female who presents with right-sided back pain and chest pain.  She has been experiencing right mid back dull discomfort for the last few days which does not seem to be worsened by movement.  She had a right shoulder replacement 7 months ago and is in physical therapy now but her exercises do not seem to exacerbate her back pain.  This morning she was concerned because she had the pain in her back but it radiated to the front and was more of a burning sensation in the front of her chest.  This lasted for about 10 minutes.  She has never had that sensation before she had no associated shortness of breath or nausea.  She was most concerned about her heart given this chest pain sensation.  She denies any fevers or chills.  She has a history of hypertension no history of diabetes.  She took Tylenol for this discomfort last night which seemed to relieve the discomfort.      REVIEW OF SYSTEMS  positive for back pain chest pain, negative for shortness of breath nausea. All other systems are negative.     PAST MEDICAL HISTORY   has a past medical history of Anesthesia, Arrhythmia, Arthritis, Back pain, Breath shortness, Chest pain (May 2016), Chickenpox, DDD (degenerative disc disease), cervical, Disorder of thyroid, Enlarged tonsils, GERD (gastroesophageal reflux disease), Greek measles, Hemorrhoids, High cholesterol, Hypertension, Obesity, Pain, PONV (postoperative nausea and vomiting), Psychiatric disorder, Skin cancer, Sleep apnea, and Snoring.    SOCIAL HISTORY  Social History     Tobacco Use   • Smoking status: Never Smoker   • Smokeless tobacco: Never Used   Substance and Sexual Activity   • Alcohol use: No     Alcohol/week: 0.0 oz   • Drug use: No   • Sexual activity: Not on file       SURGICAL HISTORY   has a past surgical history that  "includes lumpectomy (Right, 2007); tonsillectomy; other cardiac surgery; other; hysteroscopy with video diagnostic (10/7/08); dilation and curettage (10/7/08); carpal tunnel endoscopic (10/17/2011); carpal tunnel endoscopic (10/28/2011); carpal tunnel release; and reconstr total shoulder implant (Right, 8/14/2019).    CURRENT MEDICATIONS  Home Medications     Reviewed by Mary Hamm (Pharmacy Tech) on 03/22/20 at 1206  Med List Status: Complete   Medication Last Dose Status   acetaminophen (TYLENOL) 325 MG Tab 3/22/2020 Active   CALCIUM PO 3/21/2020 Active   Cholecalciferol (VITAMIN D) 2000 UNIT Tab 3/21/2020 Active   Fiber Powder 3/21/2020 Active   hydroCHLOROthiazide (HYDRODIURIL) 25 MG Tab 3/21/2020 Active   levothyroxine (SYNTHROID) 75 MCG Tab 3/22/2020 Active   lisinopril (PRINIVIL, ZESTRIL) 40 MG tablet 3/22/2020 Active   metoprolol SR (TOPROL XL) 50 MG TABLET SR 24 HR 3/22/2020 Active   multivitamin (THERAGRAN) Tab 3/21/2020 Active   polyethylene glycol 3350 (MIRALAX) Powder 3/22/2020 Active   Probiotic Product (PROBIOTIC-10 PO) 3/21/2020 Active   rosuvastatin (CRESTOR) 10 MG Tab 3/21/2020 Active                ALLERGIES  Allergies   Allergen Reactions   • Aleve [Gnp Naproxen Sodium]      hives   • Latex Rash   • Naproxen Sodium Hives   • Other Misc Swelling     Some soap & toothpaste puffs, colored laundry soap dryer sheets   • Skelaxin [Metaxalone] Hives   • Sunscreens Hives       PHYSICAL EXAM  VITAL SIGNS: /62   Pulse 63   Temp 36.1 °C (97 °F) (Temporal)   Resp 18   Ht 1.575 m (5' 2\")   Wt 103 kg (227 lb 1.2 oz)   SpO2 97%   BMI 41.53 kg/m² .  Constitutional: Alert in no apparent distress.  HENT: No signs of trauma, Bilateral external ears normal, Nose normal.   Eyes: Pupils are equal and reactive, Conjunctiva normal, Non-icteric.   Neck: Normal range of motion, No tenderness, Supple, No stridor.   Cardiovascular: Regular rate and rhythm, no murmurs.   Thorax & Lungs: Normal breath " sounds, No respiratory distress, No wheezing, No chest tenderness.   Abdomen: Bowel sounds normal, Soft, No tenderness, No masses, No peritoneal signs.  Skin: Warm, Dry, No erythema, No rash.   Back: No bony tenderness, no rashes  Musculoskeletal:  no major deformities noted.   Neurologic: Alert,  No focal deficits noted.   Psychiatric: Affect normal, Judgment normal, Mood normal.       DIAGNOSTIC STUDIES / PROCEDURES    Results for orders placed or performed during the hospital encounter of 20   EKG   Result Value Ref Range    Report       [facility] Emergency Dept.    Test Date:  2020  Pt Name:    DAVID AGUIRRE                    Department: [facility]  MRN:        [id number]                      [facility]  Gender:     Female                       Technician: HRR  :        1950                   Requested By:THOMPSON SAPP  Order #:    [id number]                    Reading MD: THOMPSON SAPP    Measurements  Intervals                                Axis  Rate:       58                           P:          61  RI:         202                          QRS:        26  QRSD:       120                          T:          15  QT:         415  QTc:        408    Interpretive Statements  Sinus rhythm  Atrial premature complex  Nonspecific intraventricular conduction delay  Compared to ECG 2019 08:43:02  Atrial premature complex(es) now present  Intraventricular conduction delay now present  Electronically Signed On 3- 11:54:05 PDT by THOMPSON SPAP           LABS  Labs Reviewed   CBC WITH DIFFERENTIAL - Abnormal; Notable for the following components:       Result Value    RBC 4.03 (*)     MCHC 32.7 (*)     Neutrophils-Polys 78.10 (*)     Lymphocytes 11.50 (*)     Lymphs (Absolute) 0.97 (*)     All other components within normal limits   COMP METABOLIC PANEL - Abnormal; Notable for the following components:    Glucose 126 (*)     Bun 23 (*)     Alkaline Phosphatase  114 (*)     All other components within normal limits   ESTIMATED GFR - Abnormal; Notable for the following components:    GFR If  51 (*)     GFR If Non  42 (*)     All other components within normal limits   URINALYSIS,CULTURE IF INDICATED   TROPONIN   TROPONIN       RADIOLOGY  DX-CHEST-PORTABLE (1 VIEW)   Final Result      No evidence of acute cardiopulmonary process.              COURSE & MEDICAL DECISION MAKING  Pertinent Labs & Imaging studies reviewed. (See chart for details)    This is a 69-year-old female who presents with right-sided back pain and chest pain.  Patient was mostly concerned about her chest pain this morning that lasted 10 minutes.  Differential includes but is not limited to ACS, musculoskeletal pain, kidney stone, pyonephritis.  Basic labs and urine will be obtained chest x-ray will be obtained EKG does not appear to have any evidence of ischemia.    Initial labs were normal including a normal troponin.  X-ray does not show any evidence of disease.  Delta troponin was performed and is once again normal.  Patient's heart score is 3.  Given her negative work-up here I do think it is reasonable to have her follow-up with her primary care doctor for further cardiac evaluation.  Patient is agreeable to this plan will be discharged.       The patient will return for new or worsening symptoms and is stable at the time of discharge. Patient was given return precautions. Patient and/or family member verbalizes understanding and will comply.    DISPOSITION:  Patient will be discharged home in stable condition.    FOLLOW UP:  Joao Ireland M.D.  [address] [facility] [id number]  [place] [id number] [phone]    Schedule an appointment as soon as possible for a visit in 1 week      [facility], Emergency Dept  [address] [place] [id number] [phone]    Worsening pain, difficulty breathing or other concerns`      OUTPATIENT  MEDICATIONS:  Discharge Medication List as of 3/22/2020  2:55 PM              FINAL IMPRESSION  1. Chest pain, unspecified type     2. Pain, upper back         2.   3.    This dictation has been creating using voice recognition software. The accuracy of the dictation is limited the abilities of the software.  I expect there may be some errors of grammar and possibly content. I made every attempt to manually correct the errors within my dictation. However errors related to this voice recognition software may still exist and should be interpreted within the appropriate context.      The note accurately reflects work and decisions made by me.  Ella Bal M.D.  3/22/2020  5:09 PM

## 2020-03-22 NOTE — ED TRIAGE NOTES
Pt to er with c/o rt sided chest and back pain since yesterday, decreased with tylenol po. Pt in no apparent distress, ekg in triage=no stemi

## 2020-05-27 SDOH — ECONOMIC STABILITY: HOUSING INSECURITY
IN THE LAST 12 MONTHS, WAS THERE A TIME WHEN YOU DID NOT HAVE A STEADY PLACE TO SLEEP OR SLEPT IN A SHELTER (INCLUDING NOW)?: NO

## 2020-05-27 SDOH — ECONOMIC STABILITY: HOUSING INSECURITY: IN THE LAST 12 MONTHS, HOW MANY PLACES HAVE YOU LIVED?: 1

## 2020-05-27 SDOH — ECONOMIC STABILITY: TRANSPORTATION INSECURITY
IN THE PAST 12 MONTHS, HAS LACK OF RELIABLE TRANSPORTATION KEPT YOU FROM MEDICAL APPOINTMENTS, MEETINGS, WORK OR FROM GETTING THINGS NEEDED FOR DAILY LIVING?: NO

## 2020-05-27 SDOH — HEALTH STABILITY: PHYSICAL HEALTH: ON AVERAGE, HOW MANY DAYS PER WEEK DO YOU ENGAGE IN MODERATE TO STRENUOUS EXERCISE (LIKE A BRISK WALK)?: 7 DAYS

## 2020-05-27 SDOH — ECONOMIC STABILITY: INCOME INSECURITY: IN THE LAST 12 MONTHS, WAS THERE A TIME WHEN YOU WERE NOT ABLE TO PAY THE MORTGAGE OR RENT ON TIME?: NO

## 2020-05-27 SDOH — ECONOMIC STABILITY: TRANSPORTATION INSECURITY
IN THE PAST 12 MONTHS, HAS THE LACK OF TRANSPORTATION KEPT YOU FROM MEDICAL APPOINTMENTS OR FROM GETTING MEDICATIONS?: NO

## 2020-05-27 SDOH — HEALTH STABILITY: MENTAL HEALTH
STRESS IS WHEN SOMEONE FEELS TENSE, NERVOUS, ANXIOUS, OR CAN'T SLEEP AT NIGHT BECAUSE THEIR MIND IS TROUBLED. HOW STRESSED ARE YOU?: NOT AT ALL

## 2020-05-27 SDOH — HEALTH STABILITY: PHYSICAL HEALTH: ON AVERAGE, HOW MANY MINUTES DO YOU ENGAGE IN EXERCISE AT THIS LEVEL?: 60 MINUTES

## 2020-05-27 ASSESSMENT — SOCIAL DETERMINANTS OF HEALTH (SDOH)
WITHIN THE PAST 12 MONTHS, YOU WORRIED THAT YOUR FOOD WOULD RUN OUT BEFORE YOU GOT THE MONEY TO BUY MORE: NEVER TRUE
ARE YOU MARRIED, WIDOWED, DIVORCED, SEPARATED, NEVER MARRIED, OR LIVING WITH A PARTNER?: NEVER MARRIED
IN A TYPICAL WEEK, HOW MANY TIMES DO YOU TALK ON THE PHONE WITH FAMILY, FRIENDS, OR NEIGHBORS?: MORE THAN THREE TIMES A WEEK
WITHIN THE PAST 12 MONTHS, THE FOOD YOU BOUGHT JUST DIDN'T LAST AND YOU DIDN'T HAVE MONEY TO GET MORE: NEVER TRUE
DO YOU BELONG TO ANY CLUBS OR ORGANIZATIONS SUCH AS CHURCH GROUPS UNIONS, FRATERNAL OR ATHLETIC GROUPS, OR SCHOOL GROUPS?: YES
HOW OFTEN DO YOU GET TOGETHER WITH FRIENDS OR RELATIVES?: THREE TIMES A WEEK
HOW HARD IS IT FOR YOU TO PAY FOR THE VERY BASICS LIKE FOOD, HOUSING, MEDICAL CARE, AND HEATING?: NOT HARD AT ALL
HOW OFTEN DO YOU HAVE A DRINK CONTAINING ALCOHOL: NEVER
HOW OFTEN DO YOU ATTENT MEETINGS OF THE CLUB OR ORGANIZATION YOU BELONG TO?: MORE THAN 4 TIMES PER YEAR
HOW OFTEN DO YOU HAVE SIX OR MORE DRINKS ON ONE OCCASION: NEVER
HOW OFTEN DO YOU ATTEND CHURCH OR RELIGIOUS SERVICES?: NEVER

## 2020-05-28 ENCOUNTER — HOSPITAL ENCOUNTER (OUTPATIENT)
Dept: LAB | Facility: MEDICAL CENTER | Age: 70
End: 2020-05-28
Attending: FAMILY MEDICINE
Payer: MEDICARE

## 2020-05-28 DIAGNOSIS — E78.5 DYSLIPIDEMIA: ICD-10-CM

## 2020-05-28 DIAGNOSIS — E03.4 HYPOTHYROIDISM DUE TO ACQUIRED ATROPHY OF THYROID: ICD-10-CM

## 2020-05-28 DIAGNOSIS — N18.30 CKD (CHRONIC KIDNEY DISEASE) STAGE 3, GFR 30-59 ML/MIN: ICD-10-CM

## 2020-05-28 DIAGNOSIS — Z11.59 NEED FOR HEPATITIS C SCREENING TEST: ICD-10-CM

## 2020-05-28 DIAGNOSIS — I10 ESSENTIAL HYPERTENSION: ICD-10-CM

## 2020-05-28 LAB
ALBUMIN SERPL BCP-MCNC: 4.4 G/DL (ref 3.2–4.9)
ALBUMIN/GLOB SERPL: 1.6 G/DL
ALP SERPL-CCNC: 101 U/L (ref 30–99)
ALT SERPL-CCNC: 15 U/L (ref 2–50)
ANION GAP SERPL CALC-SCNC: 17 MMOL/L (ref 7–16)
AST SERPL-CCNC: 18 U/L (ref 12–45)
BASOPHILS # BLD AUTO: 0.6 % (ref 0–1.8)
BASOPHILS # BLD: 0.05 K/UL (ref 0–0.12)
BILIRUB SERPL-MCNC: 0.7 MG/DL (ref 0.1–1.5)
BUN SERPL-MCNC: 26 MG/DL (ref 8–22)
CALCIUM SERPL-MCNC: 9.8 MG/DL (ref 8.5–10.5)
CHLORIDE SERPL-SCNC: 97 MMOL/L (ref 96–112)
CHOLEST SERPL-MCNC: 136 MG/DL (ref 100–199)
CO2 SERPL-SCNC: 22 MMOL/L (ref 20–33)
CREAT SERPL-MCNC: 1.09 MG/DL (ref 0.5–1.4)
EOSINOPHIL # BLD AUTO: 0.21 K/UL (ref 0–0.51)
EOSINOPHIL NFR BLD: 2.7 % (ref 0–6.9)
ERYTHROCYTE [DISTWIDTH] IN BLOOD BY AUTOMATED COUNT: 42.1 FL (ref 35.9–50)
FASTING STATUS PATIENT QL REPORTED: NORMAL
GLOBULIN SER CALC-MCNC: 2.8 G/DL (ref 1.9–3.5)
GLUCOSE SERPL-MCNC: 90 MG/DL (ref 65–99)
HCT VFR BLD AUTO: 36.6 % (ref 37–47)
HDLC SERPL-MCNC: 57 MG/DL
HGB BLD-MCNC: 12.2 G/DL (ref 12–16)
IMM GRANULOCYTES # BLD AUTO: 0.02 K/UL (ref 0–0.11)
IMM GRANULOCYTES NFR BLD AUTO: 0.3 % (ref 0–0.9)
LDLC SERPL CALC-MCNC: 51 MG/DL
LYMPHOCYTES # BLD AUTO: 1.23 K/UL (ref 1–4.8)
LYMPHOCYTES NFR BLD: 15.5 % (ref 22–41)
MCH RBC QN AUTO: 30.7 PG (ref 27–33)
MCHC RBC AUTO-ENTMCNC: 33.3 G/DL (ref 33.6–35)
MCV RBC AUTO: 92.2 FL (ref 81.4–97.8)
MONOCYTES # BLD AUTO: 0.52 K/UL (ref 0–0.85)
MONOCYTES NFR BLD AUTO: 6.6 % (ref 0–13.4)
NEUTROPHILS # BLD AUTO: 5.88 K/UL (ref 2–7.15)
NEUTROPHILS NFR BLD: 74.3 % (ref 44–72)
NRBC # BLD AUTO: 0 K/UL
NRBC BLD-RTO: 0 /100 WBC
PLATELET # BLD AUTO: 250 K/UL (ref 164–446)
PMV BLD AUTO: 10.5 FL (ref 9–12.9)
POTASSIUM SERPL-SCNC: 3.9 MMOL/L (ref 3.6–5.5)
PROT SERPL-MCNC: 7.2 G/DL (ref 6–8.2)
RBC # BLD AUTO: 3.97 M/UL (ref 4.2–5.4)
SODIUM SERPL-SCNC: 136 MMOL/L (ref 135–145)
TRIGL SERPL-MCNC: 139 MG/DL (ref 0–149)
WBC # BLD AUTO: 7.9 K/UL (ref 4.8–10.8)

## 2020-05-28 PROCEDURE — G0472 HEP C SCREEN HIGH RISK/OTHER: HCPCS | Mod: GA

## 2020-05-28 PROCEDURE — 84443 ASSAY THYROID STIM HORMONE: CPT

## 2020-05-28 PROCEDURE — 36415 COLL VENOUS BLD VENIPUNCTURE: CPT | Mod: GA

## 2020-05-28 PROCEDURE — 85025 COMPLETE CBC W/AUTO DIFF WBC: CPT

## 2020-05-28 PROCEDURE — 80061 LIPID PANEL: CPT

## 2020-05-28 PROCEDURE — 80053 COMPREHEN METABOLIC PANEL: CPT

## 2020-05-29 ENCOUNTER — OFFICE VISIT (OUTPATIENT)
Dept: MEDICAL GROUP | Facility: MEDICAL CENTER | Age: 70
End: 2020-05-29
Payer: MEDICARE

## 2020-05-29 VITALS
OXYGEN SATURATION: 94 % | DIASTOLIC BLOOD PRESSURE: 80 MMHG | SYSTOLIC BLOOD PRESSURE: 126 MMHG | TEMPERATURE: 97.8 F | BODY MASS INDEX: 40.85 KG/M2 | HEART RATE: 69 BPM | HEIGHT: 62 IN | WEIGHT: 222 LBS

## 2020-05-29 DIAGNOSIS — E03.4 HYPOTHYROIDISM DUE TO ACQUIRED ATROPHY OF THYROID: ICD-10-CM

## 2020-05-29 DIAGNOSIS — Z00.00 MEDICARE ANNUAL WELLNESS VISIT, SUBSEQUENT: ICD-10-CM

## 2020-05-29 DIAGNOSIS — E78.5 DYSLIPIDEMIA: ICD-10-CM

## 2020-05-29 DIAGNOSIS — I47.10 SVT (SUPRAVENTRICULAR TACHYCARDIA) (HCC): ICD-10-CM

## 2020-05-29 DIAGNOSIS — N18.30 CKD (CHRONIC KIDNEY DISEASE) STAGE 3, GFR 30-59 ML/MIN: ICD-10-CM

## 2020-05-29 DIAGNOSIS — I10 ESSENTIAL HYPERTENSION: ICD-10-CM

## 2020-05-29 DIAGNOSIS — M79.672 LEFT FOOT PAIN: ICD-10-CM

## 2020-05-29 DIAGNOSIS — E66.01 MORBID OBESITY WITH BMI OF 40.0-44.9, ADULT (HCC): ICD-10-CM

## 2020-05-29 LAB
HCV AB SER QL: NORMAL
TSH SERPL DL<=0.005 MIU/L-ACNC: 1.76 UIU/ML (ref 0.38–5.33)

## 2020-05-29 PROCEDURE — G0439 PPPS, SUBSEQ VISIT: HCPCS | Performed by: FAMILY MEDICINE

## 2020-05-29 RX ORDER — METOPROLOL SUCCINATE 50 MG/1
50 TABLET, EXTENDED RELEASE ORAL
Qty: 90 TAB | Refills: 3 | Status: SHIPPED | OUTPATIENT
Start: 2020-05-29 | End: 2021-04-20 | Stop reason: SDUPTHER

## 2020-05-29 RX ORDER — ROSUVASTATIN CALCIUM 10 MG/1
10 TABLET, COATED ORAL EVERY EVENING
Qty: 90 TAB | Refills: 3 | Status: SHIPPED | OUTPATIENT
Start: 2020-05-29 | End: 2021-04-20 | Stop reason: SDUPTHER

## 2020-05-29 RX ORDER — LISINOPRIL 40 MG/1
40 TABLET ORAL
Qty: 90 TAB | Refills: 3 | Status: SHIPPED | OUTPATIENT
Start: 2020-05-29 | End: 2021-04-20 | Stop reason: SDUPTHER

## 2020-05-29 RX ORDER — HYDROCHLOROTHIAZIDE 25 MG/1
25 TABLET ORAL EVERY EVENING
Qty: 90 TAB | Refills: 3 | Status: SHIPPED | OUTPATIENT
Start: 2020-05-29 | End: 2021-04-20 | Stop reason: SDUPTHER

## 2020-05-29 RX ORDER — HYDROCODONE BITARTRATE AND ACETAMINOPHEN 5; 325 MG/1; MG/1
1 TABLET ORAL EVERY 4 HOURS PRN
Qty: 30 TAB | Refills: 0 | Status: SHIPPED | OUTPATIENT
Start: 2020-05-29 | End: 2020-06-03

## 2020-05-29 RX ORDER — LEVOTHYROXINE SODIUM 0.07 MG/1
75 TABLET ORAL
Qty: 90 TAB | Refills: 3 | Status: SHIPPED | OUTPATIENT
Start: 2020-05-29 | End: 2021-04-20 | Stop reason: SDUPTHER

## 2020-05-29 ASSESSMENT — PATIENT HEALTH QUESTIONNAIRE - PHQ9: CLINICAL INTERPRETATION OF PHQ2 SCORE: 0

## 2020-05-29 ASSESSMENT — ENCOUNTER SYMPTOMS: GENERAL WELL-BEING: POOR

## 2020-05-29 ASSESSMENT — ACTIVITIES OF DAILY LIVING (ADL): BATHING_REQUIRES_ASSISTANCE: 1

## 2020-05-29 ASSESSMENT — FIBROSIS 4 INDEX: FIB4 SCORE: 1.26

## 2020-05-29 NOTE — ASSESSMENT & PLAN NOTE
Juan on left lateral side foot, with constant ache that can be severe at time.  Left foot pain is more painful and swollen at night. Walking is more painful.  Has been taking tylenol because of left foot pain but has not helped much.  Has a podiatrist, but appointment is 3 months out and that is too long for her, usually gets a shot by podiatrist.

## 2020-05-29 NOTE — PROGRESS NOTES
Chief Complaint   Patient presents with   • Annual Exam         HPI:  Prince Mazariegos is a 70 y.o. here for Medicare Annual Wellness Visit     Left foot pain  Juan on left lateral side foot, with constant ache that can be severe at time.  Left foot pain is more painful and swollen at night. Walking is more painful.  Has been taking tylenol because of left foot pain but has not helped much.  Has a podiatrist, but appointment is 3 months out and that is too long for her, usually gets a shot by podiatrist.        Patient Active Problem List    Diagnosis Date Noted   • HTN (hypertension) 11/11/2013     Priority: High   • Dyslipidemia 09/30/2013     Priority: High   • CKD (chronic kidney disease) stage 3, GFR 30-59 ml/min (formerly [facility]) 09/16/2015     Priority: Medium   • Chronic lower back pain 09/16/2015     Priority: Medium   • Chronic pain of right knee 09/16/2015     Priority: Medium   • GERD (gastroesophageal reflux disease) 09/30/2013     Priority: Medium   • Hypothyroid 09/30/2013     Priority: Medium   • Left foot pain 05/29/2020   • SVT (supraventricular tachycardia) (formerly [facility]) 01/02/2020   • Ventricular ectopic beats 10/31/2019   • Right foot pain 06/04/2018   • Rosacea 05/18/2018   • GENIA (obstructive sleep apnea) 04/10/2017   • Morbid obesity with BMI of 40.0-44.9, adult (formerly [facility]) 03/14/2017   • Cervical spine pain 06/02/2016   • Thoracic spine pain 06/02/2016       Current Outpatient Medications   Medication Sig Dispense Refill   • HYDROcodone-acetaminophen (NORCO) 5-325 MG Tab per tablet Take 1 Tab by mouth every four hours as needed (severe pain) for up to 5 days. 30 Tab 0   • CALCIUM PO Take 2 Tabs by mouth every evening.     • hydroCHLOROthiazide (HYDRODIURIL) 25 MG Tab Take 25 mg by mouth every evening.     • rosuvastatin (CRESTOR) 10 MG Tab Take 10 mg by mouth every evening.     • acetaminophen (TYLENOL) 325 MG Tab Take 2 Tabs by mouth every 8 hours as needed for Moderate Pain.     • lisinopril (PRINIVIL, ZESTRIL)  40 MG tablet Take 1 Tab by mouth every day. 90 Tab 3   • levothyroxine (SYNTHROID) 75 MCG Tab Take 1 Tab by mouth every day. 90 Tab 3   • metoprolol SR (TOPROL XL) 50 MG TABLET SR 24 HR Take 1 Tab by mouth every day. 90 Tab 3   • Cholecalciferol (VITAMIN D) 2000 UNIT Tab Take 2,000 Units by mouth every evening.     • Probiotic Product (PROBIOTIC-10 PO) Take 1 Tab by mouth every day.     • polyethylene glycol 3350 (MIRALAX) Powder Take 17 g by mouth every day.     • multivitamin (THERAGRAN) Tab Take 2 Tabs by mouth every evening.     • Fiber Powder Take 1 Each by mouth every day.       No current facility-administered medications for this visit.             Current supplements as per medication list.       Allergies: Aleve [gnp naproxen sodium]; Latex; Naproxen sodium; Other misc; Skelaxin [metaxalone]; and Sunscreens    Current social contact/activities: living with brother. Doing regular exercises.    She  reports that she has never smoked. She has never used smokeless tobacco. She reports that she does not drink alcohol or use drugs.  Counseling given: Not Answered      DPA/Advanced Directive:  Patient has Advanced Directive, but it is not on file. Instructed to bring in a copy to scan into their chart.    ROS:    Gait: Uses no assistive device  Ostomy: No  Other tubes: No  Amputations: No  Chronic oxygen use: No  Last eye exam: within the last year.  Wears hearing aids: No   : Denies any urinary leakage during the last 6 months      Depression Screening    Little interest or pleasure in doing things?  0 - not at all  Feeling down, depressed , or hopeless? 0 - not at all  Patient Health Questionnaire Score: 0     If depressive symptoms identified deferred to follow up visit unless specifically addressed in assessment and plan.    Interpretation of PHQ-9 Total Score   Score Severity   1-4 No Depression   5-9 Mild Depression   10-14 Moderate Depression   15-19 Moderately Severe Depression   20-27 Severe  Depression    Screening for Cognitive Impairment    Three Minute Recall (village, kitchen, baby) 3/3    Paul clock face with all 12 numbers and set the hands to show 10 past 10.  Yes    Cognitive concerns identified deferred for follow up unless specifically addressed in assessment and plan.    Fall Risk Assessment    Has the patient had two or more falls in the last year or any fall with injury in the last year?  No    Safety Assessment    Throw rugs on floor.  Yes  Handrails on all stairs.  Yes  Good lighting in all hallways.  Yes  Difficulty hearing.  No  Patient counseled about all safety risks that were identified.    Functional Assessment ADLs    Are there any barriers preventing you from cooking for yourself or meeting nutritional needs?  No.    Are there any barriers preventing you from driving safely or obtaining transportation?  No.    Are there any barriers preventing you from using a telephone or calling for help?  No.    Are there any barriers preventing you from shopping?  No.    Are there any barriers preventing you from taking care of your own finances?  No.    Are there any barriers preventing you from managing your medications?  No.    Are there any barriers preventing you from showering, bathing or dressing yourself?  Yes. Foot problems have been making it harder, balance has not been good  Are you currently engaging in any exercise or physical activity?  Yes.  Currently doing physical therapy, having difficulty walking  What is your perception of your health?  Poor.      Health Maintenance Summary                IMM ZOSTER VACCINES Overdue 1/6/2014      Done 11/11/2013 Imm Admin: Zoster Vaccine Live (ZVL) (Zostavax)    Annual Wellness Visit Overdue 5/22/2020      Done 5/22/2019 SUBSEQUENT ANNUAL WELLNESS VISIT-INCLUDES PPPS ()     Patient has more history with this topic...    MAMMOGRAM Next Due 11/1/2020      Done 11/1/2019 MA-SCREENING MAMMO BILAT W/TOMOSYNTHESIS W/CAD     Patient has more  history with this topic...    BONE DENSITY Next Due 12/4/2020      Done 12/4/2015 DS-BONE DENSITY STUDY (DEXA)     Patient has more history with this topic...    IMM DTaP/Tdap/Td Vaccine Next Due 3/14/2027      Done 3/14/2017 Imm Admin: Tdap Vaccine     Patient has more history with this topic...    COLONOSCOPY Next Due 5/7/2029      Done 5/7/2019 REFERRAL TO GI FOR COLONOSCOPY     Patient has more history with this topic...          Patient Care Team:  Joao Ireland M.D. as PCP - General (Family Medicine)  Andrea Juares M.D. as Consulting Physician (Gynecology)  [facility] as Respiratory (DME Supplier)  JD Crandall (Dermatology)  Angelina DALE MD (Gastroenterology)  Karli Rodriguez (Dentistry)  Edward Reeves OD (Ophthalmology)  Dr. Juan Antonio Larsen MD (Neurosurgery)        Social History     Tobacco Use   • Smoking status: Never Smoker   • Smokeless tobacco: Never Used   Substance Use Topics   • Alcohol use: No     Alcohol/week: 0.0 oz     Frequency: Never     Binge frequency: Never   • Drug use: No     Family History   Problem Relation Age of Onset   • Cancer Mother         Breast cancer   • Cancer Sister         Breast cancer   • Cancer Other    • Cancer Paternal Grandfather         liver   • Cancer Father         Pancreatic cancer   • Cancer Maternal Aunt    • Heart Disease Neg Hx      She  has a past medical history of Anesthesia, Arrhythmia, Arthritis, Back pain, Breath shortness, Chest pain (May 2016), Chickenpox, DDD (degenerative disc disease), cervical, Disorder of thyroid, Enlarged tonsils, GERD (gastroesophageal reflux disease), Taj[place] measles, Hemorrhoids, High cholesterol, Hypertension, Obesity, Pain, PONV (postoperative nausea and vomiting), Psychiatric disorder, Skin cancer, Sleep apnea, and Snoring.   Past Surgical History:   Procedure Laterality Date   • PB RECONSTR TOTAL SHOULDER IMPLANT Right 8/14/2019    Procedure: ARTHROPLASTY, SHOULDER, TOTAL- REVERSE;   "Surgeon: Parish Garcia M.D.;  Location: SURGERY [facility];  Service: Orthopedics   • CARPAL TUNNEL ENDOSCOPIC  10/28/2011    Performed by EDGAR GARCIA at SURGERY SAME DAY [facility]   • CARPAL TUNNEL ENDOSCOPIC  10/17/2011    Performed by DEGAR GARCIA at SURGERY SAME DAY [facility]   • HYSTEROSCOPY WITH VIDEO DIAGNOSTIC  10/7/08    Performed by MICHELINE WEBER at SURGERY SAME DAY [facility]   • DILATION AND CURETTAGE  10/7/08    Performed by MICHELINE WEBER at SURGERY SAME DAY [facility]   • LUMPECTOMY Right 2007    Breast lump removed   • CARPAL TUNNEL RELEASE     • OTHER      hemorrhoid banding   • OTHER CARDIAC SURGERY      Cath - 4-5 years ago with ba   • TONSILLECTOMY         Exam:   /80 (BP Location: Right arm, Patient Position: Sitting, BP Cuff Size: Adult)   Pulse 69   Temp 36.6 °C (97.8 °F) (Temporal)   Ht 1.575 m (5' 2\")   Wt 100.7 kg (222 lb)   SpO2 94%  Body mass index is 40.6 kg/m².    Constitutional: Alert, no distress.  Skin: Warm, dry, good turgor, no rashes in visible areas.  Eye: Equal, round and reactive, conjunctiva clear, lids normal.  Respiratory: Unlabored respiratory effort, lungs clear to auscultation, no wheezes, no ronchi.  Cardiovascular: Normal S1, S2, no murmur, no edema.  Psych: Alert and oriented x3, normal affect and mood.  Left foot: Positive tenderness palpation of mid lateral foot with pain upon midfoot rotation.  No erythema, no ulceration, no lesions of skin, no edema.    Assessment and Plan. The following treatment and monitoring plan is recommended:    1. Medicare annual wellness visit, subsequent  Encouraged healthy lifestyle.  - Subsequent Annual Wellness Visit - Includes PPPS ()    2. CKD (chronic kidney disease) stage 3, GFR 30-59 ml/min ([facility])  Stable and improved. Continue to monitor annually.  - Subsequent Annual Wellness Visit - Includes PPPS ()    3. Morbid obesity with BMI of 40.0-44.9, adult " ([facility])  Encouraged healthy lifestyle.  - Subsequent Annual Wellness Visit - Includes PPPS ()    4. SVT (supraventricular tachycardia) (Former[facility])  Controlled with metoprolol.  - Subsequent Annual Wellness Visit - Includes PPPS ()    5. Essential hypertension  Controlled. Continue current medications.  - Subsequent Annual Wellness Visit - Includes PPPS ()  - lisinopril (PRINIVIL) 40 MG tablet; Take 1 Tab by mouth every day.  Dispense: 90 Tab; Refill: 3  - metoprolol SR (TOPROL XL) 50 MG TABLET SR 24 HR; Take 1 Tab by mouth every day.  Dispense: 90 Tab; Refill: 3  - CBC WITH DIFFERENTIAL; Future    6. Dyslipidemia  Controlled. Continue Crestor.  - Subsequent Annual Wellness Visit - Includes PPPS ()  - Comp Metabolic Panel; Future  - Lipid Profile; Future    7. Hypothyroidism due to acquired atrophy of thyroid  Controlled. Continue levothyroxine.  - Subsequent Annual Wellness Visit - Includes PPPS ()  - levothyroxine (SYNTHROID) 75 MCG Tab; Take 1 Tab by mouth every day.  Dispense: 90 Tab; Refill: 3  - TSH WITH REFLEX TO FT4; Future    8. Left foot pain  Most likely tendinitis. Follow up with podiatry for injection. Prescription for Norco short term.  - Subsequent Annual Wellness Visit - Includes PPPS ()  - HYDROcodone-acetaminophen (NORCO) 5-325 MG Tab per tablet; Take 1 Tab by mouth every four hours as needed (severe pain) for up to 5 days.  Dispense: 30 Tab; Refill: 0  - Consent for Opiate Prescription        Services suggested: No services needed at this time  Health Care Screening: Age-appropriate preventive services recommended by USPTF and ACIP covered by Medicare were discussed today. Services ordered if indicated and agreed upon by the patient.  Referrals offered: Community-based lifestyle interventions to reduce health risks and promote self-management and wellness, fall prevention, nutrition, physical activity, tobacco-use cessation, weight loss, and mental health services as per  orders if indicated.    Discussion today about general wellness and lifestyle habits:    · Prevent falls and reduce trip hazards; Cautioned about securing or removing rugs.  · Have a working fire alarm and carbon monoxide detector;   · Engage in regular physical activity and social activities     Follow-up: Return in about 1 year (around 5/29/2021) for Annual.

## 2020-06-03 ENCOUNTER — HOSPITAL ENCOUNTER (OUTPATIENT)
Dept: RADIOLOGY | Facility: MEDICAL CENTER | Age: 70
End: 2020-06-03
Attending: NURSE PRACTITIONER
Payer: MEDICARE

## 2020-06-03 DIAGNOSIS — M54.2 CERVICALGIA: ICD-10-CM

## 2020-06-03 PROCEDURE — 72050 X-RAY EXAM NECK SPINE 4/5VWS: CPT

## 2020-06-03 PROCEDURE — 72141 MRI NECK SPINE W/O DYE: CPT

## 2020-07-20 ENCOUNTER — HOSPITAL ENCOUNTER (EMERGENCY)
Facility: MEDICAL CENTER | Age: 70
End: 2020-07-20
Attending: EMERGENCY MEDICINE
Payer: MEDICARE

## 2020-07-20 ENCOUNTER — APPOINTMENT (OUTPATIENT)
Dept: RADIOLOGY | Facility: MEDICAL CENTER | Age: 70
End: 2020-07-20
Attending: EMERGENCY MEDICINE
Payer: MEDICARE

## 2020-07-20 VITALS
OXYGEN SATURATION: 97 % | TEMPERATURE: 97.9 F | WEIGHT: 226.63 LBS | HEIGHT: 62 IN | SYSTOLIC BLOOD PRESSURE: 124 MMHG | DIASTOLIC BLOOD PRESSURE: 55 MMHG | BODY MASS INDEX: 41.71 KG/M2 | HEART RATE: 73 BPM | RESPIRATION RATE: 20 BRPM

## 2020-07-20 DIAGNOSIS — K22.2 ESOPHAGEAL OBSTRUCTION: ICD-10-CM

## 2020-07-20 LAB — EKG IMPRESSION: NORMAL

## 2020-07-20 PROCEDURE — 99283 EMERGENCY DEPT VISIT LOW MDM: CPT

## 2020-07-20 PROCEDURE — 93005 ELECTROCARDIOGRAM TRACING: CPT

## 2020-07-20 PROCEDURE — 93005 ELECTROCARDIOGRAM TRACING: CPT | Performed by: EMERGENCY MEDICINE

## 2020-07-20 PROCEDURE — 71045 X-RAY EXAM CHEST 1 VIEW: CPT

## 2020-07-20 ASSESSMENT — FIBROSIS 4 INDEX: FIB4 SCORE: 1.26

## 2020-07-20 NOTE — ED TRIAGE NOTES
"Pt presents by self from home for feeling \"rotten and it came on really fast.\" states while eating, she developed nausea and felt like food was stuck. Reports tingling across her chest and unsure if food Is still stuck. A&Ox4 and ambulatory.   Pt goes on to say she overall feels unwell and does not know how to describe it.   Patient masked. No respiratory symptoms, no recent travel, denies known COVID exposure.     "

## 2020-07-21 NOTE — ED NOTES
"Rounded on pt.  States she feels \"just fine\" with the exception of slight discomfort to upper esophagus that feels like \"acid reflux\". States PTA while eating she felt like food was stuck in her esophagus.  At that time she felt nauseated and discomfort across her upper chest.  She sipped some water and sx resolved.  She finished her meal without difficulty.  Now, just mild discomfort.  \"I probably just need some Tums\".  "

## 2020-07-21 NOTE — DISCHARGE INSTRUCTIONS
You appear to have had a partial esophageal obstruction which resolved on its own.  Take Prilosec 40 mg or 2 tablets daily for 2 weeks.  [facility].  Return for significant chest pain, fever, inability to swallow or ill appearance.    You had a borderline or high normal blood pressure reading today.  This does not necessarily mean you have hypertension.  Please followup with your/a primary physician for comprehensive blood pressure evaluation and yearly fasting cholesterol assessment.  BP Readings from Last 3 Encounters:   07/20/20 123/56   05/29/20 126/80   03/22/20 111/62

## 2020-07-21 NOTE — ED PROVIDER NOTES
ED Provider Note    CHIEF COMPLAINT  Chief Complaint   Patient presents with   • Nausea   • Foreign Body       HPI  Prince Mazariegos is a 70 y.o. female who presents the sensation that something was stuck in her throat while eating today at around noon.  No choking.  She coughed to try to clear it but did not retch.  It passed with drinking some water.  She later had another half hour of mild discomfort in the area right above the sternal notch.  No other chest pain.  History of GERD and she did have a burning sensation like GERD today behind her high sternum.  She is never had an obstruction before.  She is not seeing GI.  She normally takes Tums for her GERD.  No history of CAD.    REVIEW OF SYSTEMS  Pertinent positives include: Esophageal obstruction episode, burning high sternal notch discomfort.  Pertinent negatives include: choking, spitting saliva, inability to eat, choking cough shortness of breath.    PAST MEDICAL HISTORY  Past Medical History:   Diagnosis Date   • Anesthesia     PONV   • Arrhythmia    • Arthritis     osteo neck right knee back right shoulder   • Back pain    • Breath shortness     related to pain   • Chest pain May 2016    PET scan with no infarct or ischemia, normal LVEF.   • Chickenpox    • DDD (degenerative disc disease), cervical    • Disorder of thyroid    • Enlarged tonsils     Removed with adenoids   • GERD (gastroesophageal reflux disease)    • Macedonian measles    • Hemorrhoids     bleeding   • High cholesterol    • Hypertension     pt states well controlled on meds   • Obesity    • Pain     right shoulder/ torn rotator cuff   • PONV (postoperative nausea and vomiting)    • Psychiatric disorder     Anxiety  not taking any medications   • Skin cancer     skin   • Sleep apnea     uses cpap   • Snoring        SOCIAL HISTORY  Social History     Tobacco Use   • Smoking status: Never Smoker   • Smokeless tobacco: Never Used   Substance Use Topics   • Alcohol use: No     Alcohol/week:  "0.0 oz     Frequency: Never     Binge frequency: Never   • Drug use: No       CURRENT MEDICATIONS  Tums infrequent Prilosec    ALLERGIES  Allergies   Allergen Reactions   • Aleve [Gnp Naproxen Sodium]      hives   • Latex Rash   • Naproxen Sodium Hives   • Other Misc Swelling     Some soap & toothpaste puffs, colored laundry soap dryer sheets   • Skelaxin [Metaxalone] Hives   • Sunscreens Hives       PHYSICAL EXAM  VITAL SIGNS: /56   Pulse 73   Temp 36.6 °C (97.9 °F) (Temporal)   Resp 20   Ht 1.575 m (5' 2\")   Wt 102.8 kg (226 lb 10.1 oz)   SpO2 97%   BMI 41.45 kg/m²   Constitutional :  Well developed, Well nourished, high normal blood pressure, afebrile, borderline tachypneic, no hypoxia room air.   HNT: Oropharynx moist without erythema edema wound or foreign body.   Eyes: pupils reactive without eye discharge nor conjunctival hyperemia.  Neck: Normal range of motion, No tenderness, Supple, No stridor.   Cardiovascular: Regular rhythm, No murmurs, No rubs, No gallops.  No cyanosis.   Respiratory: No rales, rhonchi, wheeze  Abdomen:  Soft, nontender  Skin: Warm, dry, no erythema, no rash.   Musculoskeletal: no limb deformities.    RADIOLOGY:  DX-CHEST-LIMITED (1 VIEW)   Final Result      No evidence of acute cardiopulmonary process.        EKG Interpretation 10:12 PM    Interpreted by me.  Indication: Chest discomfort    Rhythm: normal sinus   Rate: Normal at 74  Axis: normal  Ectopy: none  Conduction: normal  ST/T Waves: no acute change  Q Waves: none  R Wave progression: normal  Comparison: Not currently available    Clinical Impression: Normal sinus rhythm      COURSE & MEDICAL DECISION MAKING  Well-appearing patient presents with a transient and partial esophageal obstruction while eating which spontaneously resolved.  She did not retch.  There is no evidence of Boerhaave syndrome.  There is no evidence of aspiration.  There was no check choking episode.  No evidence of pneumothorax or pneumonia.  " Symptoms are unlike myocardial ischemia.    PLAN:  Prilosec 40 mg daily for 2 weeks    Esophageal obstruction handout given  Return for chest pain, shortness of breath, fever    [facility]  [address] [place] [id number] [phone]  Schedule an appointment as soon as possible for a visit   for consultation      CONDITION:  Good.    FINAL IMPRESSION:  1. Esophageal obstruction          Electronically signed by: Willam Michaud M.D., 7/20/2020

## 2020-07-21 NOTE — ED NOTES
Discharge instructions provided.  Pt verbalized the understanding of discharge instructions to follow up with PCP/GI and to return to ER if condition worsens.  Pt ambulated out of ER without difficulty.

## 2020-11-04 ENCOUNTER — HOSPITAL ENCOUNTER (OUTPATIENT)
Dept: RADIOLOGY | Facility: MEDICAL CENTER | Age: 70
End: 2020-11-04
Attending: SPECIALIST
Payer: MEDICARE

## 2020-11-04 DIAGNOSIS — Z12.31 VISIT FOR SCREENING MAMMOGRAM: ICD-10-CM

## 2020-11-04 PROCEDURE — 77067 SCR MAMMO BI INCL CAD: CPT

## 2020-11-09 ENCOUNTER — OFFICE VISIT (OUTPATIENT)
Dept: URGENT CARE | Facility: CLINIC | Age: 70
End: 2020-11-09
Payer: MEDICARE

## 2020-11-09 ENCOUNTER — HOSPITAL ENCOUNTER (OUTPATIENT)
Dept: LAB | Facility: MEDICAL CENTER | Age: 70
End: 2020-11-09
Attending: PHYSICIAN ASSISTANT
Payer: MEDICARE

## 2020-11-09 VITALS
SYSTOLIC BLOOD PRESSURE: 108 MMHG | DIASTOLIC BLOOD PRESSURE: 60 MMHG | BODY MASS INDEX: 41.22 KG/M2 | WEIGHT: 224 LBS | HEART RATE: 58 BPM | TEMPERATURE: 97 F | OXYGEN SATURATION: 100 % | HEIGHT: 62 IN | RESPIRATION RATE: 16 BRPM

## 2020-11-09 DIAGNOSIS — M79.89 PAIN AND SWELLING OF TOE, RIGHT: ICD-10-CM

## 2020-11-09 DIAGNOSIS — Z87.448 HISTORY OF CHRONIC KIDNEY DISEASE: ICD-10-CM

## 2020-11-09 DIAGNOSIS — M79.674 PAIN AND SWELLING OF TOE, RIGHT: ICD-10-CM

## 2020-11-09 LAB
ALBUMIN SERPL BCP-MCNC: 4.3 G/DL (ref 3.2–4.9)
ALBUMIN/GLOB SERPL: 1.5 G/DL
ALP SERPL-CCNC: 126 U/L (ref 30–99)
ALT SERPL-CCNC: 14 U/L (ref 2–50)
ANION GAP SERPL CALC-SCNC: 13 MMOL/L (ref 7–16)
AST SERPL-CCNC: 15 U/L (ref 12–45)
BASOPHILS # BLD AUTO: 0.7 % (ref 0–1.8)
BASOPHILS # BLD: 0.05 K/UL (ref 0–0.12)
BILIRUB SERPL-MCNC: 0.5 MG/DL (ref 0.1–1.5)
BUN SERPL-MCNC: 24 MG/DL (ref 8–22)
CALCIUM SERPL-MCNC: 10.6 MG/DL (ref 8.5–10.5)
CHLORIDE SERPL-SCNC: 102 MMOL/L (ref 96–112)
CO2 SERPL-SCNC: 25 MMOL/L (ref 20–33)
CREAT SERPL-MCNC: 1.23 MG/DL (ref 0.5–1.4)
EOSINOPHIL # BLD AUTO: 0.21 K/UL (ref 0–0.51)
EOSINOPHIL NFR BLD: 2.8 % (ref 0–6.9)
ERYTHROCYTE [DISTWIDTH] IN BLOOD BY AUTOMATED COUNT: 45.4 FL (ref 35.9–50)
GLOBULIN SER CALC-MCNC: 2.9 G/DL (ref 1.9–3.5)
GLUCOSE SERPL-MCNC: 116 MG/DL (ref 65–99)
HCT VFR BLD AUTO: 38.3 % (ref 37–47)
HGB BLD-MCNC: 12.5 G/DL (ref 12–16)
IMM GRANULOCYTES # BLD AUTO: 0.03 K/UL (ref 0–0.11)
IMM GRANULOCYTES NFR BLD AUTO: 0.4 % (ref 0–0.9)
LYMPHOCYTES # BLD AUTO: 1.44 K/UL (ref 1–4.8)
LYMPHOCYTES NFR BLD: 19.3 % (ref 22–41)
MCH RBC QN AUTO: 30.6 PG (ref 27–33)
MCHC RBC AUTO-ENTMCNC: 32.6 G/DL (ref 33.6–35)
MCV RBC AUTO: 93.6 FL (ref 81.4–97.8)
MONOCYTES # BLD AUTO: 0.55 K/UL (ref 0–0.85)
MONOCYTES NFR BLD AUTO: 7.4 % (ref 0–13.4)
NEUTROPHILS # BLD AUTO: 5.17 K/UL (ref 2–7.15)
NEUTROPHILS NFR BLD: 69.4 % (ref 44–72)
NRBC # BLD AUTO: 0 K/UL
NRBC BLD-RTO: 0 /100 WBC
PLATELET # BLD AUTO: 301 K/UL (ref 164–446)
PMV BLD AUTO: 9.9 FL (ref 9–12.9)
POTASSIUM SERPL-SCNC: 4.2 MMOL/L (ref 3.6–5.5)
PROT SERPL-MCNC: 7.2 G/DL (ref 6–8.2)
RBC # BLD AUTO: 4.09 M/UL (ref 4.2–5.4)
SODIUM SERPL-SCNC: 140 MMOL/L (ref 135–145)
URATE SERPL-MCNC: 10.1 MG/DL (ref 1.9–8.2)
WBC # BLD AUTO: 7.5 K/UL (ref 4.8–10.8)

## 2020-11-09 PROCEDURE — 84550 ASSAY OF BLOOD/URIC ACID: CPT

## 2020-11-09 PROCEDURE — 99214 OFFICE O/P EST MOD 30 MIN: CPT | Performed by: PHYSICIAN ASSISTANT

## 2020-11-09 PROCEDURE — 36415 COLL VENOUS BLD VENIPUNCTURE: CPT

## 2020-11-09 PROCEDURE — 85025 COMPLETE CBC W/AUTO DIFF WBC: CPT

## 2020-11-09 PROCEDURE — 80053 COMPREHEN METABOLIC PANEL: CPT

## 2020-11-09 RX ORDER — ACETAMINOPHEN 500 MG
1000 TABLET ORAL EVERY 6 HOURS PRN
COMMUNITY

## 2020-11-09 RX ORDER — METHYLPREDNISOLONE 4 MG/1
TABLET ORAL
Qty: 21 TAB | Refills: 0 | Status: SHIPPED | OUTPATIENT
Start: 2020-11-09 | End: 2022-03-15

## 2020-11-09 ASSESSMENT — ENCOUNTER SYMPTOMS
SENSORY CHANGE: 0
WEAKNESS: 0
CHILLS: 0
TINGLING: 0
NAUSEA: 0
FALLS: 0
BRUISES/BLEEDS EASILY: 0
MYALGIAS: 0
FEVER: 0
VOMITING: 0
SHORTNESS OF BREATH: 0

## 2020-11-09 ASSESSMENT — FIBROSIS 4 INDEX: FIB4 SCORE: 1.26

## 2020-11-09 NOTE — PROGRESS NOTES
Subjective:   Prince Mazariegos is a 70 y.o. female who presents for Toe Pain (right big toe, painful to walk, tender, redness, feels warm, feels tight at nights x 2 weeks)      HPI  70 y.o. female presents to urgent care with new problem to provider of redness, pain, and swelling over right great toe x 2 weeks.  Patient reports her pain is worse with ambulation and there is increased swelling and redness at the end of the day.  She denies fevers or vomiting.  Minimal relief with Tylenol.  Denies injury or history of similar.  Patient denies history of gout.  She does have history of stage III chronic kidney disease. Denies other associated aggravating or alleviating factors.     Review of Systems   Constitutional: Negative for chills, fever and malaise/fatigue.   Respiratory: Negative for shortness of breath.    Cardiovascular: Negative for chest pain and leg swelling.   Gastrointestinal: Negative for nausea and vomiting.   Musculoskeletal: Negative for falls and myalgias.   Skin: Negative for rash.        Right great toe redness, pain, and swelling   Neurological: Negative for tingling, sensory change and weakness.   Endo/Heme/Allergies: Does not bruise/bleed easily.   All other systems reviewed and are negative.      Patient Active Problem List   Diagnosis   • GERD (gastroesophageal reflux disease)   • Hypothyroid   • Dyslipidemia   • HTN (hypertension)   • CKD (chronic kidney disease) stage 3, GFR 30-59 ml/min   • Chronic lower back pain   • Chronic pain of right knee   • Cervical spine pain   • Thoracic spine pain   • Morbid obesity with BMI of 40.0-44.9, adult ([facility])   • GENIA (obstructive sleep apnea)   • Rosacea   • Right foot pain   • Ventricular ectopic beats   • SVT (supraventricular tachycardia) ([facility])   • Left foot pain     Past Surgical History:   Procedure Laterality Date   • PB RECONSTR TOTAL SHOULDER IMPLANT Right 8/14/2019    Procedure: ARTHROPLASTY, SHOULDER, TOTAL- REVERSE;  Surgeon: Parish VENTURA  "BEVERLEY Garcia;  Location: SURGERY [facility];  Service: Orthopedics   • CARPAL TUNNEL ENDOSCOPIC  10/28/2011    Performed by EDGAR GARCIA at SURGERY SAME DAY [facility]   • CARPAL TUNNEL ENDOSCOPIC  10/17/2011    Performed by EDGAR GARCIA at SURGERY SAME DAY [facility]S   • HYSTEROSCOPY WITH VIDEO DIAGNOSTIC  10/7/08    Performed by MICHELINE WEBER at SURGERY SAME DAY [facility] ORS   • DILATION AND CURETTAGE  10/7/08    Performed by MICHELINE WEBER at SURGERY SAME DAY [facility] ORS   • LUMPECTOMY Right 2007    Breast lump removed   • CARPAL TUNNEL RELEASE     • OTHER      hemorrhoid banding   • OTHER CARDIAC SURGERY      Cath - 4-5 years ago with ba   • TONSILLECTOMY       Social History     Tobacco Use   • Smoking status: Never Smoker   • Smokeless tobacco: Never Used   Substance Use Topics   • Alcohol use: No     Alcohol/week: 0.0 oz     Frequency: Never     Binge frequency: Never   • Drug use: No      Family History   Problem Relation Age of Onset   • Cancer Mother         Breast cancer   • Cancer Sister         Breast cancer   • Cancer Other    • Cancer Paternal Grandfather         liver   • Cancer Father         Pancreatic cancer   • Cancer Maternal Aunt    • Heart Disease Neg Hx       (Allergies, Medications, & Tobacco/Substance Use were reconciled by the Medical Assistant and reviewed by myself. The family history is prepopulated)     Objective:     /60 (BP Location: Right arm, Patient Position: Sitting, BP Cuff Size: Adult)   Pulse (!) 58   Temp 36.1 °C (97 °F) (Temporal)   Resp 16   Ht 1.575 m (5' 2\")   Wt 101.6 kg (224 lb)   SpO2 100%   BMI 40.97 kg/m²     Physical Exam  Vitals signs reviewed.   Constitutional:       General: She is not in acute distress.     Appearance: Normal appearance. She is well-developed. She is not ill-appearing.   HENT:      Head: Normocephalic and atraumatic.   Eyes:      Conjunctiva/sclera: Conjunctivae normal.   Neck:      " Musculoskeletal: Normal range of motion and neck supple.   Cardiovascular:      Rate and Rhythm: Normal rate.   Pulmonary:      Effort: Pulmonary effort is normal. No respiratory distress.   Musculoskeletal:         General: No deformity or signs of injury.      Right lower leg: No edema.      Left lower leg: No edema.        Feet:    Neurological:      General: No focal deficit present.      Mental Status: She is alert and oriented to person, place, and time.   Psychiatric:         Mood and Affect: Mood normal.         Behavior: Behavior normal.         Thought Content: Thought content normal.         Judgment: Judgment normal.         Assessment/Plan:     1. Pain and swelling of toe, right  URIC ACID, SERUM    CBC WITH DIFFERENTIAL    methylPREDNISolone (MEDROL DOSEPAK) 4 MG Tablet Therapy Pack   2. History of chronic kidney disease  Comp Metabolic Panel     Uric acid, CBC, and CMP ordered stat.  I will follow-up pending results.  Differential diagnosis include gout due to history of stage III chronic kidney disease, cellulitis, and injury.  Recommend continued use of Tylenol.  Sent Medrol Dosepak for empiric teatment of her gout-like symptoms.  Recommend follow-up with podiatry as scheduled in December.    Differential diagnosis, natural history, supportive care, and indications for immediate follow-up discussed.    Advised the patient to follow-up with the primary care physician for recheck, reevaluation, and consideration of further management.  Patient verbalized understanding of treatment plan and has no further questions regarding care.     Please note that this dictation was created using voice recognition software. I have made a reasonable attempt to correct obvious errors, but I expect that there are errors of grammar and possibly content that I did not discover before finalizing the note.    This note was electronically signed by Geno Morales PA-C

## 2020-11-17 ENCOUNTER — OFFICE VISIT (OUTPATIENT)
Dept: MEDICAL GROUP | Age: 70
End: 2020-11-17
Payer: MEDICARE

## 2020-11-17 VITALS
SYSTOLIC BLOOD PRESSURE: 100 MMHG | TEMPERATURE: 97.8 F | HEIGHT: 62 IN | DIASTOLIC BLOOD PRESSURE: 78 MMHG | HEART RATE: 65 BPM | BODY MASS INDEX: 41.96 KG/M2 | WEIGHT: 228 LBS | OXYGEN SATURATION: 99 %

## 2020-11-17 DIAGNOSIS — Z00.00 ENCOUNTER FOR MEDICAL EXAMINATION TO ESTABLISH CARE: ICD-10-CM

## 2020-11-17 DIAGNOSIS — E55.9 VITAMIN D DEFICIENCY: ICD-10-CM

## 2020-11-17 DIAGNOSIS — I10 ESSENTIAL HYPERTENSION: ICD-10-CM

## 2020-11-17 DIAGNOSIS — Z78.0 POSTMENOPAUSAL STATUS (AGE-RELATED) (NATURAL): ICD-10-CM

## 2020-11-17 DIAGNOSIS — E78.5 DYSLIPIDEMIA: ICD-10-CM

## 2020-11-17 DIAGNOSIS — M10.071 ACUTE IDIOPATHIC GOUT INVOLVING TOE OF RIGHT FOOT: ICD-10-CM

## 2020-11-17 DIAGNOSIS — E03.8 OTHER SPECIFIED HYPOTHYROIDISM: ICD-10-CM

## 2020-11-17 DIAGNOSIS — N95.1 MENOPAUSAL STATE: ICD-10-CM

## 2020-11-17 DIAGNOSIS — N18.30 STAGE 3 CHRONIC KIDNEY DISEASE, UNSPECIFIED WHETHER STAGE 3A OR 3B CKD: ICD-10-CM

## 2020-11-17 PROBLEM — M10.9 ACUTE GOUT INVOLVING TOE OF RIGHT FOOT: Status: ACTIVE | Noted: 2020-11-17

## 2020-11-17 PROCEDURE — 99214 OFFICE O/P EST MOD 30 MIN: CPT | Performed by: FAMILY MEDICINE

## 2020-11-17 RX ORDER — PREDNISONE 20 MG/1
TABLET ORAL
Qty: 12 TAB | Refills: 0 | Status: SHIPPED | OUTPATIENT
Start: 2020-11-17 | End: 2021-06-08

## 2020-11-17 RX ORDER — OMEPRAZOLE 20 MG/1
CAPSULE, DELAYED RELEASE ORAL
COMMUNITY
Start: 2020-11-13 | End: 2022-07-19

## 2020-11-17 ASSESSMENT — FIBROSIS 4 INDEX: FIB4 SCORE: 0.91

## 2020-11-17 NOTE — PROGRESS NOTES
This medical record contains text that has been entered with the assistance of computer voice recognition and dictation software.  Therefore, it may contain unintended errors in text, spelling, punctuation, or grammar      Chief Complaint   Patient presents with   • Gout     throbbing in toes and swelling    • Establish Care         Prince Mazariegos is a 70 y.o. female here evaluation and management of: Establish care      HPI:     1. Encounter for medical examination to establish care  Patient is a 70-year-old female who presents to clinic to establish care.  She has significant past medical history of morbid obesity, hypertension, hyperlipidemia, hypothyroidism, chronic kidney disease, and gout.      Today the patient denied any chest pain, fevers, night sweats, unintentional weight loss.    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 Postmenopausal status (age-related) (natural)  Patient is due for DEXA scan      4. Essential hypertension  Home BP readings--- not monitoring    Today, the patient has been tollerating the BP meds without any issues. The patient denies any chest pain, cough, fevers, leg pain numbness or tingling.        5. Dyslipidemia  Rosuvastatin 10 mg p.o. nightly    Patient has been compliant with her statin regimen denies any muscle aches no history of elevated liver enzymes.    6. Stage 3 chronic kidney disease, unspecified whether stage 3a or 3b CKD  Patient understands importance of avoiding NSAIDs and blood pressure control.  She is  also on ACE inhibitor        Results for DAVID AGUIRRE (MRN [id number]) as of 11/17/2020 14:25   Ref. Range 11/9/2020 14:17   Bun Latest Ref Range: 8 - 22 mg/dL 24 (H)   Creatinine Latest Ref Range: 0.50 - 1.40 mg/dL 1.23   GFR If  Latest Ref Range: >60 mL/min/1.73 m 2 52 (A)   GFR If Non  Latest Ref Range: >60 mL/min/1.73 m 2 43 (A)           Current medicines (including changes today)  Current Outpatient Medications   Medication Sig Dispense Refill   • omeprazole (PRILOSEC) 20 MG delayed-release capsule      • predniSONE (DELTASONE) 20 MG Tab Take 3 tabs po for 2 days then 2 tabs for 2 days then 1 for 2 days 12 Tab 0   • acetaminophen (TYLENOL) 500 MG Tab Take 500-1,000 mg by mouth every 6 hours as needed.     • methylPREDNISolone (MEDROL DOSEPAK) 4 MG Tablet Therapy Pack Follow schedule on package instructions. 21 Tab 0   • levothyroxine (SYNTHROID) 75 MCG Tab Take 1 Tab by mouth every day. 90 Tab 3   • lisinopril (PRINIVIL) 40 MG tablet Take 1 Tab by mouth every day. 90 Tab 3   • metoprolol SR (TOPROL XL) 50 MG TABLET SR 24 HR Take 1 Tab by mouth every day. 90 Tab 3   • rosuvastatin (CRESTOR) 10 MG Tab Take 1 Tab by mouth every evening. 90 Tab 3   • hydroCHLOROthiazide (HYDRODIURIL) 25 MG Tab Take 1 Tab by mouth every evening. 90 Tab 3   • CALCIUM PO Take 2 Tabs by mouth every evening.     • acetaminophen (TYLENOL) 325 MG Tab Take 2 Tabs by mouth every 8 hours as needed for Moderate Pain.     • Cholecalciferol (VITAMIN D) 2000 UNIT Tab Take 2,000 Units by mouth every evening.     • Probiotic Product (PROBIOTIC-10 PO) Take 1 Tab by mouth every day.     • polyethylene glycol 3350 (MIRALAX) Powder Take 17 g by mouth every day.     • multivitamin (THERAGRAN) Tab Take 2 Tabs by mouth every evening.     • Fiber Powder Take 1 Each by mouth every day.       No current facility-administered medications for this visit.      She  has a past medical history of Anesthesia, Arrhythmia,  "Arthritis, Back pain, Breath shortness, Chest pain (May 2016), Chickenpox, DDD (degenerative disc disease), cervical, Disorder of thyroid, Enlarged tonsils, GERD (gastroesophageal reflux disease), Chinese measles, Hemorrhoids, High cholesterol, Hypertension, Obesity, Pain, PONV (postoperative nausea and vomiting), Psychiatric disorder, Skin cancer, Sleep apnea, and Snoring.  She  has a past surgical history that includes lumpectomy (Right, ); tonsillectomy; other cardiac surgery; other; hysteroscopy with video diagnostic (10/7/08); dilation and curettage (10/7/08); carpal tunnel endoscopic (10/17/2011); carpal tunnel endoscopic (10/28/2011); carpal tunnel release; and pr reconstr total shoulder implant (Right, 2019).  Social History     Tobacco Use   • Smoking status: Never Smoker   • Smokeless tobacco: Never Used   Substance Use Topics   • Alcohol use: No     Alcohol/week: 0.0 oz     Frequency: Never     Binge frequency: Never   • Drug use: No     Social History     Social History Narrative   • Not on file     Family History   Problem Relation Age of Onset   • Cancer Mother         Breast cancer   • Cancer Sister         Breast cancer   • Cancer Other    • Cancer Paternal Grandfather         liver   • Cancer Father         Pancreatic cancer   • Cancer Maternal Aunt    • Heart Disease Neg Hx      Family Status   Relation Name Status   • Mo     • Sis x1 Alive   • OTHER cousin Alive   • PGFa old age    • Fa     • Bro x1 Alive   • MGMo old age    • MGFa old age    • PGMo old age    • MAunt  Alive   • Neg Hx  (Not Specified)         ROS    The pertinent  ROS findings can be seen in the HPI above.     All other systems reviewed and are negative     Objective:     /78 (BP Location: Left arm, Patient Position: Sitting, BP Cuff Size: Adult)   Pulse 65   Temp 36.6 °C (97.8 °F) (Temporal)   Ht 1.575 m (5' 2\")   Wt 103.4 kg (228 lb)   SpO2 99%  Body mass index " is 41.7 kg/m².      Physical Exam:    Constitutional: Alert, no distress.  Skin: no rashes  Eye: Equal, round and reactive, conjunctiva clear, lids normal.  ENMT: Lips without lesions, good dentition, oropharynx clear.  Neck: Trachea midline, no masses, no thyromegaly. No cervical or supraclavicular lymphadenopathy.  Respiratory: Unlabored respiratory effort, lungs clear to auscultation, no wheezes, no ronchi.  Cardiovascular: Normal S1, S2, no murmur, no edema  Abdomen: Soft, non-tender, no masses, no hepatosplenomegaly.        Assessment and Plan:   The following treatment plan was discussed      1. Encounter for medical examination to establish care  Care has been established  We need  baseline labs to establish a clinical profile    Age-appropriate preventive services recommended by USPTF guidelines and ACIP were discussed today.      The decision to initiate low-dose aspirin use for the primary prevention of CVD and CRC in adults aged 60 to 69 years who have a 10% or greater 10-year CVD risk should be an individual one      [facility] for AAA Screening in smokers for patients age 65 and older    Requested any outside Medical records to be sent to us  Denies intimate partner cris  Discussed seat belt safety        2. Postmenopausal status (age-related) (natural)    - DS-BONE DENSITY STUDY (DEXA)    3. Menopausal state    - DS-BONE DENSITY STUDY (DEXA)    4. Essential hypertension  Patient has been stable with current management  We will make no changes for now      5. Dyslipidemia  Patient has been stable with current management  We will make no changes for now      6. Stage 3 chronic kidney disease, unspecified whether stage 3a or 3b CKD  Continue bp control  Avoid Nsaids and all nephrotoxins  Renally dose all medications when indicated          Instructed to Follow up in clinic or ER for worsening symptoms, difficulty breathing, lack of expected recovery, or should new symptoms or problems  arise.    Followup: Return in about 6 months (around 5/17/2021) for Reevaluation, labs.       Once again this medical record contains text that has been entered with the assistance of computer voice recognition and dictation software.  Therefore, it may contain unintended errors in text, spelling, punctuation, or grammar

## 2020-11-23 ENCOUNTER — HOSPITAL ENCOUNTER (OUTPATIENT)
Dept: RADIOLOGY | Facility: MEDICAL CENTER | Age: 70
End: 2020-11-23
Attending: FAMILY MEDICINE
Payer: MEDICARE

## 2020-11-23 PROCEDURE — 77080 DXA BONE DENSITY AXIAL: CPT

## 2020-12-17 ENCOUNTER — APPOINTMENT (RX ONLY)
Dept: URBAN - METROPOLITAN AREA CLINIC 20 | Facility: CLINIC | Age: 70
Setting detail: DERMATOLOGY
End: 2020-12-17

## 2020-12-17 DIAGNOSIS — L57.8 OTHER SKIN CHANGES DUE TO CHRONIC EXPOSURE TO NONIONIZING RADIATION: ICD-10-CM

## 2020-12-17 DIAGNOSIS — L81.4 OTHER MELANIN HYPERPIGMENTATION: ICD-10-CM

## 2020-12-17 DIAGNOSIS — D22 MELANOCYTIC NEVI: ICD-10-CM

## 2020-12-17 DIAGNOSIS — L82.1 OTHER SEBORRHEIC KERATOSIS: ICD-10-CM

## 2020-12-17 DIAGNOSIS — L57.0 ACTINIC KERATOSIS: ICD-10-CM | Status: INADEQUATELY CONTROLLED

## 2020-12-17 DIAGNOSIS — D18.0 HEMANGIOMA: ICD-10-CM

## 2020-12-17 PROBLEM — D22.61 MELANOCYTIC NEVI OF RIGHT UPPER LIMB, INCLUDING SHOULDER: Status: ACTIVE | Noted: 2020-12-17

## 2020-12-17 PROBLEM — D18.01 HEMANGIOMA OF SKIN AND SUBCUTANEOUS TISSUE: Status: ACTIVE | Noted: 2020-12-17

## 2020-12-17 PROBLEM — D22.62 MELANOCYTIC NEVI OF LEFT UPPER LIMB, INCLUDING SHOULDER: Status: ACTIVE | Noted: 2020-12-17

## 2020-12-17 PROBLEM — D22.71 MELANOCYTIC NEVI OF RIGHT LOWER LIMB, INCLUDING HIP: Status: ACTIVE | Noted: 2020-12-17

## 2020-12-17 PROBLEM — D22.5 MELANOCYTIC NEVI OF TRUNK: Status: ACTIVE | Noted: 2020-12-17

## 2020-12-17 PROBLEM — D22.72 MELANOCYTIC NEVI OF LEFT LOWER LIMB, INCLUDING HIP: Status: ACTIVE | Noted: 2020-12-17

## 2020-12-17 PROCEDURE — 17003 DESTRUCT PREMALG LES 2-14: CPT

## 2020-12-17 PROCEDURE — 17000 DESTRUCT PREMALG LESION: CPT

## 2020-12-17 PROCEDURE — ? PRESCRIPTION

## 2020-12-17 PROCEDURE — ? LIQUID NITROGEN

## 2020-12-17 PROCEDURE — 99214 OFFICE O/P EST MOD 30 MIN: CPT | Mod: 25

## 2020-12-17 PROCEDURE — ? ADDITIONAL NOTES

## 2020-12-17 PROCEDURE — ? COUNSELING

## 2020-12-17 RX ORDER — IMIQUIMOD 12.5 MG/.25G
CREAM TOPICAL
Qty: 1 | Refills: 3 | Status: ERX | COMMUNITY
Start: 2020-12-17

## 2020-12-17 RX ADMIN — IMIQUIMOD: 12.5 CREAM TOPICAL at 00:00

## 2020-12-17 ASSESSMENT — LOCATION DETAILED DESCRIPTION DERM
LOCATION DETAILED: RIGHT DISTAL DORSAL FOREARM
LOCATION DETAILED: RIGHT RADIAL DORSAL HAND
LOCATION DETAILED: RIGHT PROXIMAL DORSAL FOREARM
LOCATION DETAILED: LEFT ULNAR DORSAL HAND
LOCATION DETAILED: RIGHT SUPERIOR UPPER BACK
LOCATION DETAILED: LEFT PROXIMAL CALF
LOCATION DETAILED: RIGHT INFERIOR CENTRAL MALAR CHEEK
LOCATION DETAILED: NASAL SUPRATIP
LOCATION DETAILED: RIGHT SUPERIOR LATERAL MALAR CHEEK
LOCATION DETAILED: RIGHT INFERIOR MEDIAL UPPER BACK
LOCATION DETAILED: LEFT RADIAL DORSAL HAND
LOCATION DETAILED: RIGHT DISTAL POSTERIOR UPPER ARM
LOCATION DETAILED: LEFT DISTAL POSTERIOR UPPER ARM
LOCATION DETAILED: RIGHT PROXIMAL CALF
LOCATION DETAILED: LEFT PROXIMAL DORSAL FOREARM
LOCATION DETAILED: RIGHT INFERIOR ANTERIOR NECK
LOCATION DETAILED: LEFT CENTRAL EYEBROW
LOCATION DETAILED: LEFT SUPERIOR UPPER BACK
LOCATION DETAILED: LEFT DISTAL DORSAL FOREARM

## 2020-12-17 ASSESSMENT — LOCATION ZONE DERM
LOCATION ZONE: FACE
LOCATION ZONE: TRUNK
LOCATION ZONE: ARM
LOCATION ZONE: HAND
LOCATION ZONE: NOSE
LOCATION ZONE: LEG
LOCATION ZONE: NECK

## 2020-12-17 ASSESSMENT — LOCATION SIMPLE DESCRIPTION DERM
LOCATION SIMPLE: RIGHT POSTERIOR UPPER ARM
LOCATION SIMPLE: LEFT EYEBROW
LOCATION SIMPLE: RIGHT CHEEK
LOCATION SIMPLE: LEFT FOREARM
LOCATION SIMPLE: RIGHT FOREARM
LOCATION SIMPLE: LEFT CALF
LOCATION SIMPLE: RIGHT CALF
LOCATION SIMPLE: NOSE
LOCATION SIMPLE: RIGHT ANTERIOR NECK
LOCATION SIMPLE: LEFT HAND
LOCATION SIMPLE: RIGHT UPPER BACK
LOCATION SIMPLE: RIGHT HAND
LOCATION SIMPLE: LEFT POSTERIOR UPPER ARM
LOCATION SIMPLE: LEFT UPPER BACK

## 2020-12-17 NOTE — PROCEDURE: ADDITIONAL NOTES
Additional Notes: Pt to apply imiquimod to other lesions that did not resolve with LN2.
Detail Level: Simple

## 2021-02-12 ENCOUNTER — TELEPHONE (OUTPATIENT)
Dept: MEDICAL GROUP | Age: 71
End: 2021-02-12

## 2021-02-17 NOTE — TELEPHONE ENCOUNTER
She can stop the hydrochlorothiazide for 1 week to see if that improves the pain.  Also make a 2-week blood pressure log while she is off of it.

## 2021-03-02 ENCOUNTER — APPOINTMENT (RX ONLY)
Dept: URBAN - METROPOLITAN AREA CLINIC 20 | Facility: CLINIC | Age: 71
Setting detail: DERMATOLOGY
End: 2021-03-02

## 2021-03-02 DIAGNOSIS — L57.8 OTHER SKIN CHANGES DUE TO CHRONIC EXPOSURE TO NONIONIZING RADIATION: ICD-10-CM

## 2021-03-02 DIAGNOSIS — L57.0 ACTINIC KERATOSIS: ICD-10-CM

## 2021-03-02 PROBLEM — D48.5 NEOPLASM OF UNCERTAIN BEHAVIOR OF SKIN: Status: ACTIVE | Noted: 2021-03-02

## 2021-03-02 PROCEDURE — 11103 TANGNTL BX SKIN EA SEP/ADDL: CPT

## 2021-03-02 PROCEDURE — ? BIOPSY BY SHAVE METHOD

## 2021-03-02 PROCEDURE — 99213 OFFICE O/P EST LOW 20 MIN: CPT | Mod: 25

## 2021-03-02 PROCEDURE — ? COUNSELING

## 2021-03-02 PROCEDURE — 11102 TANGNTL BX SKIN SINGLE LES: CPT

## 2021-03-02 PROCEDURE — ? DIAGNOSIS COMMENT

## 2021-03-02 ASSESSMENT — LOCATION SIMPLE DESCRIPTION DERM
LOCATION SIMPLE: RIGHT HAND
LOCATION SIMPLE: LEFT EYEBROW

## 2021-03-02 ASSESSMENT — LOCATION DETAILED DESCRIPTION DERM
LOCATION DETAILED: LEFT LATERAL EYEBROW
LOCATION DETAILED: RIGHT RADIAL DORSAL HAND

## 2021-03-02 ASSESSMENT — LOCATION ZONE DERM
LOCATION ZONE: FACE
LOCATION ZONE: HAND

## 2021-03-02 NOTE — PROCEDURE: MIPS QUALITY
Quality 402: Tobacco Use And Help With Quitting Among Adolescents: Patient screened for tobacco and never smoked
Quality 226: Preventive Care And Screening: Tobacco Use: Screening And Cessation Intervention: Patient screened for tobacco use and is an ex/non-smoker
Quality 111:Pneumonia Vaccination Status For Older Adults: Pneumococcal Vaccination Previously Received
Detail Level: Detailed
Quality 431: Preventive Care And Screening: Unhealthy Alcohol Use - Screening: Patient screened for unhealthy alcohol use using a single question and scores less than 2 times per year
Quality 130: Documentation Of Current Medications In The Medical Record: Current Medications Documented

## 2021-03-03 DIAGNOSIS — Z23 NEED FOR VACCINATION: ICD-10-CM

## 2021-03-10 ENCOUNTER — TELEMEDICINE (OUTPATIENT)
Dept: SLEEP MEDICINE | Facility: MEDICAL CENTER | Age: 71
End: 2021-03-10
Payer: MEDICARE

## 2021-03-10 VITALS — WEIGHT: 211 LBS | BODY MASS INDEX: 38.83 KG/M2 | HEIGHT: 62 IN

## 2021-03-10 DIAGNOSIS — I10 ESSENTIAL HYPERTENSION: ICD-10-CM

## 2021-03-10 DIAGNOSIS — G47.33 OSA (OBSTRUCTIVE SLEEP APNEA): ICD-10-CM

## 2021-03-10 DIAGNOSIS — Z78.9 NONSMOKER: ICD-10-CM

## 2021-03-10 PROBLEM — E66.01 MORBID OBESITY WITH BMI OF 40.0-44.9, ADULT (HCC): Status: RESOLVED | Noted: 2017-03-14 | Resolved: 2021-03-10

## 2021-03-10 PROCEDURE — 99213 OFFICE O/P EST LOW 20 MIN: CPT | Mod: 95,CR | Performed by: NURSE PRACTITIONER

## 2021-03-10 RX ORDER — ALLOPURINOL 100 MG/1
100 TABLET ORAL DAILY
COMMUNITY
End: 2021-06-08

## 2021-03-10 ASSESSMENT — FIBROSIS 4 INDEX: FIB4 SCORE: 0.91

## 2021-03-10 NOTE — PROGRESS NOTES
Virtual Visit: Established Patient   This visit was conducted via Zoom using secure and encrypted videoconferencing technology. The patient was in a private location in the state of [place].    The patient's identity was confirmed and verbal consent was obtained for this virtual visit.    Subjective:   CC: No chief complaint on file.      Prince Mazariegos is a 70 y.o. female presenting for evaluation and management of:    Last OV 3/4/20 with Agnes APRN  obstructive apnea. PMH of HTN, anxiety. She is currently in PT for pain r/t neck, knee, shoulder.      HSS from June 2016 showed an AHI of 12.2 with low oxygenation of 76%.  Currently she is being treated with CPAP @ [id number]. Current device obtained 2017.  Compliance download 2/8/21-3/9/21 notes 100% compliance, avg nightly use of 9hr 56min, no significant mask leak with reduced AHI 0.7/hr. Reviewed with patient. She tolerates mask and pressure well; tolerating nasal mask currently. She notes some pain with gout interrupting her sleep. She has some teeth issues currently too. Tylenol does help alleviate some discomfort currently. No napping during the day. She denies AM headaches. She denies cardiac or respiratory symptoms.   She has a hx of foot issues including gout and currently in PT. She can no longer do steroid injections. She is pending a root canal this week. She has also lost weight int he last year of 13lbs.     ROS in HPI; otherwise negative.      Allergies   Allergen Reactions   • Aleve [Gnp Naproxen Sodium]      hives   • Latex Rash   • Naproxen Sodium Hives   • Other Misc Swelling     Some soap & toothpaste puffs, colored laundry soap dryer sheets   • Skelaxin [Metaxalone] Hives   • Sunscreens Hives       Current medicines (including changes today)  Current Outpatient Medications   Medication Sig Dispense Refill   • allopurinol (ZYLOPRIM) 100 MG Tab Take 100 mg by mouth every day.     • omeprazole (PRILOSEC) 20 MG delayed-release capsule      •  predniSONE (DELTASONE) 20 MG Tab Take 3 tabs po for 2 days then 2 tabs for 2 days then 1 for 2 days 12 Tab 0   • acetaminophen (TYLENOL) 500 MG Tab Take 500-1,000 mg by mouth every 6 hours as needed.     • methylPREDNISolone (MEDROL DOSEPAK) 4 MG Tablet Therapy Pack Follow schedule on package instructions. 21 Tab 0   • levothyroxine (SYNTHROID) 75 MCG Tab Take 1 Tab by mouth every day. 90 Tab 3   • lisinopril (PRINIVIL) 40 MG tablet Take 1 Tab by mouth every day. 90 Tab 3   • metoprolol SR (TOPROL XL) 50 MG TABLET SR 24 HR Take 1 Tab by mouth every day. 90 Tab 3   • rosuvastatin (CRESTOR) 10 MG Tab Take 1 Tab by mouth every evening. 90 Tab 3   • hydroCHLOROthiazide (HYDRODIURIL) 25 MG Tab Take 1 Tab by mouth every evening. 90 Tab 3   • CALCIUM PO Take 2 Tabs by mouth every evening.     • acetaminophen (TYLENOL) 325 MG Tab Take 2 Tabs by mouth every 8 hours as needed for Moderate Pain.     • Cholecalciferol (VITAMIN D) 2000 UNIT Tab Take 2,000 Units by mouth every evening.     • Probiotic Product (PROBIOTIC-10 PO) Take 1 Tab by mouth every day.     • polyethylene glycol 3350 (MIRALAX) Powder Take 17 g by mouth every day.     • multivitamin (THERAGRAN) Tab Take 2 Tabs by mouth every evening.     • Fiber Powder Take 1 Each by mouth every day.       No current facility-administered medications for this visit.       Patient Active Problem List    Diagnosis Date Noted   • HTN (hypertension) 11/11/2013     Priority: High   • Dyslipidemia 09/30/2013     Priority: High   • CKD (chronic kidney disease) stage 3, GFR 30-59 ml/min 09/16/2015     Priority: Medium   • Chronic lower back pain 09/16/2015     Priority: Medium   • Chronic pain of right knee 09/16/2015     Priority: Medium   • GERD (gastroesophageal reflux disease) 09/30/2013     Priority: Medium   • Hypothyroid 09/30/2013     Priority: Medium   • Acute gout involving toe of right foot 11/17/2020   • Left foot pain 05/29/2020   • SVT (supraventricular tachycardia)  "([facility]) 01/02/2020   • Ventricular ectopic beats 10/31/2019   • Right foot pain 06/04/2018   • Rosacea 05/18/2018   • GENIA (obstructive sleep apnea) 04/10/2017   • Cervical spine pain 06/02/2016   • Thoracic spine pain 06/02/2016       Family History   Problem Relation Age of Onset   • Cancer Mother         Breast cancer   • Cancer Sister         Breast cancer   • Cancer Other    • Cancer Paternal Grandfather         liver   • Cancer Father         Pancreatic cancer   • Cancer Maternal Aunt    • Heart Disease Neg Hx        She  has a past medical history of Anesthesia, Arrhythmia, Arthritis, Back pain, Breath shortness, Chest pain (May 2016), Chickenpox, DDD (degenerative disc disease), cervical, Disorder of thyroid, Enlarged tonsils, GERD (gastroesophageal reflux disease), [place] measles, Hemorrhoids, High cholesterol, Hypertension, Obesity, Pain, PONV (postoperative nausea and vomiting), Psychiatric disorder, Skin cancer, Sleep apnea, and Snoring.  She  has a past surgical history that includes lumpectomy (Right, 2007); tonsillectomy; other cardiac surgery; other; hysteroscopy with video diagnostic (10/7/08); dilation and curettage (10/7/08); carpal tunnel endoscopic (10/17/2011); carpal tunnel endoscopic (10/28/2011); carpal tunnel release; and pr reconstr total shoulder implant (Right, 8/14/2019).       Objective:   Ht 1.575 m (5' 2\")   Wt 95.7 kg (211 lb)   BMI 38.59 kg/m²     Physical Exam:  Constitutional: Alert, no distress, well-groomed.  Skin: No rashes in visible areas.  Eye: Round. Conjunctiva clear, lids normal. No icterus. Glasses.  ENMT: Lips pink without lesions, good dentition, moist mucous membranes. Phonation normal.  Neck: No masses, no thyromegaly. Moves freely without pain.  Respiratory: Unlabored respiratory effort, no cough or audible wheeze  Psych: Alert and oriented x3, normal affect and mood.       Assessment and Plan:   The following treatment plan was discussed:     1. GENIA (obstructive " sleep apnea)    2. Essential hypertension    3. BMI 38.0-38.9,adult  - Height And Weight    4. Nonsmoker    Other orders  - allopurinol (ZYLOPRIM) 100 MG Tab; Take 100 mg by mouth every day.    Continue CPAP nightly; she will continue to benefit from therapy.  DME mask/supplies  Discussed sleep hygiene  Encouraged further weight loss through diet/exercise  F/u with PCP for other health concerns    Follow-up: 1 year with compliance report, sooner if needed.

## 2021-03-26 ENCOUNTER — HOSPITAL ENCOUNTER (OUTPATIENT)
Dept: LAB | Facility: MEDICAL CENTER | Age: 71
End: 2021-03-26
Attending: FAMILY MEDICINE
Payer: MEDICARE

## 2021-03-26 LAB
ALBUMIN SERPL BCP-MCNC: 3.8 G/DL (ref 3.2–4.9)
ALBUMIN/GLOB SERPL: 1.3 G/DL
ALP SERPL-CCNC: 103 U/L (ref 30–99)
ALT SERPL-CCNC: 7 U/L (ref 2–50)
ANION GAP SERPL CALC-SCNC: 12 MMOL/L (ref 7–16)
AST SERPL-CCNC: 15 U/L (ref 12–45)
BILIRUB SERPL-MCNC: 0.5 MG/DL (ref 0.1–1.5)
BUN SERPL-MCNC: 14 MG/DL (ref 8–22)
CALCIUM SERPL-MCNC: 9.6 MG/DL (ref 8.5–10.5)
CHLORIDE SERPL-SCNC: 98 MMOL/L (ref 96–112)
CO2 SERPL-SCNC: 23 MMOL/L (ref 20–33)
CREAT SERPL-MCNC: 1.02 MG/DL (ref 0.5–1.4)
GLOBULIN SER CALC-MCNC: 3 G/DL (ref 1.9–3.5)
GLUCOSE SERPL-MCNC: 107 MG/DL (ref 65–99)
POTASSIUM SERPL-SCNC: 3.8 MMOL/L (ref 3.6–5.5)
PROT SERPL-MCNC: 6.8 G/DL (ref 6–8.2)
SODIUM SERPL-SCNC: 133 MMOL/L (ref 135–145)
URATE SERPL-MCNC: 7.6 MG/DL (ref 1.9–8.2)

## 2021-03-26 PROCEDURE — 36415 COLL VENOUS BLD VENIPUNCTURE: CPT

## 2021-03-26 PROCEDURE — 80053 COMPREHEN METABOLIC PANEL: CPT

## 2021-03-26 PROCEDURE — 84550 ASSAY OF BLOOD/URIC ACID: CPT

## 2021-04-20 DIAGNOSIS — I10 ESSENTIAL HYPERTENSION: ICD-10-CM

## 2021-04-20 DIAGNOSIS — E03.4 HYPOTHYROIDISM DUE TO ACQUIRED ATROPHY OF THYROID: ICD-10-CM

## 2021-04-22 RX ORDER — LEVOTHYROXINE SODIUM 0.07 MG/1
75 TABLET ORAL
Qty: 90 TABLET | Refills: 3 | Status: SHIPPED | OUTPATIENT
Start: 2021-04-22 | End: 2022-04-01

## 2021-04-22 RX ORDER — LISINOPRIL 40 MG/1
40 TABLET ORAL
Qty: 90 TABLET | Refills: 3 | Status: SHIPPED | OUTPATIENT
Start: 2021-04-22 | End: 2022-04-01

## 2021-04-22 RX ORDER — METOPROLOL SUCCINATE 50 MG/1
50 TABLET, EXTENDED RELEASE ORAL
Qty: 90 TABLET | Refills: 3 | Status: SHIPPED | OUTPATIENT
Start: 2021-04-22 | End: 2022-04-01

## 2021-04-22 RX ORDER — ROSUVASTATIN CALCIUM 10 MG/1
10 TABLET, COATED ORAL EVERY EVENING
Qty: 90 TABLET | Refills: 3 | Status: SHIPPED | OUTPATIENT
Start: 2021-04-22 | End: 2022-04-01

## 2021-04-22 RX ORDER — HYDROCHLOROTHIAZIDE 25 MG/1
25 TABLET ORAL EVERY EVENING
Qty: 90 TABLET | Refills: 3 | Status: SHIPPED | OUTPATIENT
Start: 2021-04-22 | End: 2022-04-01

## 2021-05-25 ENCOUNTER — HOSPITAL ENCOUNTER (OUTPATIENT)
Dept: LAB | Facility: MEDICAL CENTER | Age: 71
End: 2021-05-25
Attending: FAMILY MEDICINE
Payer: MEDICARE

## 2021-05-25 DIAGNOSIS — E55.9 VITAMIN D DEFICIENCY: ICD-10-CM

## 2021-05-25 DIAGNOSIS — E78.5 DYSLIPIDEMIA: ICD-10-CM

## 2021-05-25 DIAGNOSIS — I10 ESSENTIAL HYPERTENSION: ICD-10-CM

## 2021-05-25 LAB
ALBUMIN SERPL BCP-MCNC: 4.1 G/DL (ref 3.2–4.9)
ALBUMIN/GLOB SERPL: 1.6 G/DL
ALP SERPL-CCNC: 93 U/L (ref 30–99)
ALT SERPL-CCNC: 14 U/L (ref 2–50)
ANION GAP SERPL CALC-SCNC: 10 MMOL/L (ref 7–16)
AST SERPL-CCNC: 17 U/L (ref 12–45)
BILIRUB SERPL-MCNC: 0.7 MG/DL (ref 0.1–1.5)
BUN SERPL-MCNC: 23 MG/DL (ref 8–22)
CALCIUM SERPL-MCNC: 9.3 MG/DL (ref 8.5–10.5)
CHLORIDE SERPL-SCNC: 101 MMOL/L (ref 96–112)
CHOLEST SERPL-MCNC: 135 MG/DL (ref 100–199)
CO2 SERPL-SCNC: 24 MMOL/L (ref 20–33)
CREAT SERPL-MCNC: 1.22 MG/DL (ref 0.5–1.4)
FASTING STATUS PATIENT QL REPORTED: NORMAL
GLOBULIN SER CALC-MCNC: 2.5 G/DL (ref 1.9–3.5)
GLUCOSE SERPL-MCNC: 101 MG/DL (ref 65–99)
HDLC SERPL-MCNC: 53 MG/DL
LDLC SERPL CALC-MCNC: 54 MG/DL
POTASSIUM SERPL-SCNC: 3.7 MMOL/L (ref 3.6–5.5)
PROT SERPL-MCNC: 6.6 G/DL (ref 6–8.2)
SODIUM SERPL-SCNC: 135 MMOL/L (ref 135–145)
T3FREE SERPL-MCNC: 2.63 PG/ML (ref 2–4.4)
T4 FREE SERPL-MCNC: 1.31 NG/DL (ref 0.93–1.7)
TRIGL SERPL-MCNC: 141 MG/DL (ref 0–149)
TSH SERPL DL<=0.005 MIU/L-ACNC: 1.17 UIU/ML (ref 0.38–5.33)

## 2021-05-25 PROCEDURE — 36415 COLL VENOUS BLD VENIPUNCTURE: CPT

## 2021-05-25 PROCEDURE — 80053 COMPREHEN METABOLIC PANEL: CPT

## 2021-05-25 PROCEDURE — 80061 LIPID PANEL: CPT

## 2021-05-25 PROCEDURE — 84439 ASSAY OF FREE THYROXINE: CPT

## 2021-05-25 PROCEDURE — 84080 ASSAY ALKALINE PHOSPHATASES: CPT

## 2021-05-25 PROCEDURE — 82306 VITAMIN D 25 HYDROXY: CPT

## 2021-05-25 PROCEDURE — 84481 FREE ASSAY (FT-3): CPT

## 2021-05-25 PROCEDURE — 84075 ASSAY ALKALINE PHOSPHATASE: CPT | Mod: XU

## 2021-05-25 PROCEDURE — 84443 ASSAY THYROID STIM HORMONE: CPT

## 2021-05-27 LAB
25(OH)D3 SERPL-MCNC: 48 NG/ML (ref 30–80)
ALP BONE SERPL-CCNC: 29 U/L (ref 0–55)
ALP ISOS SERPL HS-CCNC: 0 U/L
ALP LIVER SERPL-CCNC: 64 U/L (ref 0–94)
ALP SERPL-CCNC: 93 U/L (ref 40–120)

## 2021-06-08 ENCOUNTER — OFFICE VISIT (OUTPATIENT)
Dept: MEDICAL GROUP | Age: 71
End: 2021-06-08
Payer: MEDICARE

## 2021-06-08 VITALS
WEIGHT: 208 LBS | HEART RATE: 64 BPM | OXYGEN SATURATION: 95 % | SYSTOLIC BLOOD PRESSURE: 118 MMHG | TEMPERATURE: 97.5 F | DIASTOLIC BLOOD PRESSURE: 60 MMHG | HEIGHT: 62 IN | BODY MASS INDEX: 38.28 KG/M2

## 2021-06-08 DIAGNOSIS — N18.30 STAGE 3 CHRONIC KIDNEY DISEASE, UNSPECIFIED WHETHER STAGE 3A OR 3B CKD: ICD-10-CM

## 2021-06-08 DIAGNOSIS — I10 ESSENTIAL HYPERTENSION: ICD-10-CM

## 2021-06-08 DIAGNOSIS — E03.9 HYPOTHYROIDISM, UNSPECIFIED TYPE: ICD-10-CM

## 2021-06-08 PROCEDURE — 99214 OFFICE O/P EST MOD 30 MIN: CPT | Performed by: FAMILY MEDICINE

## 2021-06-08 ASSESSMENT — PATIENT HEALTH QUESTIONNAIRE - PHQ9: CLINICAL INTERPRETATION OF PHQ2 SCORE: 0

## 2021-06-08 ASSESSMENT — FIBROSIS 4 INDEX: FIB4 SCORE: 1.04

## 2021-06-08 NOTE — PROGRESS NOTES
This medical record contains text that has been entered with the assistance of computer voice recognition and dictation software.  Therefore, it may contain unintended errors in text, spelling, punctuation, or grammar      Chief Complaint   Patient presents with   • Follow-Up         Prince Mazariegos is a 71 y.o. female here evaluation and management of: follow up      HPI:     1. Essential hypertension  Hydrochlorothiazide 25 mg p.o. daily  Lisinopril 40 mg daily    Home BP readings--- not monitoring    Today, the patient has been tollerating the BP meds without any issues. The patient denies any chest pain, presyncopal syncopal episodes, headaches, changes in vision.        2. Hypothyroidism, unspecified type  Levothyroxine 75 mcg p.o. daily    The patient denies any excessive fatigue, she has been losing weight through increasing walking daily, she denies any preference for cold or hot temperatures no constipation no diarrhea no palpitations.        3. Stage 3 chronic kidney disease, unspecified whether stage 3a or 3b CKD (HCC)  Patient understands to avoid NSAIDs and maintain blood pressure control  She states she stopped taking her allopurinol so she is no longer on this.    Current medicines (including changes today)  Current Outpatient Medications   Medication Sig Dispense Refill   • levothyroxine (SYNTHROID) 75 MCG Tab Take 1 tablet by mouth every day. 90 tablet 3   • lisinopril (PRINIVIL) 40 MG tablet Take 1 tablet by mouth every day. 90 tablet 3   • metoprolol SR (TOPROL XL) 50 MG TABLET SR 24 HR Take 1 tablet by mouth every day. 90 tablet 3   • rosuvastatin (CRESTOR) 10 MG Tab Take 1 tablet by mouth every evening. 90 tablet 3   • hydroCHLOROthiazide (HYDRODIURIL) 25 MG Tab Take 1 tablet by mouth every evening. 90 tablet 3   • omeprazole (PRILOSEC) 20 MG delayed-release capsule      • acetaminophen (TYLENOL) 500 MG Tab Take 500-1,000 mg by mouth every 6 hours as needed.     • methylPREDNISolone (MEDROL  DOSEPAK) 4 MG Tablet Therapy Pack Follow schedule on package instructions. 21 Tab 0   • CALCIUM PO Take 2 Tabs by mouth every evening.     • acetaminophen (TYLENOL) 325 MG Tab Take 2 Tabs by mouth every 8 hours as needed for Moderate Pain.     • Cholecalciferol (VITAMIN D) 2000 UNIT Tab Take 2,000 Units by mouth every evening.     • Probiotic Product (PROBIOTIC-10 PO) Take 1 Tab by mouth every day.     • polyethylene glycol 3350 (MIRALAX) Powder Take 17 g by mouth every day.     • multivitamin (THERAGRAN) Tab Take 2 Tabs by mouth every evening.     • Fiber Powder Take 1 Each by mouth every day.       No current facility-administered medications for this visit.     She  has a past medical history of Anesthesia, Arrhythmia, Arthritis, Back pain, Breath shortness, Chest pain (May 2016), Chickenpox, DDD (degenerative disc disease), cervical, Disorder of thyroid, Enlarged tonsils, GERD (gastroesophageal reflux disease), [place] measles, Hemorrhoids, High cholesterol, Hypertension, Obesity, Pain, PONV (postoperative nausea and vomiting), Psychiatric disorder, Skin cancer, Sleep apnea, and Snoring.  She  has a past surgical history that includes lumpectomy (Right, 2007); tonsillectomy; other cardiac surgery; other; hysteroscopy with video diagnostic (10/7/08); dilation and curettage (10/7/08); carpal tunnel endoscopic (10/17/2011); carpal tunnel endoscopic (10/28/2011); carpal tunnel release; and pr reconstr total shoulder implant (Right, 8/14/2019).  Social History     Tobacco Use   • Smoking status: Never Smoker   • Smokeless tobacco: Never Used   Vaping Use   • Vaping Use: Never used   Substance Use Topics   • Alcohol use: No     Alcohol/week: 0.0 oz   • Drug use: No     Social History     Social History Narrative   • Not on file     Family History   Problem Relation Age of Onset   • Cancer Mother         Breast cancer   • Cancer Sister         Breast cancer   • Cancer Other    • Cancer Paternal Grandfather         liver  "  • Cancer Father         Pancreatic cancer   • Cancer Maternal Aunt    • Heart Disease Neg Hx      Family Status   Relation Name Status   • Mo     • Sis x1 Alive   • OTHER cousin Alive   • PGFa old age    • Fa     • Bro x1 Alive   • MGMo old age    • MGFa old age    • PGMo old age    • MAunt  Alive   • Neg Hx  (Not Specified)         ROS    The pertinent  ROS findings can be seen in the HPI above.     All other systems reviewed and are negative     Objective:     /60 (BP Location: Right arm, Patient Position: Sitting, BP Cuff Size: Adult long)   Pulse 64   Temp 36.4 °C (97.5 °F) (Temporal)   Ht 1.575 m (5' 2\")   Wt 94.3 kg (208 lb)   SpO2 95%  Body mass index is 38.04 kg/m².      Physical Exam:    Constitutional: Alert, no distress.  Skin: No suspicious lesions  Eye: Equal, round and reactive, conjunctiva clear, lids normal.  ENMT: Lips without lesions, good dentition, oropharynx clear.  Neck: Trachea midline, no masses, no thyromegaly. No cervical or supraclavicular lymphadenopathy.  Respiratory: Unlabored respiratory effort, lungs clear to auscultation, no wheezes, no ronchi.  Cardiovascular: Normal S1, S2, no murmur, no edema  Abdomen: Soft, non-tender, no masses, no hepatosplenomegaly.        Assessment and Plan:   The following treatment plan was discussed      1. Essential hypertension  Patient has been stable with current management  We will make no changes for now      2. Hypothyroidism, unspecified type  Patient has been stable with current management  We will make no changes for now      3. Stage 3 chronic kidney disease, unspecified whether stage 3a or 3b CKD (HCC)  Continue bp control  Avoid Nsaids and all nephrotoxins  Renally dose all medications when indicated          Instructed to Follow up in clinic or ER for worsening symptoms, difficulty breathing, lack of expected recovery, or should new symptoms or problems arise.    Followup: Return " in about 6 months (around 12/8/2021) for Reevaluation, labs.

## 2021-11-08 ENCOUNTER — HOSPITAL ENCOUNTER (OUTPATIENT)
Dept: RADIOLOGY | Facility: MEDICAL CENTER | Age: 71
End: 2021-11-08
Attending: FAMILY MEDICINE
Payer: MEDICARE

## 2021-11-08 DIAGNOSIS — Z12.31 VISIT FOR SCREENING MAMMOGRAM: ICD-10-CM

## 2021-11-08 PROCEDURE — 77063 BREAST TOMOSYNTHESIS BI: CPT

## 2021-12-02 NOTE — ANESTHESIA PREPROCEDURE EVALUATION
Relevant Problems   ANESTHESIA   (+) GENIA (obstructive sleep apnea)      PULMONARY (within normal limits)      NEURO (within normal limits)      CARDIAC   (+) HTN (hypertension)      GI   (+) GERD (gastroesophageal reflux disease)         (+) CKD (chronic kidney disease) stage 3, GFR 30-59 ml/min (HCC)      ENDO   (+) Hypothyroid       Physical Exam    Airway   Mallampati: II  TM distance: >3 FB  Neck ROM: full       Cardiovascular - normal exam  Rhythm: regular  Rate: normal  (-) murmur     Dental - normal exam         Pulmonary - normal exam  Breath sounds clear to auscultation     Abdominal    Neurological - normal exam                 Anesthesia Plan    ASA 3       Plan - general       Airway plan will be ETT        Induction: intravenous          Informed Consent:    Anesthetic plan and risks discussed with patient.    Use of blood products discussed with: patient whom.          Months Of Therapy Completed: 7

## 2022-01-04 ENCOUNTER — TELEPHONE (OUTPATIENT)
Dept: SCHEDULING | Facility: IMAGING CENTER | Age: 72
End: 2022-01-04

## 2022-01-04 NOTE — TELEPHONE ENCOUNTER
Patient needs to know how long she has had her CPAP machine. Please reach out to her.    Thank you,   Agatha JAQUEZ

## 2022-02-01 ENCOUNTER — TELEPHONE (OUTPATIENT)
Dept: SLEEP MEDICINE | Facility: MEDICAL CENTER | Age: 72
End: 2022-02-01

## 2022-02-01 NOTE — TELEPHONE ENCOUNTER
Patient states she has called multiple times. She would like to know how long she has had her CPAP machine so she can figure if she needs to get a new one.    Call back #[phone]

## 2022-02-07 NOTE — TELEPHONE ENCOUNTER
Pt is eligible now as of 12/2021.    LVM letting her know she needs to make an apt ASAP and then we can place a new order.

## 2022-02-10 ENCOUNTER — APPOINTMENT (RX ONLY)
Dept: URBAN - METROPOLITAN AREA CLINIC 20 | Facility: CLINIC | Age: 72
Setting detail: DERMATOLOGY
End: 2022-02-10

## 2022-02-10 DIAGNOSIS — L57.0 ACTINIC KERATOSIS: ICD-10-CM

## 2022-02-10 DIAGNOSIS — D22 MELANOCYTIC NEVI: ICD-10-CM

## 2022-02-10 DIAGNOSIS — L57.8 OTHER SKIN CHANGES DUE TO CHRONIC EXPOSURE TO NONIONIZING RADIATION: ICD-10-CM

## 2022-02-10 DIAGNOSIS — Z09 ENCOUNTER FOR FOLLOW-UP EXAMINATION AFTER COMPLETED TREATMENT FOR CONDITIONS OTHER THAN MALIGNANT NEOPLASM: ICD-10-CM

## 2022-02-10 DIAGNOSIS — L82.1 OTHER SEBORRHEIC KERATOSIS: ICD-10-CM

## 2022-02-10 DIAGNOSIS — L81.4 OTHER MELANIN HYPERPIGMENTATION: ICD-10-CM

## 2022-02-10 DIAGNOSIS — D18.0 HEMANGIOMA: ICD-10-CM

## 2022-02-10 PROBLEM — D22.72 MELANOCYTIC NEVI OF LEFT LOWER LIMB, INCLUDING HIP: Status: ACTIVE | Noted: 2022-02-10

## 2022-02-10 PROBLEM — D22.5 MELANOCYTIC NEVI OF TRUNK: Status: ACTIVE | Noted: 2022-02-10

## 2022-02-10 PROBLEM — D48.5 NEOPLASM OF UNCERTAIN BEHAVIOR OF SKIN: Status: ACTIVE | Noted: 2022-02-10

## 2022-02-10 PROBLEM — D22.71 MELANOCYTIC NEVI OF RIGHT LOWER LIMB, INCLUDING HIP: Status: ACTIVE | Noted: 2022-02-10

## 2022-02-10 PROBLEM — D22.62 MELANOCYTIC NEVI OF LEFT UPPER LIMB, INCLUDING SHOULDER: Status: ACTIVE | Noted: 2022-02-10

## 2022-02-10 PROBLEM — D22.61 MELANOCYTIC NEVI OF RIGHT UPPER LIMB, INCLUDING SHOULDER: Status: ACTIVE | Noted: 2022-02-10

## 2022-02-10 PROBLEM — D18.01 HEMANGIOMA OF SKIN AND SUBCUTANEOUS TISSUE: Status: ACTIVE | Noted: 2022-02-10

## 2022-02-10 PROCEDURE — 17003 DESTRUCT PREMALG LES 2-14: CPT

## 2022-02-10 PROCEDURE — ? COUNSELING

## 2022-02-10 PROCEDURE — 17000 DESTRUCT PREMALG LESION: CPT | Mod: 59

## 2022-02-10 PROCEDURE — ? BIOPSY BY SHAVE METHOD

## 2022-02-10 PROCEDURE — 11102 TANGNTL BX SKIN SINGLE LES: CPT

## 2022-02-10 PROCEDURE — ? LIQUID NITROGEN

## 2022-02-10 PROCEDURE — ? MEDICATION COUNSELING

## 2022-02-10 PROCEDURE — ? ADDITIONAL NOTES

## 2022-02-10 PROCEDURE — 99213 OFFICE O/P EST LOW 20 MIN: CPT | Mod: 25

## 2022-02-10 ASSESSMENT — LOCATION ZONE DERM
LOCATION ZONE: TRUNK
LOCATION ZONE: NOSE
LOCATION ZONE: FACE
LOCATION ZONE: SCALP
LOCATION ZONE: LEG
LOCATION ZONE: ARM
LOCATION ZONE: HAND
LOCATION ZONE: NECK

## 2022-02-10 ASSESSMENT — LOCATION DETAILED DESCRIPTION DERM
LOCATION DETAILED: LEFT PROXIMAL RADIAL DORSAL FOREARM
LOCATION DETAILED: LEFT PROXIMAL CALF
LOCATION DETAILED: NASAL DORSUM
LOCATION DETAILED: LEFT DISTAL POSTERIOR UPPER ARM
LOCATION DETAILED: LEFT SUPERIOR UPPER BACK
LOCATION DETAILED: RIGHT LATERAL EYEBROW
LOCATION DETAILED: RIGHT RADIAL DORSAL HAND
LOCATION DETAILED: RIGHT INFERIOR CENTRAL MALAR CHEEK
LOCATION DETAILED: RIGHT DISTAL POSTERIOR UPPER ARM
LOCATION DETAILED: LEFT RADIAL DORSAL HAND
LOCATION DETAILED: RIGHT PROXIMAL CALF
LOCATION DETAILED: RIGHT INFERIOR ANTERIOR NECK
LOCATION DETAILED: LEFT CENTRAL POSTAURICULAR SKIN
LOCATION DETAILED: RIGHT SUPERIOR UPPER BACK
LOCATION DETAILED: RIGHT INFERIOR MEDIAL UPPER BACK

## 2022-02-10 ASSESSMENT — LOCATION SIMPLE DESCRIPTION DERM
LOCATION SIMPLE: RIGHT ANTERIOR NECK
LOCATION SIMPLE: RIGHT EYEBROW
LOCATION SIMPLE: NOSE
LOCATION SIMPLE: LEFT UPPER BACK
LOCATION SIMPLE: SCALP
LOCATION SIMPLE: RIGHT HAND
LOCATION SIMPLE: LEFT HAND
LOCATION SIMPLE: RIGHT UPPER BACK
LOCATION SIMPLE: LEFT POSTERIOR UPPER ARM
LOCATION SIMPLE: LEFT CALF
LOCATION SIMPLE: RIGHT POSTERIOR UPPER ARM
LOCATION SIMPLE: RIGHT CHEEK
LOCATION SIMPLE: LEFT FOREARM
LOCATION SIMPLE: RIGHT CALF

## 2022-02-10 NOTE — PROCEDURE: ADDITIONAL NOTES
Detail Level: Simple
Render Risk Assessment In Note?: no
Additional Notes: Noted pink undertones, no lesions, instructed to use sunscreen

## 2022-02-10 NOTE — PROCEDURE: MEDICATION COUNSELING
Cosentyx Pregnancy And Lactation Text: This medication is Pregnancy Category B and is considered safe during pregnancy. It is unknown if this medication is excreted in breast milk.
Wartpeel Pregnancy And Lactation Text: This medication is Pregnancy Category X and contraindicated in pregnancy and in women who may become pregnant. It is unknown if this medication is excreted in breast milk.
Dutasteride Pregnancy And Lactation Text: This medication is absolutely contraindicated in women, especially during pregnancy and breast feeding. Feminization of male fetuses is possible if taking while pregnant.
Quinolones Pregnancy And Lactation Text: This medication is Pregnancy Category C and it isn't know if it is safe during pregnancy. It is also excreted in breast milk.
Cyclosporine Counseling:  I discussed with the patient the risks of cyclosporine including but not limited to hypertension, gingival hyperplasia,myelosuppression, immunosuppression, liver damage, kidney damage, neurotoxicity, lymphoma, and serious infections. The patient understands that monitoring is required including baseline blood pressure, CBC, CMP, lipid panel and uric acid, and then 1-2 times monthly CMP and blood pressure.
Skyrizi Pregnancy And Lactation Text: The risk during pregnancy and breastfeeding is uncertain with this medication.
Mirvaso Pregnancy And Lactation Text: This medication has not been assigned a Pregnancy Risk Category. It is unknown if the medication is excreted in breast milk.
Odomzo Pregnancy And Lactation Text: This medication is Pregnancy Category X and is absolutely contraindicated during pregnancy. It is unknown if it is excreted in breast milk.
Metronidazole Pregnancy And Lactation Text: This medication is Pregnancy Category B and considered safe during pregnancy.  It is also excreted in breast milk.
Tranexamic Acid Pregnancy And Lactation Text: It is unknown if this medication is safe during pregnancy or breast feeding.
Terbinafine Pregnancy And Lactation Text: This medication is Pregnancy Category B and is considered safe during pregnancy. It is also excreted in breast milk and breast feeding isn't recommended.
Winlevi Counseling:  I discussed with the patient the risks of topical clascoterone including but not limited to erythema, scaling, itching, and stinging. Patient voiced their understanding.
Quinolones Counseling:  I discussed with the patient the risks of fluoroquinolones including but not limited to GI upset, allergic reaction, drug rash, diarrhea, dizziness, photosensitivity, yeast infections, liver function test abnormalities, tendonitis/tendon rupture.
Cyclophosphamide Pregnancy And Lactation Text: This medication is Pregnancy Category D and it isn't considered safe during pregnancy. This medication is excreted in breast milk.
Calcipotriene Counseling:  I discussed with the patient the risks of calcipotriene including but not limited to erythema, scaling, itching, and irritation.
Erivedge Counseling- I discussed with the patient the risks of Erivedge including but not limited to nausea, vomiting, diarrhea, constipation, weight loss, changes in the sense of taste, decreased appetite, muscle spasms, and hair loss.  The patient verbalized understanding of the proper use and possible adverse effects of Erivedge.  All of the patient's questions and concerns were addressed.
Picato Counseling:  I discussed with the patient the risks of Picato including but not limited to erythema, scaling, itching, weeping, crusting, and pain.
Oral Minoxidil Counseling- I discussed with the patient the risks of oral minoxidil including but not limited to shortness of breath, swelling of the feet or ankles, dizziness, lightheadedness, unwanted hair growth and allergic reaction.  The patient verbalized understanding of the proper use and possible adverse effects of oral minoxidil.  All of the patient's questions and concerns were addressed.
Skyrizi Counseling: I discussed with the patient the risks of risankizumab-rzaa including but not limited to immunosuppression, and serious infections.  The patient understands that monitoring is required including a PPD at baseline and must alert us or the primary physician if symptoms of infection or other concerning signs are noted.
Cosentyx Counseling:  I discussed with the patient the risks of Cosentyx including but not limited to worsening of Crohn's disease, immunosuppression, allergic reactions and infections.  The patient understands that monitoring is required including a PPD at baseline and must alert us or the primary physician if symptoms of infection or other concerning signs are noted.
Minocycline Counseling: Patient advised regarding possible photosensitivity and discoloration of the teeth, skin, lips, tongue and gums.  Patient instructed to avoid sunlight, if possible.  When exposed to sunlight, patients should wear protective clothing, sunglasses, and sunscreen.  The patient was instructed to call the office immediately if the following severe adverse effects occur:  hearing changes, easy bruising/bleeding, severe headache, or vision changes.  The patient verbalized understanding of the proper use and possible adverse effects of minocycline.  All of the patient's questions and concerns were addressed.
Include Pregnancy/Lactation Warning?: No
Benzoyl Peroxide Pregnancy And Lactation Text: This medication is Pregnancy Category C. It is unknown if benzoyl peroxide is excreted in breast milk.
Valtrex Counseling: I discussed with the patient the risks of valacyclovir including but not limited to kidney damage, nausea, vomiting and severe allergy.  The patient understands that if the infection seems to be worsening or is not improving, they are to call.
Minocycline Pregnancy And Lactation Text: This medication is Pregnancy Category D and not consider safe during pregnancy. It is also excreted in breast milk.
Terbinafine Counseling: Patient counseling regarding adverse effects of terbinafine including but not limited to headache, diarrhea, rash, upset stomach, liver function test abnormalities, itching, taste/smell disturbance, nausea, abdominal pain, and flatulence.  There is a rare possibility of liver failure that can occur when taking terbinafine.  The patient understands that a baseline LFT and kidney function test may be required. The patient verbalized understanding of the proper use and possible adverse effects of terbinafine.  All of the patient's questions and concerns were addressed.
Winlevi Pregnancy And Lactation Text: This medication is considered safe during pregnancy and breastfeeding.
Calcipotriene Pregnancy And Lactation Text: This medication has not been proven safe during pregnancy. It is unknown if this medication is excreted in breast milk.
Oral Minoxidil Pregnancy And Lactation Text: This medication should only be used when clearly needed if you are pregnant, attempting to become pregnant or breast feeding.
Picato Pregnancy And Lactation Text: This medication is Pregnancy Category C. It is unknown if this medication is excreted in breast milk.
Cimzia Pregnancy And Lactation Text: This medication crosses the placenta but can be considered safe in certain situations. Cimzia may be excreted in breast milk.
Benzoyl Peroxide Counseling: Patient counseled that medicine may cause skin irritation and bleach clothing.  In the event of skin irritation, the patient was advised to reduce the amount of the drug applied or use it less frequently.   The patient verbalized understanding of the proper use and possible adverse effects of benzoyl peroxide.  All of the patient's questions and concerns were addressed.
Cyclophosphamide Counseling:  I discussed with the patient the risks of cyclophosphamide including but not limited to hair loss, hormonal abnormalities, decreased fertility, abdominal pain, diarrhea, nausea and vomiting, bone marrow suppression and infection. The patient understands that monitoring is required while taking this medication.
Valtrex Pregnancy And Lactation Text: this medication is Pregnancy Category B and is considered safe during pregnancy. This medication is not directly found in breast milk but it's metabolite acyclovir is present.
Finasteride Male Counseling: Finasteride Counseling:  I discussed with the patient the risks of use of finasteride including but not limited to decreased libido, decreased ejaculate volume, gynecomastia, and depression. Women should not handle medication.  All of the patient's questions and concerns were addressed.
Otezla Counseling: The side effects of Otezla were discussed with the patient, including but not limited to worsening or new depression, weight loss, diarrhea, nausea, upper respiratory tract infection, and headache. Patient instructed to call the office should any adverse effect occur.  The patient verbalized understanding of the proper use and possible adverse effects of Otezla.  All the patient's questions and concerns were addressed.
Simponi Counseling:  I discussed with the patient the risks of golimumab including but not limited to myelosuppression, immunosuppression, autoimmune hepatitis, demyelinating diseases, lymphoma, and serious infections.  The patient understands that monitoring is required including a PPD at baseline and must alert us or the primary physician if symptoms of infection or other concerning signs are noted.
Protopic Counseling: Patient may experience a mild burning sensation during topical application. Protopic is not approved in children less than 2 years of age. There have been case reports of hematologic and skin malignancies in patients using topical calcineurin inhibitors although causality is questionable.
Cimzia Counseling:  I discussed with the patient the risks of Cimzia including but not limited to immunosuppression, allergic reactions and infections.  The patient understands that monitoring is required including a PPD at baseline and must alert us or the primary physician if symptoms of infection or other concerning signs are noted.
Ketoconazole Pregnancy And Lactation Text: This medication is Pregnancy Category C and it isn't know if it is safe during pregnancy. It is also excreted in breast milk and breast feeding isn't recommended.
Zyclara Counseling:  I discussed with the patient the risks of imiquimod including but not limited to erythema, scaling, itching, weeping, crusting, and pain.  Patient understands that the inflammatory response to imiquimod is variable from person to person and was educated regarded proper titration schedule.  If flu-like symptoms develop, patient knows to discontinue the medication and contact us.
Cellcept Pregnancy And Lactation Text: This medication is Pregnancy Category D and isn't considered safe during pregnancy. It is unknown if this medication is excreted in breast milk.
5-Fu Counseling: 5-Fluorouracil Counseling:  I discussed with the patient the risks of 5-fluorouracil including but not limited to erythema, scaling, itching, weeping, crusting, and pain.
Finasteride Pregnancy And Lactation Text: This medication is absolutely contraindicated during pregnancy. It is unknown if it is excreted in breast milk.
Protopic Pregnancy And Lactation Text: This medication is Pregnancy Category C. It is unknown if this medication is excreted in breast milk when applied topically.
Ketoconazole Counseling:   Patient counseled regarding improving absorption with orange juice.  Adverse effects include but are not limited to breast enlargement, headache, diarrhea, nausea, upset stomach, liver function test abnormalities, taste disturbance, and stomach pain.  There is a rare possibility of liver failure that can occur when taking ketoconazole. The patient understands that monitoring of LFTs may be required, especially at baseline. The patient verbalized understanding of the proper use and possible adverse effects of ketoconazole.  All of the patient's questions and concerns were addressed.
High Dose Vitamin A Pregnancy And Lactation Text: High dose vitamin A therapy is contraindicated during pregnancy and breast feeding.
Cellcept Counseling:  I discussed with the patient the risks of mycophenolate mofetil including but not limited to infection/immunosuppression, GI upset, hypokalemia, hypercholesterolemia, bone marrow suppression, lymphoproliferative disorders, malignancy, GI ulceration/bleed/perforation, colitis, interstitial lung disease, kidney failure, progressive multifocal leukoencephalopathy, and birth defects.  The patient understands that monitoring is required including a baseline creatinine and regular CBC testing. In addition, patient must alert us immediately if symptoms of infection or other concerning signs are noted.
Otezla Pregnancy And Lactation Text: This medication is Pregnancy Category C and it isn't known if it is safe during pregnancy. It is unknown if it is excreted in breast milk.
Gabapentin Counseling: I discussed with the patient the risks of gabapentin including but not limited to dizziness, somnolence, fatigue and ataxia.
Rhofade Counseling: Rhofade is a topical medication which can decrease superficial blood flow where applied. Side effects are uncommon and include stinging, redness and allergic reactions.
Drysol Counseling:  I discussed with the patient the risks of drysol/aluminum chloride including but not limited to skin rash, itching, irritation, burning.
Oxybutynin Counseling:  I discussed with the patient the risks of oxybutynin including but not limited to skin rash, drowsiness, dry mouth, difficulty urinating, and blurred vision.
Siliq Counseling:  I discussed with the patient the risks of Siliq including but not limited to new or worsening depression, suicidal thoughts and behavior, immunosuppression, malignancy, posterior leukoencephalopathy syndrome, and serious infections.  The patient understands that monitoring is required including a PPD at baseline and must alert us or the primary physician if symptoms of infection or other concerning signs are noted. There is also a special program designed to monitor depression which is required with Siliq.
Azithromycin Counseling:  I discussed with the patient the risks of azithromycin including but not limited to GI upset, allergic reaction, drug rash, diarrhea, and yeast infections.
High Dose Vitamin A Counseling: Side effects reviewed, pt to contact office should one occur.
Gabapentin Pregnancy And Lactation Text: This medication is Pregnancy Category C and isn't considered safe during pregnancy. It is excreted in breast milk.
Itraconazole Counseling:  I discussed with the patient the risks of itraconazole including but not limited to liver damage, nausea/vomiting, neuropathy, and severe allergy.  The patient understands that this medication is best absorbed when taken with acidic beverages such as non-diet cola or ginger ale.  The patient understands that monitoring is required including baseline LFTs and repeat LFTs at intervals.  The patient understands that they are to contact us or the primary physician if concerning signs are noted.
Opioid Counseling: I discussed with the patient the potential side effects of opioids including but not limited to addiction, altered mental status, and depression. I stressed avoiding alcohol, benzodiazepines, muscle relaxants and sleep aids unless specifically okayed by a physician. The patient verbalized understanding of the proper use and possible adverse effects of opioids. All of the patient's questions and concerns were addressed. They were instructed to flush the remaining pills down the toilet if they did not need them for pain.
Azathioprine Counseling:  I discussed with the patient the risks of azathioprine including but not limited to myelosuppression, immunosuppression, hepatotoxicity, lymphoma, and infections.  The patient understands that monitoring is required including baseline LFTs, Creatinine, possible TPMP genotyping and weekly CBCs for the first month and then every 2 weeks thereafter.  The patient verbalized understanding of the proper use and possible adverse effects of azathioprine.  All of the patient's questions and concerns were addressed.
Drysol Pregnancy And Lactation Text: This medication is considered safe during pregnancy and breast feeding.
Oxybutynin Pregnancy And Lactation Text: This medication is Pregnancy Category B and is considered safe during pregnancy. It is unknown if it is excreted in breast milk.
Rituxan Pregnancy And Lactation Text: This medication is Pregnancy Category C and it isn't know if it is safe during pregnancy. It is unknown if this medication is excreted in breast milk but similar antibodies are known to be excreted.
Azithromycin Pregnancy And Lactation Text: This medication is considered safe during pregnancy and is also secreted in breast milk.
Isotretinoin Pregnancy And Lactation Text: This medication is Pregnancy Category X and is considered extremely dangerous during pregnancy. It is unknown if it is excreted in breast milk.
Ivermectin Pregnancy And Lactation Text: This medication is Pregnancy Category C and it isn't known if it is safe during pregnancy. It is also excreted in breast milk.
Griseofulvin Pregnancy And Lactation Text: This medication is Pregnancy Category X and is known to cause serious birth defects. It is unknown if this medication is excreted in breast milk but breast feeding should be avoided.
Opioid Pregnancy And Lactation Text: These medications can lead to premature delivery and should be avoided during pregnancy. These medications are also present in breast milk in small amounts.
Glycopyrrolate Counseling:  I discussed with the patient the risks of glycopyrrolate including but not limited to skin rash, drowsiness, dry mouth, difficulty urinating, and blurred vision.
Elidel Counseling: Patient may experience a mild burning sensation during topical application. Elidel is not approved in children less than 2 years of age. There have been case reports of hematologic and skin malignancies in patients using topical calcineurin inhibitors although causality is questionable.
Bactrim Counseling:  I discussed with the patient the risks of sulfa antibiotics including but not limited to GI upset, allergic reaction, drug rash, diarrhea, dizziness, photosensitivity, and yeast infections.  Rarely, more serious reactions can occur including but not limited to aplastic anemia, agranulocytosis, methemoglobinemia, blood dyscrasias, liver or kidney failure, lung infiltrates or desquamative/blistering drug rashes.
Solaraze Counseling:  I discussed with the patient the risks of Solaraze including but not limited to erythema, scaling, itching, weeping, crusting, and pain.
Rituxan Counseling:  I discussed with the patient the risks of Rituxan infusions. Side effects can include infusion reactions, severe drug rashes including mucocutaneous reactions, reactivation of latent hepatitis and other infections and rarely progressive multifocal leukoencephalopathy.  All of the patient's questions and concerns were addressed.
Propranolol Counseling:  I discussed with the patient the risks of propranolol including but not limited to low heart rate, low blood pressure, low blood sugar, restlessness and increased cold sensitivity. They should call the office if they experience any of these side effects.
Isotretinoin Counseling: Patient should get monthly blood tests, not donate blood, not drive at night if vision affected, not share medication, and not undergo elective surgery for 6 months after tx completed. Side effects reviewed, pt to contact office should one occur.
Griseofulvin Counseling:  I discussed with the patient the risks of griseofulvin including but not limited to photosensitivity, cytopenia, liver damage, nausea/vomiting and severe allergy.  The patient understands that this medication is best absorbed when taken with a fatty meal (e.g., ice cream or french fries).
Glycopyrrolate Pregnancy And Lactation Text: This medication is Pregnancy Category B and is considered safe during pregnancy. It is unknown if it is excreted breast milk.
Solaraze Pregnancy And Lactation Text: This medication is Pregnancy Category B and is considered safe. There is some data to suggest avoiding during the third trimester. It is unknown if this medication is excreted in breast milk.
Ivermectin Counseling:  Patient instructed to take medication on an empty stomach with a full glass of water.  Patient informed of potential adverse effects including but not limited to nausea, diarrhea, dizziness, itching, and swelling of the extremities or lymph nodes.  The patient verbalized understanding of the proper use and possible adverse effects of ivermectin.  All of the patient's questions and concerns were addressed.
Bactrim Pregnancy And Lactation Text: This medication is Pregnancy Category D and is known to cause fetal risk.  It is also excreted in breast milk.
Bexarotene Pregnancy And Lactation Text: This medication is Pregnancy Category X and should not be given to women who are pregnant or may become pregnant. This medication should not be used if you are breast feeding.
Propranolol Pregnancy And Lactation Text: This medication is Pregnancy Category C and it isn't known if it is safe during pregnancy. It is excreted in breast milk.
Xolair Pregnancy And Lactation Text: This medication is Pregnancy Category B and is considered safe during pregnancy. This medication is excreted in breast milk.
Topical Clindamycin Counseling: Patient counseled that this medication may cause skin irritation or allergic reactions.  In the event of skin irritation, the patient was advised to reduce the amount of the drug applied or use it less frequently.   The patient verbalized understanding of the proper use and possible adverse effects of clindamycin.  All of the patient's questions and concerns were addressed.
Arava Counseling:  Patient counseled regarding adverse effects of Arava including but not limited to nausea, vomiting, abnormalities in liver function tests. Patients may develop mouth sores, rash, diarrhea, and abnormalities in blood counts. The patient understands that monitoring is required including LFTs and blood counts.  There is a rare possibility of scarring of the liver and lung problems that can occur when taking methotrexate. Persistent nausea, loss of appetite, pale stools, dark urine, cough, and shortness of breath should be reported immediately. Patient advised to discontinue Arava treatment and consult with a physician prior to attempting conception. The patient will have to undergo a treatment to eliminate Arava from the body prior to conception.
Hydroxychloroquine Counseling:  I discussed with the patient that a baseline ophthalmologic exam is needed at the start of therapy and every year thereafter while on therapy. A CBC may also be warranted for monitoring.  The side effects of this medication were discussed with the patient, including but not limited to agranulocytosis, aplastic anemia, seizures, rashes, retinopathy, and liver toxicity. Patient instructed to call the office should any adverse effect occur.  The patient verbalized understanding of the proper use and possible adverse effects of Plaquenil.  All the patient's questions and concerns were addressed.
Infliximab Counseling:  I discussed with the patient the risks of infliximab including but not limited to myelosuppression, immunosuppression, autoimmune hepatitis, demyelinating diseases, lymphoma, and serious infections.  The patient understands that monitoring is required including a PPD at baseline and must alert us or the primary physician if symptoms of infection or other concerning signs are noted.
Topical Retinoid counseling:  Patient advised to apply a pea-sized amount only at bedtime and wait 30 minutes after washing their face before applying.  If too drying, patient may add a non-comedogenic moisturizer. The patient verbalized understanding of the proper use and possible adverse effects of retinoids.  All of the patient's questions and concerns were addressed.
Bexarotene Counseling:  I discussed with the patient the risks of bexarotene including but not limited to hair loss, dry lips/skin/eyes, liver abnormalities, hyperlipidemia, pancreatitis, depression/suicidal ideation, photosensitivity, drug rash/allergic reactions, hypothyroidism, anemia, leukopenia, infection, cataracts, and teratogenicity.  Patient understands that they will need regular blood tests to check lipid profile, liver function tests, white blood cell count, thyroid function tests and pregnancy test if applicable.
Xolair Counseling:  Patient informed of potential adverse effects including but not limited to fever, muscle aches, rash and allergic reactions.  The patient verbalized understanding of the proper use and possible adverse effects of Xolair.  All of the patient's questions and concerns were addressed.
Eucrisa Counseling: Patient may experience a mild burning sensation during topical application. Eucrisa is not approved in children less than 2 years of age.
Birth Control Pills Counseling: Birth Control Pill Counseling: I discussed with the patient the potential side effects of OCPs including but not limited to increased risk of stroke, heart attack, thrombophlebitis, deep venous thrombosis, hepatic adenomas, breast changes, GI upset, headaches, and depression.  The patient verbalized understanding of the proper use and possible adverse effects of OCPs. All of the patient's questions and concerns were addressed.
Cephalexin Counseling: I counseled the patient regarding use of cephalexin as an antibiotic for prophylactic and/or therapeutic purposes. Cephalexin (commonly prescribed under brand name Keflex) is a cephalosporin antibiotic which is active against numerous classes of bacteria, including most skin bacteria. Side effects may include nausea, diarrhea, gastrointestinal upset, rash, hives, yeast infections, and in rare cases, hepatitis, kidney disease, seizures, fever, confusion, neurologic symptoms, and others. Patients with severe allergies to penicillin medications are cautioned that there is about a 10% incidence of cross-reactivity with cephalosporins. When possible, patients with penicillin allergies should use alternatives to cephalosporins for antibiotic therapy.
Hydroxychloroquine Pregnancy And Lactation Text: This medication has been shown to cause fetal harm but it isn't assigned a Pregnancy Risk Category. There are small amounts excreted in breast milk.
Albendazole Counseling:  I discussed with the patient the risks of albendazole including but not limited to cytopenia, kidney damage, nausea/vomiting and severe allergy.  The patient understands that this medication is being used in an off-label manner.
Fluconazole Counseling:  Patient counseled regarding adverse effects of fluconazole including but not limited to headache, diarrhea, nausea, upset stomach, liver function test abnormalities, taste disturbance, and stomach pain.  There is a rare possibility of liver failure that can occur when taking fluconazole.  The patient understands that monitoring of LFTs and kidney function test may be required, especially at baseline. The patient verbalized understanding of the proper use and possible adverse effects of fluconazole.  All of the patient's questions and concerns were addressed.
Acitretin Pregnancy And Lactation Text: This medication is Pregnancy Category X and should not be given to women who are pregnant or may become pregnant in the future. This medication is excreted in breast milk.
Eucrisa Pregnancy And Lactation Text: This medication has not been assigned a Pregnancy Risk Category but animal studies failed to show danger with the topical medication. It is unknown if the medication is excreted in breast milk.
Xelrulaz Pregnancy And Lactation Text: This medication is Pregnancy Category D and is not considered safe during pregnancy.  The risk during breast feeding is also uncertain.
Cephalexin Pregnancy And Lactation Text: This medication is Pregnancy Category B and considered safe during pregnancy.  It is also excreted in breast milk but can be used safely for shorter doses.
Birth Control Pills Pregnancy And Lactation Text: This medication should be avoided if pregnant and for the first 30 days post-partum.
Topical Sulfur Applications Counseling: Topical Sulfur Counseling: Patient counseled that this medication may cause skin irritation or allergic reactions.  In the event of skin irritation, the patient was advised to reduce the amount of the drug applied or use it less frequently.   The patient verbalized understanding of the proper use and possible adverse effects of topical sulfur application.  All of the patient's questions and concerns were addressed.
Libtayo Counseling- I discussed with the patient the risks of Libtayo including but not limited to nausea, vomiting, diarrhea, and bone or muscle pain.  The patient verbalized understanding of the proper use and possible adverse effects of Libtayo.  All of the patient's questions and concerns were addressed.
Tazorac Counseling:  Patient advised that medication is irritating and drying.  Patient may need to apply sparingly and wash off after an hour before eventually leaving it on overnight.  The patient verbalized understanding of the proper use and possible adverse effects of tazorac.  All of the patient's questions and concerns were addressed.
Ilumya Counseling: I discussed with the patient the risks of tildrakizumab including but not limited to immunosuppression, malignancy, posterior leukoencephalopathy syndrome, and serious infections.  The patient understands that monitoring is required including a PPD at baseline and must alert us or the primary physician if symptoms of infection or other concerning signs are noted.
Clofazimine Counseling:  I discussed with the patient the risks of clofazimine including but not limited to skin and eye pigmentation, liver damage, nausea/vomiting, gastrointestinal bleeding and allergy.
Acitretin Counseling:  I discussed with the patient the risks of acitretin including but not limited to hair loss, dry lips/skin/eyes, liver damage, hyperlipidemia, depression/suicidal ideation, photosensitivity.  Serious rare side effects can include but are not limited to pancreatitis, pseudotumor cerebri, bony changes, clot formation/stroke/heart attack.  Patient understands that alcohol is contraindicated since it can result in liver toxicity and significantly prolong the elimination of the drug by many years.
Xeljanz Counseling: I discussed with the patient the risks of Xeljanz therapy including increased risk of infection, liver issues, headache, diarrhea, or cold symptoms. Live vaccines should be avoided. They were instructed to call if they have any problems.
Hydroquinone Counseling:  Patient advised that medication may result in skin irritation, lightening (hypopigmentation), dryness, and burning.  In the event of skin irritation, the patient was advised to reduce the amount of the drug applied or use it less frequently.  Rarely, spots that are treated with hydroquinone can become darker (pseudoochronosis).  Should this occur, patient instructed to stop medication and call the office. The patient verbalized understanding of the proper use and possible adverse effects of hydroquinone.  All of the patient's questions and concerns were addressed.
Spironolactone Counseling: Patient advised regarding risks of diarrhea, abdominal pain, hyperkalemia, birth defects (for female patients), liver toxicity and renal toxicity. The patient may need blood work to monitor liver and kidney function and potassium levels while on therapy. The patient verbalized understanding of the proper use and possible adverse effects of spironolactone.  All of the patient's questions and concerns were addressed.
Clindamycin Counseling: I counseled the patient regarding use of clindamycin as an antibiotic for prophylactic and/or therapeutic purposes. Clindamycin is active against numerous classes of bacteria, including skin bacteria. Side effects may include nausea, diarrhea, gastrointestinal upset, rash, hives, yeast infections, and in rare cases, colitis.
Topical Sulfur Applications Pregnancy And Lactation Text: This medication is Pregnancy Category C and has an unknown safety profile during pregnancy. It is unknown if this topical medication is excreted in breast milk.
Libtayo Pregnancy And Lactation Text: This medication is contraindicated in pregnancy and when breast feeding.
Tazorac Pregnancy And Lactation Text: This medication is not safe during pregnancy. It is unknown if this medication is excreted in breast milk.
Hydroxyzine Pregnancy And Lactation Text: This medication is not safe during pregnancy and should not be taken. It is also excreted in breast milk and breast feeding isn't recommended.
Spironolactone Pregnancy And Lactation Text: This medication can cause feminization of the male fetus and should be avoided during pregnancy. The active metabolite is also found in breast milk.
Clindamycin Pregnancy And Lactation Text: This medication can be used in pregnancy if certain situations. Clindamycin is also present in breast milk.
Hydroxyzine Counseling: Patient advised that the medication is sedating and not to drive a car after taking this medication.  Patient informed of potential adverse effects including but not limited to dry mouth, urinary retention, and blurry vision.  The patient verbalized understanding of the proper use and possible adverse effects of hydroxyzine.  All of the patient's questions and concerns were addressed.
Tetracycline Counseling: Patient counseled regarding possible photosensitivity and increased risk for sunburn.  Patient instructed to avoid sunlight, if possible.  When exposed to sunlight, patients should wear protective clothing, sunglasses, and sunscreen.  The patient was instructed to call the office immediately if the following severe adverse effects occur:  hearing changes, easy bruising/bleeding, severe headache, or vision changes.  The patient verbalized understanding of the proper use and possible adverse effects of tetracycline.  All of the patient's questions and concerns were addressed. Patient understands to avoid pregnancy while on therapy due to potential birth defects.
Colchicine Counseling:  Patient counseled regarding adverse effects including but not limited to stomach upset (nausea, vomiting, stomach pain, or diarrhea).  Patient instructed to limit alcohol consumption while taking this medication.  Colchicine may reduce blood counts especially with prolonged use.  The patient understands that monitoring of kidney function and blood counts may be required, especially at baseline. The patient verbalized understanding of the proper use and possible adverse effects of colchicine.  All of the patient's questions and concerns were addressed.
Niacinamide Counseling: I recommended taking niacin or niacinamide, also know as vitamin B3, twice daily. Recent evidence suggests that taking vitamin B3 (500 mg twice daily) can reduce the risk of actinic keratoses and non-melanoma skin cancers. Side effects of vitamin B3 include flushing and headache.
Tremfya Counseling: I discussed with the patient the risks of guselkumab including but not limited to immunosuppression, serious infections, worsening of inflammatory bowel disease and drug reactions.  The patient understands that monitoring is required including a PPD at baseline and must alert us or the primary physician if symptoms of infection or other concerning signs are noted.
Imiquimod Counseling:  I discussed with the patient the risks of imiquimod including but not limited to erythema, scaling, itching, weeping, crusting, and pain.  Patient understands that the inflammatory response to imiquimod is variable from person to person and was educated regarded proper titration schedule.  If flu-like symptoms develop, patient knows to discontinue the medication and contact us.
Doxycycline Counseling:  Patient counseled regarding possible photosensitivity and increased risk for sunburn.  Patient instructed to avoid sunlight, if possible.  When exposed to sunlight, patients should wear protective clothing, sunglasses, and sunscreen.  The patient was instructed to call the office immediately if the following severe adverse effects occur:  hearing changes, easy bruising/bleeding, severe headache, or vision changes.  The patient verbalized understanding of the proper use and possible adverse effects of doxycycline.  All of the patient's questions and concerns were addressed.
Humira Counseling:  I discussed with the patient the risks of adalimumab including but not limited to myelosuppression, immunosuppression, autoimmune hepatitis, demyelinating diseases, lymphoma, and serious infections.  The patient understands that monitoring is required including a PPD at baseline and must alert us or the primary physician if symptoms of infection or other concerning signs are noted.
Prednisone Pregnancy And Lactation Text: This medication is Pregnancy Category C and it isn't know if it is safe during pregnancy. This medication is excreted in breast milk.
SSKI Counseling:  I discussed with the patient the risks of SSKI including but not limited to thyroid abnormalities, metallic taste, GI upset, fever, headache, acne, arthralgias, paraesthesias, lymphadenopathy, easy bleeding, arrhythmias, and allergic reaction.
Niacinamide Pregnancy And Lactation Text: These medications are considered safe during pregnancy.
Doxepin Pregnancy And Lactation Text: This medication is Pregnancy Category C and it isn't known if it is safe during pregnancy. It is also excreted in breast milk and breast feeding isn't recommended.
Doxycycline Pregnancy And Lactation Text: This medication is Pregnancy Category D and not consider safe during pregnancy. It is also excreted in breast milk but is considered safe for shorter treatment courses.
Prednisone Counseling:  I discussed with the patient the risks of prolonged use of prednisone including but not limited to weight gain, insomnia, osteoporosis, mood changes, diabetes, susceptibility to infection, glaucoma and high blood pressure.  In cases where prednisone use is prolonged, patients should be monitored with blood pressure checks, serum glucose levels and an eye exam.  Additionally, the patient may need to be placed on GI prophylaxis, PCP prophylaxis, and calcium and vitamin D supplementation and/or a bisphosphonate.  The patient verbalized understanding of the proper use and the possible adverse effects of prednisone.  All of the patient's questions and concerns were addressed.
Sski Pregnancy And Lactation Text: This medication is Pregnancy Category D and isn't considered safe during pregnancy. It is excreted in breast milk.
Dapsone Counseling: I discussed with the patient the risks of dapsone including but not limited to hemolytic anemia, agranulocytosis, rashes, methemoglobinemia, kidney failure, peripheral neuropathy, headaches, GI upset, and liver toxicity.  Patients who start dapsone require monitoring including baseline LFTs and weekly CBCs for the first month, then every month thereafter.  The patient verbalized understanding of the proper use and possible adverse effects of dapsone.  All of the patient's questions and concerns were addressed.
Sarecycline Counseling: Patient advised regarding possible photosensitivity and discoloration of the teeth, skin, lips, tongue and gums.  Patient instructed to avoid sunlight, if possible.  When exposed to sunlight, patients should wear protective clothing, sunglasses, and sunscreen.  The patient was instructed to call the office immediately if the following severe adverse effects occur:  hearing changes, easy bruising/bleeding, severe headache, or vision changes.  The patient verbalized understanding of the proper use and possible adverse effects of sarecycline.  All of the patient's questions and concerns were addressed.
Taltz Counseling: I discussed with the patient the risks of ixekizumab including but not limited to immunosuppression, serious infections, worsening of inflammatory bowel disease and drug reactions.  The patient understands that monitoring is required including a PPD at baseline and must alert us or the primary physician if symptoms of infection or other concerning signs are noted.
Minoxidil Counseling: Minoxidil is a topical medication which can increase blood flow where it is applied. It is uncertain how this medication increases hair growth. Side effects are uncommon and include stinging and allergic reactions.
Nsaids Counseling: NSAID Counseling: I discussed with the patient that NSAIDs should be taken with food. Prolonged use of NSAIDs can result in the development of stomach ulcers.  Patient advised to stop taking NSAIDs if abdominal pain occurs.  The patient verbalized understanding of the proper use and possible adverse effects of NSAIDs.  All of the patient's questions and concerns were addressed.
Enbrel Counseling:  I discussed with the patient the risks of etanercept including but not limited to myelosuppression, immunosuppression, autoimmune hepatitis, demyelinating diseases, lymphoma, and infections.  The patient understands that monitoring is required including a PPD at baseline and must alert us or the primary physician if symptoms of infection or other concerning signs are noted.
Doxepin Counseling:  Patient advised that the medication is sedating and not to drive a car after taking this medication. Patient informed of potential adverse effects including but not limited to dry mouth, urinary retention, and blurry vision.  The patient verbalized understanding of the proper use and possible adverse effects of doxepin.  All of the patient's questions and concerns were addressed.
Erythromycin Counseling:  I discussed with the patient the risks of erythromycin including but not limited to GI upset, allergic reaction, drug rash, diarrhea, increase in liver enzymes, and yeast infections.
Methotrexate Pregnancy And Lactation Text: This medication is Pregnancy Category X and is known to cause fetal harm. This medication is excreted in breast milk.
Thalidomide Counseling: I discussed with the patient the risks of thalidomide including but not limited to birth defects, anxiety, weakness, chest pain, dizziness, cough and severe allergy.
Rifampin Pregnancy And Lactation Text: This medication is Pregnancy Category C and it isn't know if it is safe during pregnancy. It is also excreted in breast milk and should not be used if you are breast feeding.
Dapsone Pregnancy And Lactation Text: This medication is Pregnancy Category C and is not considered safe during pregnancy or breast feeding.
Nsaids Pregnancy And Lactation Text: These medications are considered safe up to 30 weeks gestation. It is excreted in breast milk.
Dupixent Pregnancy And Lactation Text: This medication likely crosses the placenta but the risk for the fetus is uncertain. This medication is excreted in breast milk.
Methotrexate Counseling:  Patient counseled regarding adverse effects of methotrexate including but not limited to nausea, vomiting, abnormalities in liver function tests. Patients may develop mouth sores, rash, diarrhea, and abnormalities in blood counts. The patient understands that monitoring is required including LFT's and blood counts.  There is a rare possibility of scarring of the liver and lung problems that can occur when taking methotrexate. Persistent nausea, loss of appetite, pale stools, dark urine, cough, and shortness of breath should be reported immediately. Patient advised to discontinue methotrexate treatment at least three months before attempting to become pregnant.  I discussed the need for folate supplements while taking methotrexate.  These supplements can decrease side effects during methotrexate treatment. The patient verbalized understanding of the proper use and possible adverse effects of methotrexate.  All of the patient's questions and concerns were addressed.
Erythromycin Pregnancy And Lactation Text: This medication is Pregnancy Category B and is considered safe during pregnancy. It is also excreted in breast milk.
Rifampin Counseling: I discussed with the patient the risks of rifampin including but not limited to liver damage, kidney damage, red-orange body fluids, nausea/vomiting and severe allergy.
Odomzo Counseling- I discussed with the patient the risks of Odomzo including but not limited to nausea, vomiting, diarrhea, constipation, weight loss, changes in the sense of taste, decreased appetite, muscle spasms, and hair loss.  The patient verbalized understanding of the proper use and possible adverse effects of Odomzo.  All of the patient's questions and concerns were addressed.
Dupixent Counseling: I discussed with the patient the risks of dupilumab including but not limited to eye infection and irritation, cold sores, injection site reactions, worsening of asthma, allergic reactions and increased risk of parasitic infection.  Live vaccines should be avoided while taking dupilumab. Dupilumab will also interact with certain medications such as warfarin and cyclosporine. The patient understands that monitoring is required and they must alert us or the primary physician if symptoms of infection or other concerning signs are noted.
Cimetidine Counseling:  I discussed with the patient the risks of Cimetidine including but not limited to gynecomastia, headache, diarrhea, nausea, drowsiness, arrhythmias, pancreatitis, skin rashes, psychosis, bone marrow suppression and kidney toxicity.
Wartpeel Counseling:  I discussed with the patient the risks of Wartpeel including but not limited to erythema, scaling, itching, weeping, crusting, and pain.
Dutasteride Male Counseling: Dustasteride Counseling:  I discussed with the patient the risks of use of dutasteride including but not limited to decreased libido, decreased ejaculate volume, and gynecomastia. Women who can become pregnant should not handle medication.  All of the patient's questions and concerns were addressed.
Carac Counseling:  I discussed with the patient the risks of Carac including but not limited to erythema, scaling, itching, weeping, crusting, and pain.
Detail Level: Simple
Mirvaso Counseling: Mirvaso is a topical medication which can decrease superficial blood flow where applied. Side effects are uncommon and include stinging, redness and allergic reactions.
Tranexamic Acid Counseling:  Patient advised of the small risk of bleeding problems with tranexamic acid. They were also instructed to call if they developed any nausea, vomiting or diarrhea. All of the patient's questions and concerns were addressed.
Stelara Counseling:  I discussed with the patient the risks of ustekinumab including but not limited to immunosuppression, malignancy, posterior leukoencephalopathy syndrome, and serious infections.  The patient understands that monitoring is required including a PPD at baseline and must alert us or the primary physician if symptoms of infection or other concerning signs are noted.
Metronidazole Counseling:  I discussed with the patient the risks of metronidazole including but not limited to seizures, nausea/vomiting, a metallic taste in the mouth, nausea/vomiting and severe allergy.

## 2022-03-04 ENCOUNTER — HOSPITAL ENCOUNTER (OUTPATIENT)
Dept: RADIOLOGY | Facility: MEDICAL CENTER | Age: 72
End: 2022-03-04
Attending: FAMILY MEDICINE
Payer: MEDICARE

## 2022-03-04 ENCOUNTER — OFFICE VISIT (OUTPATIENT)
Dept: MEDICAL GROUP | Age: 72
End: 2022-03-04
Payer: MEDICARE

## 2022-03-04 VITALS
BODY MASS INDEX: 38.24 KG/M2 | TEMPERATURE: 98 F | HEART RATE: 68 BPM | WEIGHT: 207.8 LBS | DIASTOLIC BLOOD PRESSURE: 62 MMHG | RESPIRATION RATE: 16 BRPM | HEIGHT: 62 IN | SYSTOLIC BLOOD PRESSURE: 100 MMHG | OXYGEN SATURATION: 93 %

## 2022-03-04 DIAGNOSIS — M79.651 RIGHT THIGH PAIN: ICD-10-CM

## 2022-03-04 PROCEDURE — 93971 EXTREMITY STUDY: CPT | Mod: RT

## 2022-03-04 PROCEDURE — 99214 OFFICE O/P EST MOD 30 MIN: CPT | Performed by: FAMILY MEDICINE

## 2022-03-04 ASSESSMENT — FIBROSIS 4 INDEX: FIB4 SCORE: 1.04

## 2022-03-04 ASSESSMENT — PATIENT HEALTH QUESTIONNAIRE - PHQ9: CLINICAL INTERPRETATION OF PHQ2 SCORE: 0

## 2022-03-04 NOTE — PROGRESS NOTES
This medical record contains text that has been entered with the assistance of computer voice recognition and dictation software.  Therefore, it may contain unintended errors in text, spelling, punctuation, or grammar      Chief Complaint   Patient presents with   • Leg Pain     Rt upper leg x 2 weeks         Prince Mazariegos is a 71 y.o. female here evaluation and management of: Right leg pain      HPI:     1. Right thigh pain  NEW UNDIAGNOSED PROBLEM    Prince is a very pleasant 71-year-old female with a significant past medical history of morbid obesity rheumatoid arthritis who presents to clinic with a chief complaint of right thigh pain.  She started states started about 2 weeks ago he seems to improve it cold seems to make it worse.  She denies any long periods of immobility.  She states she is walking still even though it hurts when she walks it gets better when it warms up.  She denies any long flights or long road trips recently.  She denies any chest pain or shortness of breath no back pain.    Current medicines (including changes today)  Current Outpatient Medications   Medication Sig Dispense Refill   • levothyroxine (SYNTHROID) 75 MCG Tab Take 1 tablet by mouth every day. 90 tablet 3   • lisinopril (PRINIVIL) 40 MG tablet Take 1 tablet by mouth every day. 90 tablet 3   • metoprolol SR (TOPROL XL) 50 MG TABLET SR 24 HR Take 1 tablet by mouth every day. 90 tablet 3   • rosuvastatin (CRESTOR) 10 MG Tab Take 1 tablet by mouth every evening. 90 tablet 3   • hydroCHLOROthiazide (HYDRODIURIL) 25 MG Tab Take 1 tablet by mouth every evening. 90 tablet 3   • omeprazole (PRILOSEC) 20 MG delayed-release capsule      • acetaminophen (TYLENOL) 500 MG Tab Take 500-1,000 mg by mouth every 6 hours as needed.     • methylPREDNISolone (MEDROL DOSEPAK) 4 MG Tablet Therapy Pack Follow schedule on package instructions. 21 Tab 0   • CALCIUM PO Take 2 Tabs by mouth every evening.     • Cholecalciferol (VITAMIN D) 2000 UNIT  Tab Take 2,000 Units by mouth every evening.     • Probiotic Product (PROBIOTIC-10 PO) Take 1 Tab by mouth every day.     • polyethylene glycol 3350 (MIRALAX) 17 GM/SCOOP Powder Take 17 g by mouth every day.     • multivitamin (THERAGRAN) Tab Take 2 Tabs by mouth every evening.     • Fiber Powder Take 1 Each by mouth every day.       No current facility-administered medications for this visit.     She  has a past medical history of Anesthesia, Arrhythmia, Arthritis, Back pain, Breath shortness, Chest pain (May 2016), Chickenpox, DDD (degenerative disc disease), cervical, Disorder of thyroid, Enlarged tonsils, GERD (gastroesophageal reflux disease), [place] measles, Hemorrhoids, High cholesterol, Hypertension, Obesity, Pain, PONV (postoperative nausea and vomiting), Psychiatric disorder, Skin cancer, Sleep apnea, and Snoring.  She  has a past surgical history that includes lumpectomy (Right, ); tonsillectomy; other cardiac surgery; other; hysteroscopy with video diagnostic (10/7/08); dilation and curettage (10/7/08); carpal tunnel endoscopic (10/17/2011); carpal tunnel endoscopic (10/28/2011); carpal tunnel release; and pr reconstr total shoulder implant (Right, 2019).  Social History     Tobacco Use   • Smoking status: Never Smoker   • Smokeless tobacco: Never Used   Vaping Use   • Vaping Use: Never used   Substance Use Topics   • Alcohol use: No     Alcohol/week: 0.0 oz   • Drug use: No     Social History     Social History Narrative   • Not on file     Family History   Problem Relation Age of Onset   • Cancer Mother         Breast cancer   • Cancer Sister         Breast cancer   • Cancer Other    • Cancer Paternal Grandfather         liver   • Cancer Father         Pancreatic cancer   • Cancer Maternal Aunt    • Heart Disease Neg Hx      Family Status   Relation Name Status   • Mo     • Sis x1 Alive   • OTHER cousin Alive   • PGFa old age    • Fa     • Bro x1 Alive   • MGMo old age  "   • MGFa old age    • PGMo old age    • MAunt  Alive   • Neg Hx  (Not Specified)         ROS    The pertinent  ROS findings can be seen in the HPI above.     All other systems reviewed and are negative     Objective:     /62 (BP Location: Left arm, Patient Position: Sitting, BP Cuff Size: Large adult)   Pulse 68   Temp 36.7 °C (98 °F) (Temporal)   Resp 16   Ht 1.575 m (5' 2\")   Wt 94.3 kg (207 lb 12.8 oz)   SpO2 93%  Body mass index is 38.01 kg/m².      Physical Exam:    Constitutional: Alert, no distress.  Skin: No suspicious lesions  Eye: Equal, round and reactive, conjunctiva clear, lids normal.  ENMT: Lips without lesions, good dentition, oropharynx clear.  Neck: Trachea midline, no masses, no thyromegaly. No cervical or supraclavicular lymphadenopathy.  Respiratory: Unlabored respiratory effort, lungs clear to auscultation, no wheezes, no ronchi.  Cardiovascular: Normal S1, S2, no murmur, no edema  Abdomen: Soft, non-tender, no masses, no hepatosplenomegaly.  Extremities--tender to touch over the tensor fasciae latae on the right, no right lower extremity calf pain, no tenderness      Assessment and Plan:   The following treatment plan was discussed      1. Right thigh pain    Obtain an ultrasound to evaluate for DVT    - US-EXTREMITY VENOUS LOWER UNILAT RIGHT; Future     at 1342     PROCEDURES:   Right lower extremity venous duplex imaging.    The following venous structures were evaluated: common femoral, deep    femoral, proximal great saphenous, femoral, popliteal, peroneal, and    posterior tibial veins.    Serial compression, color, and spectral Doppler flow evaluations were    performed.      FINDINGS:   Right lower extremity-   No evidence of deep venous thrombosis.    All veins demonstrate complete color filling and compressibility with    normal venous flow dynamics including spontaneous flow and respiratory    phasicity.       Flow was evaluated in " the contralateral common femoral vein and normal    venous flow dynamics including spontaneous flow and respiratory phasic    variation were demonstrated.        Instructed to Follow up in clinic or ER for worsening symptoms, difficulty breathing, lack of expected recovery, or should new symptoms or problems arise.    Followup: Return in about 3 months (around 6/4/2022) for Reevaluation, labs.

## 2022-03-15 ENCOUNTER — OFFICE VISIT (OUTPATIENT)
Dept: MEDICAL GROUP | Age: 72
End: 2022-03-15
Payer: MEDICARE

## 2022-03-15 ENCOUNTER — TELEPHONE (OUTPATIENT)
Dept: MEDICAL GROUP | Age: 72
End: 2022-03-15

## 2022-03-15 VITALS
TEMPERATURE: 96.7 F | OXYGEN SATURATION: 96 % | WEIGHT: 204 LBS | HEIGHT: 62 IN | BODY MASS INDEX: 37.54 KG/M2 | SYSTOLIC BLOOD PRESSURE: 90 MMHG | HEART RATE: 66 BPM | DIASTOLIC BLOOD PRESSURE: 50 MMHG

## 2022-03-15 DIAGNOSIS — M70.62 TROCHANTERIC BURSITIS OF LEFT HIP: ICD-10-CM

## 2022-03-15 DIAGNOSIS — M79.651 ACUTE PAIN OF RIGHT THIGH: ICD-10-CM

## 2022-03-15 PROBLEM — M79.671 RIGHT FOOT PAIN: Status: RESOLVED | Noted: 2018-06-04 | Resolved: 2022-03-15

## 2022-03-15 PROBLEM — M79.672 LEFT FOOT PAIN: Status: RESOLVED | Noted: 2020-05-29 | Resolved: 2022-03-15

## 2022-03-15 PROBLEM — M10.9 ACUTE GOUT INVOLVING TOE OF RIGHT FOOT: Status: RESOLVED | Noted: 2020-11-17 | Resolved: 2022-03-15

## 2022-03-15 PROCEDURE — 99214 OFFICE O/P EST MOD 30 MIN: CPT | Mod: 25 | Performed by: FAMILY MEDICINE

## 2022-03-15 PROCEDURE — 20610 DRAIN/INJ JOINT/BURSA W/O US: CPT | Performed by: FAMILY MEDICINE

## 2022-03-15 RX ORDER — METHYLPREDNISOLONE SODIUM SUCCINATE 40 MG/ML
40 INJECTION, POWDER, LYOPHILIZED, FOR SOLUTION INTRAMUSCULAR; INTRAVENOUS ONCE
Status: COMPLETED | OUTPATIENT
Start: 2022-03-15 | End: 2022-03-15

## 2022-03-15 RX ADMIN — METHYLPREDNISOLONE SODIUM SUCCINATE 40 MG: 40 INJECTION, POWDER, LYOPHILIZED, FOR SOLUTION INTRAMUSCULAR; INTRAVENOUS at 08:02

## 2022-03-15 ASSESSMENT — FIBROSIS 4 INDEX: FIB4 SCORE: 1.04

## 2022-03-15 NOTE — TELEPHONE ENCOUNTER
Phone Number Called: [facility]    Call outcome: spoke with rep    Message: wanted to make sure you were aware of pt allegy to Aleve and wanted to make sure it was still okay to fill diclofenac gel

## 2022-03-15 NOTE — PROGRESS NOTES
Subjective:   CC: Acute right thigh pain    HPI:     Prince Mazariegos is a 71 y.o. female, established patient of the clinic.     Patient was in her normal state of health until approximately 4 weeks ago when she developed acute right anterolateral thigh pain without history of trauma.  Pain is mostly localized to the right lateral trochanter and radiates distally to the lateral knee.  Pain is better with rest and worse with ambulation.  Patient was seen by PCP 4 weeks ago.  Ultrasound of the right leg was negative for DVT.  Patient has been taking Tylenol as needed for her symptoms.  She also has been applying alternating ice/heat with minimal relief of symptoms.  Patient has been limping because of her symptoms leading to worsening right knee and lower back pain.  Historically, patient has generalized osteoarthritis affecting multiple large joints and cervical/thoracic/lumbar spine.  She also has history of intermittent gout flare.    Negative skin rash, fever, chills, history of trauma prior to onset of symptoms.    Current medicines (including changes today)  Current Outpatient Medications   Medication Sig Dispense Refill   • diclofenac sodium (VOLTAREN) 1 % Gel Apply to 2-4 gram to affected area 4 times daily 100 g 1   • levothyroxine (SYNTHROID) 75 MCG Tab Take 1 tablet by mouth every day. 90 tablet 3   • lisinopril (PRINIVIL) 40 MG tablet Take 1 tablet by mouth every day. 90 tablet 3   • metoprolol SR (TOPROL XL) 50 MG TABLET SR 24 HR Take 1 tablet by mouth every day. 90 tablet 3   • rosuvastatin (CRESTOR) 10 MG Tab Take 1 tablet by mouth every evening. 90 tablet 3   • hydroCHLOROthiazide (HYDRODIURIL) 25 MG Tab Take 1 tablet by mouth every evening. 90 tablet 3   • omeprazole (PRILOSEC) 20 MG delayed-release capsule      • acetaminophen (TYLENOL) 500 MG Tab Take 500-1,000 mg by mouth every 6 hours as needed.     • CALCIUM PO Take 2 Tabs by mouth every evening.     • Cholecalciferol (VITAMIN D) 2000 UNIT  "Tab Take 2,000 Units by mouth every evening.     • Probiotic Product (PROBIOTIC-10 PO) Take 1 Tab by mouth every day.     • polyethylene glycol 3350 (MIRALAX) 17 GM/SCOOP Powder Take 17 g by mouth every day.     • multivitamin (THERAGRAN) Tab Take 2 Tabs by mouth every evening.     • Fiber Powder Take 1 Each by mouth every day.       No current facility-administered medications for this visit.     She  has a past medical history of Anesthesia, Arrhythmia, Arthritis, Back pain, Breath shortness, Chest pain (May 2016), Chickenpox, DDD (degenerative disc disease), cervical, Disorder of thyroid, Enlarged tonsils, GERD (gastroesophageal reflux disease), Citizen of [place] measles, Hemorrhoids, High cholesterol, Hypertension, Obesity, Pain, PONV (postoperative nausea and vomiting), Psychiatric disorder, Skin cancer, Sleep apnea, and Snoring.    I reviewed patient's problem list, allergies, medications, family hx, social hx with patient and update EPIC.        Objective:     BP (!) 90/50 (BP Location: Right arm, Patient Position: Sitting, BP Cuff Size: Adult long)   Pulse 66   Temp 35.9 °C (96.7 °F) (Temporal)   Ht 1.575 m (5' 2\")   Wt 92.5 kg (204 lb)   SpO2 96%  Body mass index is 37.31 kg/m².    Physical Exam:  Constitutional: awake, alert, in no distress.  Skin: Warm, dry, good turgor, no rashes, bruises, ulcers in visible areas.  Eye: conjunctiva clear, lids neg for edema or lesions.  Respiratory: Unlabored respiratory effort, lungs clear to auscultation, no wheezes, no rales.  Cardiovascular: Normal S1, S2, no murmur, no pedal edema.  Psych: Oriented x3, affect and mood wnl, intact judgement and insight.   MSK: Physical Exam  Musculoskeletal:      Right hip: Tenderness present. No deformity, lacerations, bony tenderness or crepitus. Normal range of motion. Normal strength.        Legs:       Comments: Severe soft tissue tenderness to palpation at the right trochanter area.  Negative skin rash, hematoma.           Assessment " and Plan:   The following treatment plan was discussed    1. Acute pain of right thigh  2. Trochanteric bursitis of left hip  History and exams are concerning for acute right trochanter bursitis.  However, referred pain from lumbar spine and right hip are possible given history of generalized osteoarthritis affecting multiple large joints and spine.  We will order x-ray of lumbar spine and right hip for assessment.  - DX-LUMBAR SPINE-2 OR 3 VIEWS; Future  - DX-HIP-COMPLETE - UNILATERAL 2+ RIGHT; Future  - diclofenac sodium (VOLTAREN) 1 % Gel; Apply to 2-4 gram to affected area 4 times daily  Dispense: 100 g; Refill: 1  -Tylenol 500 milligrams up to 4 times daily as needed for pain  -Weight loss, activity modification  -Graded return to exercise  -Steroid injection: methylPREDNISolone (SOLU-MEDROL) 40 MG injection 40 mg.  Patient endorses significant relief of symptoms after steroid injection.    Trochanteric bursitis injection:   Pre-procedure dx: right trochanteric bursitis.   Post-procedure dx: same  Verbal consent obtained. Risks, benefits, and potential complications of steroid injection discussed with patient. She consented to the procedure.    A steroid injection was performed at right trochanteric bursa using 4ml of 1% plain Lidocaine and 40 mg of Solumedrol. Patient tolerated the procedure well, no immediate complications noted. Pt endorses immediate pain relief after 5 minutes. Wound care instructions provided. S/s of infection discussed with patient. She is to contact the clinic if she has concerns.    Total time spent reviewing pt's chart, labs, notes, imaging, and counseling/prescribing medications to patient before, during, and after the visit: 30 minutes.        Pat Ruano M.D.      Followup: Return for As needed.    Please note that this dictation was created using voice recognition software. I have made every reasonable attempt to correct obvious errors, but I expect that there are errors of grammar  and possibly content that I did not discover before finalizing the note.

## 2022-03-18 ENCOUNTER — HOSPITAL ENCOUNTER (OUTPATIENT)
Dept: RADIOLOGY | Facility: MEDICAL CENTER | Age: 72
End: 2022-03-18
Attending: FAMILY MEDICINE
Payer: MEDICARE

## 2022-03-18 DIAGNOSIS — M79.651 ACUTE PAIN OF RIGHT THIGH: ICD-10-CM

## 2022-03-18 PROCEDURE — 72100 X-RAY EXAM L-S SPINE 2/3 VWS: CPT

## 2022-03-18 PROCEDURE — 73502 X-RAY EXAM HIP UNI 2-3 VIEWS: CPT | Mod: RT

## 2022-03-31 DIAGNOSIS — E03.4 HYPOTHYROIDISM DUE TO ACQUIRED ATROPHY OF THYROID: ICD-10-CM

## 2022-03-31 DIAGNOSIS — I10 ESSENTIAL HYPERTENSION: ICD-10-CM

## 2022-04-01 RX ORDER — HYDROCHLOROTHIAZIDE 25 MG/1
TABLET ORAL
Qty: 90 TABLET | Refills: 0 | Status: SHIPPED | OUTPATIENT
Start: 2022-04-01 | End: 2022-06-29

## 2022-04-01 RX ORDER — ROSUVASTATIN CALCIUM 10 MG/1
TABLET, COATED ORAL
Qty: 90 TABLET | Refills: 0 | Status: SHIPPED | OUTPATIENT
Start: 2022-04-01 | End: 2022-06-29

## 2022-04-01 RX ORDER — LEVOTHYROXINE SODIUM 0.07 MG/1
TABLET ORAL
Qty: 90 TABLET | Refills: 0 | Status: SHIPPED | OUTPATIENT
Start: 2022-04-01 | End: 2022-06-29

## 2022-04-01 RX ORDER — LISINOPRIL 40 MG/1
TABLET ORAL
Qty: 90 TABLET | Refills: 0 | Status: SHIPPED | OUTPATIENT
Start: 2022-04-01 | End: 2022-06-29

## 2022-04-01 RX ORDER — METOPROLOL SUCCINATE 50 MG/1
TABLET, EXTENDED RELEASE ORAL
Qty: 90 TABLET | Refills: 0 | Status: SHIPPED | OUTPATIENT
Start: 2022-04-01 | End: 2022-06-29

## 2022-05-23 ENCOUNTER — OFFICE VISIT (OUTPATIENT)
Dept: SLEEP MEDICINE | Facility: MEDICAL CENTER | Age: 72
End: 2022-05-23
Payer: MEDICARE

## 2022-05-23 VITALS
WEIGHT: 210 LBS | BODY MASS INDEX: 38.64 KG/M2 | RESPIRATION RATE: 16 BRPM | DIASTOLIC BLOOD PRESSURE: 64 MMHG | HEART RATE: 80 BPM | OXYGEN SATURATION: 92 % | HEIGHT: 62 IN | SYSTOLIC BLOOD PRESSURE: 126 MMHG

## 2022-05-23 DIAGNOSIS — G47.33 OSA (OBSTRUCTIVE SLEEP APNEA): Chronic | ICD-10-CM

## 2022-05-23 DIAGNOSIS — Z78.9 NONSMOKER: ICD-10-CM

## 2022-05-23 DIAGNOSIS — I10 PRIMARY HYPERTENSION: ICD-10-CM

## 2022-05-23 PROCEDURE — 99213 OFFICE O/P EST LOW 20 MIN: CPT | Performed by: NURSE PRACTITIONER

## 2022-05-23 ASSESSMENT — FIBROSIS 4 INDEX: FIB4 SCORE: 1.04

## 2022-05-23 NOTE — PROGRESS NOTES
Chief Complaint   Patient presents with   • Apnea     last seen 3/10/2021        HPI:  Prince Mazariegos is a 71 y.o. year old female here today for follow-up on GENIA.  Last OV 3/10/21     Currently using CPAP @ 10cm H20 nightly; RESMED; device obtained 12/2016.  Compliance report 3/15/2022 through 4/13/2022 indicates 100% compliance, average nightly use 9 hours 52 minutes, minimal mask leak with recent increase in leak and overall AHI 1.2/h.  Reviewed with patient.  Patient feels she continues to sleep on therapy.  She does not feel sleepy and does not nap.  No morning headaches.  She tolerates mask and pressure well.  She feels her sleep is very well controlled.  She goes to bed between 9 and 10 PM and fall asleep quickly.  She usually wakes 1 time to use the restroom and resume sleep.  Her main complaint today is ongoing chronic back pain is now radiating into her right hip which she is under evaluation for.  She will rotate during the night due to this.    Sleep hx:  HSS from June 2016 showed an AHI of 12.2 with low oxygenation of 76%.  Currently she is being treated with CPAP @ 08toN03. Current device obtained 2017.      ROS: As per HPI and otherwise negative if not stated.    Past Medical History:   Diagnosis Date   • Anesthesia     PONV   • Arrhythmia    • Arthritis     osteo neck right knee back right shoulder   • Back pain    • Breath shortness     related to pain   • Chest pain May 2016    PET scan with no infarct or ischemia, normal LVEF.   • Chickenpox    • DDD (degenerative disc disease), cervical    • Disorder of thyroid    • Enlarged tonsils     Removed with adenoids   • GERD (gastroesophageal reflux disease)    • [place] measles    • Hemorrhoids     bleeding   • High cholesterol    • Hypertension     pt states well controlled on meds   • Obesity    • Pain     right shoulder/ torn rotator cuff   • PONV (postoperative nausea and vomiting)    • Psychiatric disorder     Anxiety  not taking any  medications   • Skin cancer     skin   • Sleep apnea     uses cpap   • Snoring        Past Surgical History:   Procedure Laterality Date   • PB RECONSTR TOTAL SHOULDER IMPLANT Right 8/14/2019    Procedure: ARTHROPLASTY, SHOULDER, TOTAL- REVERSE;  Surgeon: Parish Garcia M.D.;  Location: SURGERY [facility];  Service: Orthopedics   • CARPAL TUNNEL ENDOSCOPIC  10/28/2011    Performed by EDGAR GARCIA at SURGERY SAME DAY [facility] ORS   • CARPAL TUNNEL ENDOSCOPIC  10/17/2011    Performed by EDGAR GARCIA at SURGERY SAME DAY [facility] ORS   • HYSTEROSCOPY WITH VIDEO DIAGNOSTIC  10/7/08    Performed by MICHELINE WEBER at SURGERY SAME DAY [facility] ORS   • DILATION AND CURETTAGE  10/7/08    Performed by MICHELINE WEBER at SURGERY SAME DAY [facility] ORS   • LUMPECTOMY Right 2007    Breast lump removed   • CARPAL TUNNEL RELEASE     • OTHER      hemorrhoid banding   • OTHER CARDIAC SURGERY      Cath - 4-5 years ago with challapalli   • TONSILLECTOMY         Family History   Problem Relation Age of Onset   • Cancer Mother         Breast cancer   • Cancer Sister         Breast cancer   • Cancer Other    • Cancer Paternal Grandfather         liver   • Cancer Father         Pancreatic cancer   • Cancer Maternal Aunt    • Heart Disease Neg Hx        Social History     Socioeconomic History   • Marital status: Single     Spouse name: Not on file   • Number of children: Not on file   • Years of education: Not on file   • Highest education level: Master's degree (e.g., MA, MS, Daniele, MEd, MSW, LEE)   Occupational History   • Not on file   Tobacco Use   • Smoking status: Never Smoker   • Smokeless tobacco: Never Used   Vaping Use   • Vaping Use: Never used   Substance and Sexual Activity   • Alcohol use: No     Alcohol/week: 0.0 oz   • Drug use: No   • Sexual activity: Not on file   Other Topics Concern   • Not on file   Social History Narrative   • Not on file     Social Determinants of Health     Financial  "Resource Strain: Not on file   Food Insecurity: Not on file   Transportation Needs: Not on file   Physical Activity: Not on file   Stress: Not on file   Social Connections: Not on file   Intimate Partner Violence: Not on file   Housing Stability: Not on file       Allergies as of 05/23/2022 - Reviewed 05/23/2022   Allergen Reaction Noted   • Aleve [gnp naproxen sodium]  10/12/2011   • Latex Rash 10/12/2011   • Naproxen sodium Hives 06/15/2008   • Other misc Swelling 10/12/2011   • Skelaxin [metaxalone] Hives 06/15/2008   • Sunscreens Hives 09/16/2015        Vitals:  /64 (BP Location: Left arm, Patient Position: Sitting, BP Cuff Size: Adult)   Pulse 80   Resp 16   Ht 1.575 m (5' 2\")   Wt 95.3 kg (210 lb)   SpO2 92%     Current medications as of today   Current Outpatient Medications   Medication Sig Dispense Refill   • lisinopril (PRINIVIL) 40 MG tablet TAKE 1 TABLET EVERY DAY 90 Tablet 0   • rosuvastatin (CRESTOR) 10 MG Tab TAKE 1 TABLET EVERY EVENING 90 Tablet 0   • metoprolol SR (TOPROL XL) 50 MG TABLET SR 24 HR TAKE 1 TABLET EVERY DAY 90 Tablet 0   • levothyroxine (SYNTHROID) 75 MCG Tab TAKE 1 TABLET EVERY DAY 90 Tablet 0   • hydroCHLOROthiazide (HYDRODIURIL) 25 MG Tab TAKE 1 TABLET EVERY EVENING 90 Tablet 0   • diclofenac sodium (VOLTAREN) 1 % Gel Apply to 2-4 gram to affected area 4 times daily 100 g 1   • omeprazole (PRILOSEC) 20 MG delayed-release capsule      • acetaminophen (TYLENOL) 500 MG Tab Take 500-1,000 mg by mouth every 6 hours as needed.     • CALCIUM PO Take 2 Tabs by mouth every evening.     • Cholecalciferol (VITAMIN D) 2000 UNIT Tab Take 2,000 Units by mouth every evening.     • Probiotic Product (PROBIOTIC-10 PO) Take 1 Tab by mouth every day.     • polyethylene glycol 3350 (MIRALAX) 17 GM/SCOOP Powder Take 17 g by mouth every day.     • multivitamin (THERAGRAN) Tab Take 2 Tabs by mouth every evening.     • Fiber Powder Take 1 Each by mouth every day.       No current " facility-administered medications for this visit.         Physical Exam:   Gen:           Alert and oriented, No apparent distress. Mood and affect appropriate, normal interaction with examiner.  Eyes:          PERRL, EOM intact, sclere white, conjunctive moist.  Ears:          Not examined.   Hearing:     Grossly intact.  Nose:          Normal, no lesions or deformities.  Dentition:    mask  Oropharynx:   mask  Mallampati Classification: mask  Neck:        Supple, trachea midline, no masses.  Respiratory Effort: No intercostal retractions or use of accessory muscles.   Lung Auscultation:      Clear to auscultation bilaterally; no rales, rhonchi or wheezing.  CV:            Regular rate and rhythm. No murmurs, rubs or gallops.  Abd:           Not examined.   Lymphadenopathy: Not examined.  Gait and Station: Normal.  Digits and Nails: No clubbing, cyanosis, petechiae, or nodes.   Cranial Nerves: II-XII grossly intact.  Skin:        No rashes, lesions or ulcers noted.               Ext:           No cyanosis or edema.      Assessment:  1. GENIA (obstructive sleep apnea)  DME MASK AND SUPPLIES   2. Primary hypertension     3. BMI 38.0-38.9,adult  Patient identified as having weight management issue.  Appropriate orders and counseling given.   4. Nonsmoker         Immunizations:    Flu:10/2/21  Pneumovax 23:2018  Prevnar 13:2019  COVID-19: 11/9/21, 3/27/21, 2/27/21    Plan:  1.  GENIA is well controlled.  Continue CPAP nightly.  DME mask/supplies  2.  Follow primary care for ongoing management hypertension  3.  Encourage weight loss through dietary changes and exercise  4.  Follow-up in 1 year with compliance report, sooner if needed.    Please note that this dictation was created using voice recognition software. I have made every reasonable attempt to correct obvious errors, but it is possible there are errors of grammar and possibly content that I did not discover before finalizing the note.

## 2022-06-27 DIAGNOSIS — I10 ESSENTIAL HYPERTENSION: ICD-10-CM

## 2022-06-27 DIAGNOSIS — E03.4 HYPOTHYROIDISM DUE TO ACQUIRED ATROPHY OF THYROID: ICD-10-CM

## 2022-06-28 NOTE — TELEPHONE ENCOUNTER
Received request via: Pharmacy    Was the patient seen in the last year in this department? Yes 3/15/22    Does the patient have an active prescription (recently filled or refills available) for medication(s) requested? No

## 2022-06-29 RX ORDER — LISINOPRIL 40 MG/1
TABLET ORAL
Qty: 90 TABLET | Refills: 0 | Status: SHIPPED | OUTPATIENT
Start: 2022-06-29 | End: 2022-11-18

## 2022-06-29 RX ORDER — HYDROCHLOROTHIAZIDE 25 MG/1
TABLET ORAL
Qty: 90 TABLET | Refills: 0 | Status: SHIPPED | OUTPATIENT
Start: 2022-06-29 | End: 2022-11-18

## 2022-06-29 RX ORDER — ROSUVASTATIN CALCIUM 10 MG/1
TABLET, COATED ORAL
Qty: 90 TABLET | Refills: 0 | Status: SHIPPED | OUTPATIENT
Start: 2022-06-29 | End: 2022-11-18

## 2022-06-29 RX ORDER — LEVOTHYROXINE SODIUM 0.07 MG/1
TABLET ORAL
Qty: 90 TABLET | Refills: 0 | Status: SHIPPED | OUTPATIENT
Start: 2022-06-29 | End: 2022-11-18

## 2022-06-29 RX ORDER — METOPROLOL SUCCINATE 50 MG/1
TABLET, EXTENDED RELEASE ORAL
Qty: 90 TABLET | Refills: 0 | Status: SHIPPED | OUTPATIENT
Start: 2022-06-29 | End: 2022-11-18

## 2022-07-14 ENCOUNTER — HOSPITAL ENCOUNTER (EMERGENCY)
Facility: MEDICAL CENTER | Age: 72
End: 2022-07-14
Attending: EMERGENCY MEDICINE
Payer: MEDICARE

## 2022-07-14 ENCOUNTER — TELEPHONE (OUTPATIENT)
Dept: MEDICAL GROUP | Age: 72
End: 2022-07-14
Payer: MEDICARE

## 2022-07-14 VITALS
DIASTOLIC BLOOD PRESSURE: 57 MMHG | TEMPERATURE: 98 F | WEIGHT: 210.32 LBS | HEIGHT: 62 IN | HEART RATE: 65 BPM | OXYGEN SATURATION: 95 % | SYSTOLIC BLOOD PRESSURE: 112 MMHG | BODY MASS INDEX: 38.7 KG/M2 | RESPIRATION RATE: 18 BRPM

## 2022-07-14 DIAGNOSIS — M54.31 RIGHT SIDED SCIATICA: ICD-10-CM

## 2022-07-14 DIAGNOSIS — Z87.39 HISTORY OF DEGENERATIVE DISC DISEASE: ICD-10-CM

## 2022-07-14 DIAGNOSIS — M79.671 FOOT PAIN, RIGHT: ICD-10-CM

## 2022-07-14 PROCEDURE — 99284 EMERGENCY DEPT VISIT MOD MDM: CPT

## 2022-07-14 PROCEDURE — A9270 NON-COVERED ITEM OR SERVICE: HCPCS | Performed by: EMERGENCY MEDICINE

## 2022-07-14 PROCEDURE — 700111 HCHG RX REV CODE 636 W/ 250 OVERRIDE (IP): Performed by: EMERGENCY MEDICINE

## 2022-07-14 PROCEDURE — 96375 TX/PRO/DX INJ NEW DRUG ADDON: CPT

## 2022-07-14 PROCEDURE — 700102 HCHG RX REV CODE 250 W/ 637 OVERRIDE(OP): Performed by: EMERGENCY MEDICINE

## 2022-07-14 PROCEDURE — 96374 THER/PROPH/DIAG INJ IV PUSH: CPT

## 2022-07-14 RX ORDER — DEXAMETHASONE SODIUM PHOSPHATE 4 MG/ML
10 INJECTION, SOLUTION INTRA-ARTICULAR; INTRALESIONAL; INTRAMUSCULAR; INTRAVENOUS; SOFT TISSUE ONCE
Status: COMPLETED | OUTPATIENT
Start: 2022-07-14 | End: 2022-07-14

## 2022-07-14 RX ORDER — ONDANSETRON 2 MG/ML
4 INJECTION INTRAMUSCULAR; INTRAVENOUS ONCE
Status: COMPLETED | OUTPATIENT
Start: 2022-07-14 | End: 2022-07-14

## 2022-07-14 RX ORDER — OXYCODONE HYDROCHLORIDE AND ACETAMINOPHEN 5; 325 MG/1; MG/1
1 TABLET ORAL ONCE
Status: COMPLETED | OUTPATIENT
Start: 2022-07-14 | End: 2022-07-14

## 2022-07-14 RX ORDER — OXYCODONE HYDROCHLORIDE AND ACETAMINOPHEN 5; 325 MG/1; MG/1
1 TABLET ORAL EVERY 6 HOURS PRN
Qty: 20 TABLET | Refills: 0 | Status: SHIPPED | OUTPATIENT
Start: 2022-07-14 | End: 2022-07-19

## 2022-07-14 RX ORDER — MORPHINE SULFATE 4 MG/ML
4 INJECTION INTRAVENOUS ONCE
Status: COMPLETED | OUTPATIENT
Start: 2022-07-14 | End: 2022-07-14

## 2022-07-14 RX ORDER — MORPHINE SULFATE 4 MG/ML
4 INJECTION INTRAVENOUS ONCE
Status: DISCONTINUED | OUTPATIENT
Start: 2022-07-14 | End: 2022-07-14

## 2022-07-14 RX ORDER — METHYLPREDNISOLONE 4 MG/1
TABLET ORAL
Qty: 1 EACH | Refills: 0 | Status: SHIPPED | OUTPATIENT
Start: 2022-07-14 | End: 2022-07-19

## 2022-07-14 RX ORDER — METHYLPREDNISOLONE 4 MG/1
TABLET ORAL
Qty: 1 EACH | Refills: 0 | Status: SHIPPED | OUTPATIENT
Start: 2022-07-14 | End: 2022-07-14 | Stop reason: SDUPTHER

## 2022-07-14 RX ADMIN — DEXAMETHASONE SODIUM PHOSPHATE 10 MG: 4 INJECTION, SOLUTION INTRA-ARTICULAR; INTRALESIONAL; INTRAMUSCULAR; INTRAVENOUS; SOFT TISSUE at 02:07

## 2022-07-14 RX ADMIN — OXYCODONE AND ACETAMINOPHEN 1 TABLET: 5; 325 TABLET ORAL at 03:00

## 2022-07-14 RX ADMIN — ONDANSETRON 4 MG: 2 INJECTION INTRAMUSCULAR; INTRAVENOUS at 02:00

## 2022-07-14 RX ADMIN — MORPHINE SULFATE 4 MG: 4 INJECTION INTRAVENOUS at 02:17

## 2022-07-14 ASSESSMENT — FIBROSIS 4 INDEX: FIB4 SCORE: 1.06

## 2022-07-14 NOTE — ED PROVIDER NOTES
ED Provider Note     Scribed for Kim Fontaine D.O. by Alex Nails. 7/14/2022, 1:45 AM.     Primary care provider: Akshat Reaves M.D.  Means of arrival: Walk-in         History obtained from: Patient  History limited by: None    CHIEF COMPLAINT  Chief Complaint   Patient presents with   • Foot Pain     Right foot pain no trauma     HPI  Prince Mazariegos is a 72 y.o. female who presents to the emergency Department for acute, mild right  foot pain onset a few days ago.  She describes it as burning from the lateral aspect of her thigh down her calf and into the foot.  Per patient, she went to [facility] and had a right foot x-ray done and says the providers believed it was related to gout. She was placed on Medrol Dosepak and improved remarkably after that.  Unfortunately her pain has increased again.  Lateral. Patient is concerned she may have a blood clot as her pain radiates from her leg down to her foot. Denies history of sciatica. No alleviating or exacerbating factors reported.  She has a known history of cervical degenerative disc disease and has been evaluated by Dr. Larsen with neurosurgery in the past.  Oral mucosa moist.    REVIEW OF SYSTEMS  Pertinent positives include right foot pain and right leg pain. Pertinent negatives include no fever.   See HPI for further details.    PAST MEDICAL HISTORY  Past Medical History:   Diagnosis Date   • Anesthesia     PONV   • Arrhythmia    • Arthritis     osteo neck right knee back right shoulder   • Back pain    • Breath shortness     related to pain   • Chest pain May 2016    PET scan with no infarct or ischemia, normal LVEF.   • Chickenpox    • DDD (degenerative disc disease), cervical    • Disorder of thyroid    • Enlarged tonsils     Removed with adenoids   • GERD (gastroesophageal reflux disease)    • Lao measles    • Hemorrhoids     bleeding   • High cholesterol    • Hypertension     pt states well controlled on meds   • Obesity    • Pain     right  shoulder/ torn rotator cuff   • PONV (postoperative nausea and vomiting)    • Psychiatric disorder     Anxiety  not taking any medications   • Skin cancer     skin   • Sleep apnea     uses cpap   • Snoring        FAMILY HISTORY  Family History   Problem Relation Age of Onset   • Cancer Mother         Breast cancer   • Cancer Sister         Breast cancer   • Cancer Other    • Cancer Paternal Grandfather         liver   • Cancer Father         Pancreatic cancer   • Cancer Maternal Aunt    • Heart Disease Neg Hx        SOCIAL HISTORY  Social History     Tobacco Use   • Smoking status: Never Smoker   • Smokeless tobacco: Never Used   Vaping Use   • Vaping Use: Never used   Substance Use Topics   • Alcohol use: No     Alcohol/week: 0.0 oz   • Drug use: No      Social History     Substance and Sexual Activity   Drug Use No       SURGICAL HISTORY  Past Surgical History:   Procedure Laterality Date   • PB RECONSTR TOTAL SHOULDER IMPLANT Right 8/14/2019    Procedure: ARTHROPLASTY, SHOULDER, TOTAL- REVERSE;  Surgeon: Parish Garcia M.D.;  Location: SURGERY [facility];  Service: Orthopedics   • CARPAL TUNNEL ENDOSCOPIC  10/28/2011    Performed by EDGAR GARCIA at SURGERY SAME DAY [facility]   • CARPAL TUNNEL ENDOSCOPIC  10/17/2011    Performed by EDGAR GARCIA at SURGERY SAME DAY [facility]   • HYSTEROSCOPY WITH VIDEO DIAGNOSTIC  10/7/08    Performed by MICHELINE WEBER at SURGERY SAME DAY [facility]   • DILATION AND CURETTAGE  10/7/08    Performed by MICHELINE WEBER at SURGERY SAME DAY [facility]   • LUMPECTOMY Right 2007    Breast lump removed   • CARPAL TUNNEL RELEASE     • OTHER      hemorrhoid banding   • OTHER CARDIAC SURGERY      Cath - 4-5 years ago with ba   • TONSILLECTOMY         CURRENT MEDICATIONS    Current Facility-Administered Medications:   •  dexamethasone (DECADRON) injection 10 mg, 10 mg, Intravenous, Once, Kim Fontaine D.O.  •  morphine 4 MG/ML injection 4  mg, 4 mg, Intravenous, Once, Kim Fontaine D.O.  •  ondansetron (ZOFRAN) syringe/vial injection 4 mg, 4 mg, Intravenous, Once, Kim Fontaine D.O.    Current Outpatient Medications:   •  hydroCHLOROthiazide (HYDRODIURIL) 25 MG Tab, TAKE 1 TABLET EVERY EVENING, Disp: 90 Tablet, Rfl: 0  •  levothyroxine (SYNTHROID) 75 MCG Tab, TAKE 1 TABLET EVERY DAY, Disp: 90 Tablet, Rfl: 0  •  metoprolol SR (TOPROL XL) 50 MG TABLET SR 24 HR, TAKE 1 TABLET EVERY DAY, Disp: 90 Tablet, Rfl: 0  •  rosuvastatin (CRESTOR) 10 MG Tab, TAKE 1 TABLET EVERY EVENING, Disp: 90 Tablet, Rfl: 0  •  lisinopril (PRINIVIL) 40 MG tablet, TAKE 1 TABLET EVERY DAY, Disp: 90 Tablet, Rfl: 0  •  methylPREDNISolone (MEDROL DOSEPAK) 4 MG Tablet Therapy Pack, Follow schedule on package instructions., Disp: 21 Tablet, Rfl: 0  •  diclofenac sodium (VOLTAREN) 1 % Gel, Apply to 2-4 gram to affected area 4 times daily, Disp: 100 g, Rfl: 1  •  omeprazole (PRILOSEC) 20 MG delayed-release capsule, , Disp: , Rfl:   •  acetaminophen (TYLENOL) 500 MG Tab, Take 500-1,000 mg by mouth every 6 hours as needed., Disp: , Rfl:   •  CALCIUM PO, Take 2 Tabs by mouth every evening., Disp: , Rfl:   •  Cholecalciferol (VITAMIN D) 2000 UNIT Tab, Take 2,000 Units by mouth every evening., Disp: , Rfl:   •  Probiotic Product (PROBIOTIC-10 PO), Take 1 Tab by mouth every day., Disp: , Rfl:   •  polyethylene glycol 3350 (MIRALAX) 17 GM/SCOOP Powder, Take 17 g by mouth every day., Disp: , Rfl:   •  multivitamin (THERAGRAN) Tab, Take 2 Tabs by mouth every evening., Disp: , Rfl:   •  Fiber Powder, Take 1 Each by mouth every day., Disp: , Rfl:     ALLERGIES  Allergies   Allergen Reactions   • Other Drug Hives     Vicodin    • Aleve [Gnp Naproxen Sodium]      hives   • Latex Rash   • Naproxen Sodium Hives   • Other Misc Swelling     Some soap & toothpaste puffs, colored laundry soap dryer sheets   • Skelaxin [Metaxalone] Hives   • Sunscreens Hives       PHYSICAL EXAM  VITAL SIGNS: /72    "Pulse 69   Temp 36.2 °C (97.2 °F) (Temporal)   Resp 16   Ht 1.575 m (5' 2\")   Wt 95.4 kg (210 lb 5.1 oz)   SpO2 94%   BMI 38.47 kg/m²     Constitutional: Patient is well developed, well nourished. Non-toxic appearing. Morbidly obese. Moderate distress.  HENT: Normocephalic, atraumatic.  Cardiovascular: Normal heart rate and Regular rhythm. No murmur  Thorax & Lungs: Clear and equal breath sounds with good excursion. No respiratory distress, no rhonchi, wheezing  Abdomen: Obese, Bowel sounds normal in all four quadrants. Soft,nontender  Skin: Warm, Dry, No contusions, abrasions, or rash.  Extremities: Peripheral pulses 4/4 No edema, tenderness in the right SI joint extending to right gluteal region down the lateral leg and foot.   Neurologic: Alert & oriented x 3, Normal motor function, Normal sensory function.  Psychiatric: Affect normal, Judgment normal, Mood normal.     COURSE & MEDICAL DECISION MAKING  Pertinent Labs & Imaging studies reviewed. (See chart for details)    1:45 AM - Patient seen and evaluated at bedside.  Discussed plan of care, including pain medication.  Patient agrees with plan of care. Patient will be treated with Decadron 10 mg injection, morphine 4 mg injection, and Zofran 4 mg injection for her symptoms. Differential diagnoses include, but are not limited to, Degenerative Disc Disease vs. Sciatica    2:35 AM - Patient was reevaluated at bedside and is still experiencing pain in her right foot.    2:39 AM - Patient will be treated with Percocet 5-325 mg PO.  Prescriptions for Percocet and Medrol Dosepak were sent to her local pharmacy.  She was reviewed on the .  She is instructed to apply moist heat to the affected area, call Dr. Larsen's office or her primary care doctor today for further evaluation and treatment.  She is discharged in stable and improved condition.      The patient will return for new or worsening symptoms and is stable at the time of discharge.    In prescribing " controlled substances to this patient, I certify that I have obtained and reviewed the medical history of Prince Mazariegos. I have also made a good niranjan effort to obtain applicable records from other providers who have treated the patient and records did not demonstrate any increased risk of substance abuse that would prevent me from prescribing controlled substances.     I have conducted a physical exam and documented it. I have reviewed Ms. Mazariegos’s prescription history as maintained by the [place] Prescription Monitoring Program.     I have assessed the patient’s risk for abuse, dependency, and addiction using the validated Opioid Risk Tool available at https://www.mdcalc.com/wvsfwa-qdow-upid-ort-narcotic-abuse.     Given the above, I believe the benefits of controlled substance therapy outweigh the risks. The reasons for prescribing controlled substances include non-narcotic, oral analgesic alternatives have been inadequate for pain control. Accordingly, I have discussed the risk and benefits, treatment plan, and alternative therapies with the patient.     DISPOSITION:  Patient will be discharged home in stable condition.    FOLLOW UP:  Juan Antonio Larsen M.D.  [address] [facility] [id number] [phone]    Schedule an appointment as soon as possible for a visit today      OUTPATIENT MEDICATIONS:  New Prescriptions    METHYLPREDNISOLONE (MEDROL DOSEPAK) 4 MG TABLET THERAPY PACK    Take as directed with food    OXYCODONE-ACETAMINOPHEN (PERCOCET) 5-325 MG TAB    Take 1 Tablet by mouth every 6 hours as needed for Severe Pain (pain) for up to 5 days. Take with food and stool softeners.     FINAL IMPRESSION  1. Foot pain, right    2. Right sided sciatica    3. History of degenerative disc disease      Alex FRANCOIS (Jessica), am scribing for, and in the presence of, Kim Fontaine D.O..    Electronically signed by: Alex Nails (Jessica), 7/14/2022    Kim FRANCOIS D.O. personally performed the services described  in this documentation, as scribed by Alex Nails in my presence, and it is both accurate and complete.    The note accurately reflects work and decisions made by me.  Kim Fontaine D.O.  7/14/2022  5:52 AM

## 2022-07-14 NOTE — TELEPHONE ENCOUNTER
VOICEMAIL  1. Caller Name: Prince Mazariegos                        Call Back Number: [phone] (home)       2. Message: Patient called. Was in hospital yesterday for leg. ER doctor recommended order for MRI. Patient would also like new referral for Dr. Larsen for her leg. Please advise.    3. Patient approves office to leave a detailed voicemail/MyChart message: yes

## 2022-07-14 NOTE — ED NOTES
Patient ambulates too ER, complaining of lateral thigh pain that has moved over the past few days into her calf and ankle.

## 2022-07-14 NOTE — ED NOTES
PT cleared for DC home pt in stable condition no further questions or concerns pt advised to f/u with pcp education for New RX provided pt verbalized understanding brother to drive pt home. Pt ambulated out of ED w/steady gait advised to return to closest ED for any worsening symptoms

## 2022-07-14 NOTE — DISCHARGE INSTRUCTIONS
Avoid sitting for extended periods of time on your buttocks, get a small pillow to avoid putting pressure on your right sciatic nerve.  Try hot packs or warm moist compresses to the affected area  Take the medications I am prescribing you as needed for for pain with food.  If the Percocet is too strong you can cut them in half and take them every 6-8 hours.  Call today to schedule an appointment with Dr. Larsen, if you cannot get in call your primary care doctor for referral to see Dr. Larsen as you will need to have an MRI of your lumbar spine.  Return of bowel or bladder incontinence or leg weakness.

## 2022-07-14 NOTE — ED TRIAGE NOTES
Right foot pain starting from lower back down leg pt denies trauma reports the pain in her foot is unbearable

## 2022-07-19 ENCOUNTER — OFFICE VISIT (OUTPATIENT)
Dept: MEDICAL GROUP | Age: 72
End: 2022-07-19
Payer: MEDICARE

## 2022-07-19 VITALS
HEIGHT: 62 IN | DIASTOLIC BLOOD PRESSURE: 70 MMHG | BODY MASS INDEX: 36.29 KG/M2 | SYSTOLIC BLOOD PRESSURE: 128 MMHG | RESPIRATION RATE: 16 BRPM | OXYGEN SATURATION: 97 % | WEIGHT: 197.2 LBS | HEART RATE: 72 BPM | TEMPERATURE: 98.2 F

## 2022-07-19 DIAGNOSIS — Z09 HOSPITAL DISCHARGE FOLLOW-UP: ICD-10-CM

## 2022-07-19 DIAGNOSIS — M79.671 RIGHT FOOT PAIN: ICD-10-CM

## 2022-07-19 PROCEDURE — 99213 OFFICE O/P EST LOW 20 MIN: CPT | Performed by: FAMILY MEDICINE

## 2022-07-19 ASSESSMENT — FIBROSIS 4 INDEX: FIB4 SCORE: 1.06

## 2022-07-19 NOTE — PROGRESS NOTES
This medical record contains text that has been entered with the assistance of computer voice recognition and dictation software.  Therefore, it may contain unintended errors in text, spelling, punctuation, or grammar      Chief Complaint   Patient presents with   • Hospital Follow-up     7/14/22 ED Severe pain in R foot, steroids helped, nothing found, possible siatica, radiates up leg to back   • Lab Results     Imaging Labs 7/14/22         Prince Mazariegos is a 72 y.o. female here evaluation and management of: Spittle follow-up      HPI:     1. Hospital discharge follow-up      2. Right foot pain    Prince is a very pleasant 72-year-old female who presents to the emergency room on July 14 with a chief complaint of mid right foot pain which had begun few days prior to that.  She also describes it as a burning on the lateral aspect of her thigh going down her calf into the foot.  She did have a plain foot x-ray done at [facility] express the providers there believed it was due to gout.  She was given a Medrol Dosepak which significantly improved the pain however as that wore off her pain began to return.  She was also concerned about having a blood clot.        DX-FOOT-COMPLETE 3+ RIGHT completed on June 27, 2022  Order: [id number]   Status: Edited Result - FINAL     Visible to patient: Yes (seen)     Next appt: 08/09/2022 at 09:00 AM in Orthopedics (Tl Hill M.D.)     Dx: Right foot pain     0 Result Notes    Details    Reading Physician Reading Date Result Priority   MARIANO SmartPMiguelN.  [phone] 6/27/2022 Routine     Narrative  Multiple views of the right foot were reviewed by myself.  There is no   displaced fracture or dislocation.             Exam Ended: 06/27/22  1:33 PM Last Resulted: 06/27/22  2:22 PM        Order Details      View Encounter      Lab and Collection Details      Routing      Result History - Result Edited             Result Care Coordination          Current  medicines (including changes today)  Current Outpatient Medications   Medication Sig Dispense Refill   • oxyCODONE-acetaminophen (PERCOCET) 5-325 MG Tab Take 1 Tablet by mouth every 6 hours as needed for Severe Pain (pain) for up to 5 days. Take with food and stool softeners. 20 Tablet 0   • hydroCHLOROthiazide (HYDRODIURIL) 25 MG Tab TAKE 1 TABLET EVERY EVENING 90 Tablet 0   • levothyroxine (SYNTHROID) 75 MCG Tab TAKE 1 TABLET EVERY DAY 90 Tablet 0   • metoprolol SR (TOPROL XL) 50 MG TABLET SR 24 HR TAKE 1 TABLET EVERY DAY 90 Tablet 0   • rosuvastatin (CRESTOR) 10 MG Tab TAKE 1 TABLET EVERY EVENING 90 Tablet 0   • lisinopril (PRINIVIL) 40 MG tablet TAKE 1 TABLET EVERY DAY 90 Tablet 0   • acetaminophen (TYLENOL) 500 MG Tab Take 500-1,000 mg by mouth every 6 hours as needed.     • CALCIUM PO Take 2 Tabs by mouth every evening.     • Cholecalciferol (VITAMIN D) 2000 UNIT Tab Take 2,000 Units by mouth every evening.     • Probiotic Product (PROBIOTIC-10 PO) Take 1 Tab by mouth every day.     • polyethylene glycol 3350 (MIRALAX) 17 GM/SCOOP Powder Take 17 g by mouth every day.     • multivitamin (THERAGRAN) Tab Take 2 Tabs by mouth every evening.     • Fiber Powder Take 1 Each by mouth every day.       No current facility-administered medications for this visit.     She  has a past medical history of Anesthesia, Arrhythmia, Arthritis, Back pain, Breath shortness, Chest pain (May 2016), Chickenpox, DDD (degenerative disc disease), cervical, Disorder of thyroid, Enlarged tonsils, GERD (gastroesophageal reflux disease), Bengali measles, Hemorrhoids, High cholesterol, Hypertension, Obesity, Pain, PONV (postoperative nausea and vomiting), Psychiatric disorder, Skin cancer, Sleep apnea, and Snoring.  She  has a past surgical history that includes lumpectomy (Right, 2007); tonsillectomy; other cardiac surgery; other; hysteroscopy with video diagnostic (10/7/08); dilation and curettage (10/7/08); carpal tunnel endoscopic  "(10/17/2011); carpal tunnel endoscopic (10/28/2011); carpal tunnel release; and pr reconstr total shoulder implant (Right, 2019).  Social History     Tobacco Use   • Smoking status: Never Smoker   • Smokeless tobacco: Never Used   Vaping Use   • Vaping Use: Never used   Substance Use Topics   • Alcohol use: No     Alcohol/week: 0.0 oz   • Drug use: No     Social History     Social History Narrative   • Not on file     Family History   Problem Relation Age of Onset   • Cancer Mother         Breast cancer   • Cancer Sister         Breast cancer   • Cancer Other    • Cancer Paternal Grandfather         liver   • Cancer Father         Pancreatic cancer   • Cancer Maternal Aunt    • Heart Disease Neg Hx      Family Status   Relation Name Status   • Mo     • Sis x1 Alive   • OTHER cousin Alive   • PGFa old age    • Fa     • Bro x1 Alive   • MGMo old age    • MGFa old age    • PGMo old age    • MAunt  Alive   • Neg Hx  (Not Specified)         ROS    The pertinent  ROS findings can be seen in the HPI above.     All other systems reviewed and are negative     Objective:     /70 (BP Location: Right arm, Patient Position: Sitting, BP Cuff Size: Adult)   Pulse 72   Temp 36.8 °C (98.2 °F) (Temporal)   Resp 16   Ht 1.575 m (5' 2\")   Wt 89.4 kg (197 lb 3.2 oz)   SpO2 97%  Body mass index is 36.07 kg/m².      Physical Exam:    Constitutional: Alert, no distress.  Skin: No suspicious lesions  Eye: Equal, round and reactive, conjunctiva clear, lids normal.  ENMT: Lips without lesions, good dentition, oropharynx clear.  Neck: Trachea midline, no masses, no thyromegaly. No cervical or supraclavicular lymphadenopathy.  Respiratory: Unlabored respiratory effort, lungs clear to auscultation, no wheezes, no ronchi.  Cardiovascular: Normal S1, S2, no murmur, no edema  Abdomen: Soft, non-tender, no masses, no hepatosplenomegaly.  BACK Pain exam Thoracic and Lumbar " Spine  Inspection of back and posture -revealed a normal exam  Range of motion = 180 degrees bilaterally  Palpation of the spine =NTT  no spasms noted, no lumbar spine tenderness, no saddle anasthesia, able to dorsiflex bilateral toes, 2+DTRs (patellar) bilaterally,  negative straight leg raise bilaterally both supine and seated,  Nontender bilateral erector spinae, normal gait               Assessment and Plan:   The following treatment plan was discussed      1. Hospital discharge follow-up  2. Right foot pain    This could be related to the lumbar spine.  Patient is already had a plain film  She has a visit with her neurosurgeon Dr. Juan Antonio Larsen in 2 days.        Instructed to Follow up in clinic or ER for worsening symptoms, difficulty breathing, lack of expected recovery, or should new symptoms or problems arise.    Followup: Return in about 6 months (around 1/19/2023) for Reevaluation, labs.

## 2022-08-11 ENCOUNTER — APPOINTMENT (RX ONLY)
Dept: URBAN - METROPOLITAN AREA CLINIC 20 | Facility: CLINIC | Age: 72
Setting detail: DERMATOLOGY
End: 2022-08-11

## 2022-08-11 DIAGNOSIS — L57.0 ACTINIC KERATOSIS: ICD-10-CM | Status: WORSENING

## 2022-08-11 DIAGNOSIS — L57.8 OTHER SKIN CHANGES DUE TO CHRONIC EXPOSURE TO NONIONIZING RADIATION: ICD-10-CM

## 2022-08-11 DIAGNOSIS — L82.1 OTHER SEBORRHEIC KERATOSIS: ICD-10-CM

## 2022-08-11 DIAGNOSIS — D18.0 HEMANGIOMA: ICD-10-CM

## 2022-08-11 DIAGNOSIS — D22 MELANOCYTIC NEVI: ICD-10-CM

## 2022-08-11 DIAGNOSIS — L81.4 OTHER MELANIN HYPERPIGMENTATION: ICD-10-CM

## 2022-08-11 PROBLEM — D22.62 MELANOCYTIC NEVI OF LEFT UPPER LIMB, INCLUDING SHOULDER: Status: ACTIVE | Noted: 2022-08-11

## 2022-08-11 PROBLEM — D22.5 MELANOCYTIC NEVI OF TRUNK: Status: ACTIVE | Noted: 2022-08-11

## 2022-08-11 PROBLEM — D18.01 HEMANGIOMA OF SKIN AND SUBCUTANEOUS TISSUE: Status: ACTIVE | Noted: 2022-08-11

## 2022-08-11 PROBLEM — D22.72 MELANOCYTIC NEVI OF LEFT LOWER LIMB, INCLUDING HIP: Status: ACTIVE | Noted: 2022-08-11

## 2022-08-11 PROBLEM — D22.71 MELANOCYTIC NEVI OF RIGHT LOWER LIMB, INCLUDING HIP: Status: ACTIVE | Noted: 2022-08-11

## 2022-08-11 PROBLEM — D22.61 MELANOCYTIC NEVI OF RIGHT UPPER LIMB, INCLUDING SHOULDER: Status: ACTIVE | Noted: 2022-08-11

## 2022-08-11 PROCEDURE — ? LIQUID NITROGEN

## 2022-08-11 PROCEDURE — ? COUNSELING

## 2022-08-11 PROCEDURE — 17000 DESTRUCT PREMALG LESION: CPT

## 2022-08-11 PROCEDURE — 17003 DESTRUCT PREMALG LES 2-14: CPT

## 2022-08-11 PROCEDURE — 99213 OFFICE O/P EST LOW 20 MIN: CPT | Mod: 25

## 2022-08-11 ASSESSMENT — LOCATION DETAILED DESCRIPTION DERM
LOCATION DETAILED: RIGHT RADIAL DORSAL HAND
LOCATION DETAILED: RIGHT PROXIMAL CALF
LOCATION DETAILED: RIGHT CENTRAL TEMPLE
LOCATION DETAILED: LEFT DISTAL POSTERIOR UPPER ARM
LOCATION DETAILED: RIGHT SUPERIOR UPPER BACK
LOCATION DETAILED: LEFT SUPERIOR FOREHEAD
LOCATION DETAILED: LEFT RADIAL DORSAL HAND
LOCATION DETAILED: RIGHT DISTAL POSTERIOR UPPER ARM
LOCATION DETAILED: LEFT INFERIOR POSTAURICULAR SKIN
LOCATION DETAILED: RIGHT INFERIOR MEDIAL UPPER BACK
LOCATION DETAILED: RIGHT INFERIOR CENTRAL MALAR CHEEK
LOCATION DETAILED: LEFT PROXIMAL CALF
LOCATION DETAILED: LEFT SUPERIOR UPPER BACK
LOCATION DETAILED: RIGHT INFERIOR ANTERIOR NECK

## 2022-08-11 ASSESSMENT — LOCATION ZONE DERM
LOCATION ZONE: FACE
LOCATION ZONE: TRUNK
LOCATION ZONE: HAND
LOCATION ZONE: NECK
LOCATION ZONE: SCALP
LOCATION ZONE: LEG
LOCATION ZONE: ARM

## 2022-08-11 ASSESSMENT — LOCATION SIMPLE DESCRIPTION DERM
LOCATION SIMPLE: RIGHT CALF
LOCATION SIMPLE: RIGHT CHEEK
LOCATION SIMPLE: RIGHT TEMPLE
LOCATION SIMPLE: RIGHT HAND
LOCATION SIMPLE: LEFT POSTERIOR UPPER ARM
LOCATION SIMPLE: LEFT UPPER BACK
LOCATION SIMPLE: RIGHT ANTERIOR NECK
LOCATION SIMPLE: RIGHT UPPER BACK
LOCATION SIMPLE: LEFT HAND
LOCATION SIMPLE: LEFT CALF
LOCATION SIMPLE: LEFT FOREHEAD
LOCATION SIMPLE: RIGHT POSTERIOR UPPER ARM
LOCATION SIMPLE: SCALP

## 2022-10-28 ENCOUNTER — TELEPHONE (OUTPATIENT)
Dept: HEALTH INFORMATION MANAGEMENT | Facility: OTHER | Age: 72
End: 2022-10-28
Payer: MEDICARE

## 2022-11-09 ENCOUNTER — HOSPITAL ENCOUNTER (OUTPATIENT)
Dept: RADIOLOGY | Facility: MEDICAL CENTER | Age: 72
End: 2022-11-09
Attending: FAMILY MEDICINE
Payer: MEDICARE

## 2022-11-09 DIAGNOSIS — Z12.31 VISIT FOR SCREENING MAMMOGRAM: ICD-10-CM

## 2022-11-09 PROCEDURE — 77063 BREAST TOMOSYNTHESIS BI: CPT

## 2022-11-11 ENCOUNTER — PATIENT MESSAGE (OUTPATIENT)
Dept: HEALTH INFORMATION MANAGEMENT | Facility: OTHER | Age: 72
End: 2022-11-11

## 2022-11-17 DIAGNOSIS — E03.4 HYPOTHYROIDISM DUE TO ACQUIRED ATROPHY OF THYROID: ICD-10-CM

## 2022-11-17 DIAGNOSIS — I10 ESSENTIAL HYPERTENSION: ICD-10-CM

## 2022-11-18 RX ORDER — HYDROCHLOROTHIAZIDE 25 MG/1
TABLET ORAL
Qty: 90 TABLET | Refills: 0 | Status: SHIPPED | OUTPATIENT
Start: 2022-11-18 | End: 2023-04-04 | Stop reason: SDUPTHER

## 2022-11-18 RX ORDER — METOPROLOL SUCCINATE 50 MG/1
TABLET, EXTENDED RELEASE ORAL
Qty: 90 TABLET | Refills: 0 | Status: SHIPPED | OUTPATIENT
Start: 2022-11-18 | End: 2023-04-04 | Stop reason: SDUPTHER

## 2022-11-18 RX ORDER — LISINOPRIL 40 MG/1
TABLET ORAL
Qty: 90 TABLET | Refills: 0 | Status: SHIPPED | OUTPATIENT
Start: 2022-11-18 | End: 2023-04-04 | Stop reason: SDUPTHER

## 2022-11-18 RX ORDER — ROSUVASTATIN CALCIUM 10 MG/1
TABLET, COATED ORAL
Qty: 90 TABLET | Refills: 0 | Status: SHIPPED | OUTPATIENT
Start: 2022-11-18 | End: 2023-04-04 | Stop reason: SDUPTHER

## 2022-11-18 RX ORDER — LEVOTHYROXINE SODIUM 0.07 MG/1
TABLET ORAL
Qty: 90 TABLET | Refills: 0 | Status: SHIPPED | OUTPATIENT
Start: 2022-11-18 | End: 2023-04-04 | Stop reason: SDUPTHER

## 2022-11-30 ENCOUNTER — GYNECOLOGY VISIT (OUTPATIENT)
Dept: OBGYN | Facility: CLINIC | Age: 72
End: 2022-11-30
Payer: MEDICARE

## 2022-11-30 VITALS — DIASTOLIC BLOOD PRESSURE: 66 MMHG | BODY MASS INDEX: 37.86 KG/M2 | SYSTOLIC BLOOD PRESSURE: 125 MMHG | WEIGHT: 207 LBS

## 2022-11-30 DIAGNOSIS — Z01.419 WELL WOMAN EXAM WITH ROUTINE GYNECOLOGICAL EXAM: ICD-10-CM

## 2022-11-30 PROCEDURE — G0101 CA SCREEN;PELVIC/BREAST EXAM: HCPCS | Performed by: OBSTETRICS & GYNECOLOGY

## 2022-11-30 NOTE — PROGRESS NOTES
Gynecology Annual    Prince Mazariegos    72 y.o.    Chief complaint    Chief Complaint   Patient presents with    Gynecologic Exam     New Patient       HPI    Patient is a 73 yo G0 who presents for gyn annual exam.   She has no concerns today.   Denies pelvic pain, vaginal bleeding, abnormal discharge or changes in bowel/bladder.   Does have occasional constipation, controlled with OTC stool softeners.   Also does have hemorrhoids occasionally.   Reports extensive family history of breast cancer. Keeps up on monthly self breast exam and yearly mammogram/exams.     Review of Systems:  ROS     Past Obstetrical History:    G0    Past Gynecological History:    Last pap: Unsure    H/o abnormal pap: No    H/o STIs: No    DEXA:2020 normal    Last Mammogram: 11/9/22 BIRADS 2     LMP: No LMP recorded. Patient is postmenopausal.    BCM: Postmenopause    Past Medical History    Past Medical History:   Diagnosis Date    Anesthesia     PONV    Arrhythmia     Arthritis     osteo neck right knee back right shoulder    Back pain     Breath shortness     related to pain    Chest pain May 2016    PET scan with no infarct or ischemia, normal LVEF.    Chickenpox     DDD (degenerative disc disease), cervical     Disorder of thyroid     Enlarged tonsils     Removed with adenoids    GERD (gastroesophageal reflux disease)     [place] measles     Hemorrhoids     bleeding    High cholesterol     Hypertension     pt states well controlled on meds    Obesity     Pain     right shoulder/ torn rotator cuff    PONV (postoperative nausea and vomiting)     Psychiatric disorder     Anxiety  not taking any medications    Skin cancer     skin    Sleep apnea     uses cpap    Snoring        Past Surgical History    Past Surgical History:   Procedure Laterality Date    PB RECONSTR TOTAL SHOULDER IMPLANT Right 8/14/2019    Procedure: ARTHROPLASTY, SHOULDER, TOTAL- REVERSE;  Surgeon: Parish Maria M.D.;  Location: SURGERY [facility];   Service: Orthopedics    CARPAL TUNNEL ENDOSCOPIC  10/28/2011    Performed by EDGAR GARCIA at SURGERY SAME DAY [facility] ORS    CARPAL TUNNEL ENDOSCOPIC  10/17/2011    Performed by EDGAR GARCIA at SURGERY SAME DAY [facility] ORS    HYSTEROSCOPY WITH VIDEO DIAGNOSTIC  10/7/08    Performed by MICHELINE WEBER at SURGERY SAME DAY [facility] ORS    DILATION AND CURETTAGE  10/7/08    [facility]    LUMPECTOMY Right 2007    Breast lump removed    CARPAL TUNNEL RELEASE      OTHER      hemorrhoid banding    OTHER CARDIAC SURGERY      Cath - 4-5 years ago with challapalli    TONSILLECTOMY         Family History   Problem Relation Age of Onset    Cancer Mother         Breast cancer    Cancer Sister         Breast cancer    Cancer Other     Cancer Paternal Grandfather         liver    Cancer Father         Pancreatic cancer    Cancer Maternal Aunt     Heart Disease Neg Hx        Allergies    Allergies   Allergen Reactions    Other Drug Hives     Vicodin     Aleve [Gnp Naproxen Sodium]      hives    Latex Rash    Naproxen Sodium Hives    Other Misc Swelling     Some soap & toothpaste puffs, colored laundry soap dryer sheets    Skelaxin [Metaxalone] Hives    Sunscreens Hives       Medications    Current Outpatient Medications   Medication Sig Dispense Refill    hydroCHLOROthiazide (HYDRODIURIL) 25 MG Tab TAKE 1 TABLET EVERY EVENING 90 Tablet 0    levothyroxine (SYNTHROID) 75 MCG Tab TAKE 1 TABLET EVERY DAY 90 Tablet 0    metoprolol SR (TOPROL XL) 50 MG TABLET SR 24 HR TAKE 1 TABLET EVERY DAY 90 Tablet 0    lisinopril (PRINIVIL) 40 MG tablet TAKE 1 TABLET EVERY DAY 90 Tablet 0    rosuvastatin (CRESTOR) 10 MG Tab TAKE 1 TABLET EVERY EVENING 90 Tablet 0    acetaminophen (TYLENOL) 500 MG Tab Take 500-1,000 mg by mouth every 6 hours as needed.      CALCIUM PO Take 2 Tabs by mouth every evening.      Cholecalciferol (VITAMIN D) 2000 UNIT Tab Take 2,000 Units by mouth  every evening.      Probiotic Product (PROBIOTIC-10 PO) Take 1 Tab by mouth every day.      polyethylene glycol 3350 (MIRALAX) 17 GM/SCOOP Powder Take 17 g by mouth every day.      multivitamin (THERAGRAN) Tab Take 2 Tabs by mouth every evening.      Fiber Powder Take 1 Each by mouth every day.       No current facility-administered medications for this visit.       Social    Social History     Tobacco Use    Smoking status: Never    Smokeless tobacco: Never   Vaping Use    Vaping Use: Never used   Substance Use Topics    Alcohol use: No     Alcohol/week: 0.0 oz    Drug use: No        OBJECTIVE:    Vitals    /66   Wt 207 lb   BMI 37.86 kg/m²     Physical Exam    GENERAL: Well developed, well nourished, female in no acute distress.    HEENT: NCAT, mucus membranes moist    Neck: Supple, nontender, no DAREN, no thyromegaly    Breasts: Symmetric, Nontender, no masses, no nipple discharge, no skin changes    CV: RRR    Pulm: CTAB    Abdomen: Soft ND NT.    Ext: STARKS    : Normal Vulva, vagina. No lesions present. No abnormal discharge. No blood.    Cervix smooth pale atrophic no lesions, discharge or blood.     Uterus small midline mobile nontender    Adnexa no masses or tenderness.     Labs/Pathology:    None    A/P    Patient Active Problem List   Diagnosis    GERD (gastroesophageal reflux disease)    Hypothyroid    Dyslipidemia    HTN (hypertension)    CKD (chronic kidney disease) stage 3, GFR 30-59 ml/min ([facility])    Chronic lower back pain    Chronic pain of right knee    Cervical spine pain    Thoracic spine pain    GENIA (obstructive sleep apnea)    Rosacea    Ventricular ectopic beats    SVT (supraventricular tachycardia) ([facility])       Prince Mazariegos    72 y.o.        1. Well woman exam with routine gynecological exam    Pap not collected per current guidelines, due to age > 65. Encouraged yearly pelvic and breast exam. Monthly self breast exam encouraged. Up to date with breast cancer screening, DEXA.    F/u with PCP as scheduled for routine health maintenance/exams.     RTC in 1 year or PRN.     Total time spent is 40 minutes, outside of the well woman exam.      Mani Camargo M.D.    Obstetrics and Gynecology    11/30/202210:28 AM

## 2022-12-25 ENCOUNTER — OFFICE VISIT (OUTPATIENT)
Dept: URGENT CARE | Facility: CLINIC | Age: 72
End: 2022-12-25
Payer: MEDICARE

## 2022-12-25 VITALS
SYSTOLIC BLOOD PRESSURE: 112 MMHG | WEIGHT: 203 LBS | HEIGHT: 62 IN | RESPIRATION RATE: 16 BRPM | TEMPERATURE: 97.2 F | DIASTOLIC BLOOD PRESSURE: 64 MMHG | HEART RATE: 81 BPM | BODY MASS INDEX: 37.36 KG/M2 | OXYGEN SATURATION: 99 %

## 2022-12-25 DIAGNOSIS — N18.30 STAGE 3 CHRONIC KIDNEY DISEASE, UNSPECIFIED WHETHER STAGE 3A OR 3B CKD: ICD-10-CM

## 2022-12-25 DIAGNOSIS — J06.9 URI WITH COUGH AND CONGESTION: ICD-10-CM

## 2022-12-25 DIAGNOSIS — I10 PRIMARY HYPERTENSION: ICD-10-CM

## 2022-12-25 DIAGNOSIS — R06.2 WHEEZE: ICD-10-CM

## 2022-12-25 PROCEDURE — 99214 OFFICE O/P EST MOD 30 MIN: CPT | Performed by: FAMILY MEDICINE

## 2022-12-25 RX ORDER — BENZONATATE 100 MG/1
200 CAPSULE ORAL 3 TIMES DAILY PRN
Qty: 30 CAPSULE | Refills: 1 | Status: SHIPPED | OUTPATIENT
Start: 2022-12-25 | End: 2022-12-29

## 2022-12-25 RX ORDER — ALBUTEROL SULFATE 90 UG/1
2 AEROSOL, METERED RESPIRATORY (INHALATION) EVERY 6 HOURS PRN
Qty: 8.5 G | Refills: 3 | Status: SHIPPED | OUTPATIENT
Start: 2022-12-25 | End: 2023-05-24

## 2022-12-25 RX ORDER — DEXAMETHASONE SODIUM PHOSPHATE 4 MG/ML
8 INJECTION, SOLUTION INTRA-ARTICULAR; INTRALESIONAL; INTRAMUSCULAR; INTRAVENOUS; SOFT TISSUE ONCE
Status: COMPLETED | OUTPATIENT
Start: 2022-12-25 | End: 2022-12-25

## 2022-12-25 RX ADMIN — DEXAMETHASONE SODIUM PHOSPHATE 8 MG: 4 INJECTION, SOLUTION INTRA-ARTICULAR; INTRALESIONAL; INTRAMUSCULAR; INTRAVENOUS; SOFT TISSUE at 10:14

## 2022-12-25 ASSESSMENT — ENCOUNTER SYMPTOMS
SORE THROAT: 1
COUGH: 1

## 2022-12-25 NOTE — PROGRESS NOTES
Subjective     Prince Mazariegos is a 72 y.o. female who presents with Cough (X4days, States its not breaking up, concern for bronchitis, slight sore throat from cough )      - This is a pleasant and nontoxic appearing 72 y.o. female who has come to the walk-in clinic today for:    #1) 4-5 days dry cough and wheeze. No NVFC/cp/sob. A little sore throat from coughing. Otc cold meds not helping    #2) Hx CKD, GFR  54  3/21    #3) Hx HTN, stable on current meds      ALLERGIES:  Other drug, Aleve [gnp naproxen sodium], Latex, Naproxen sodium, Other misc, Skelaxin [metaxalone], and Sunscreens     PMH:  Past Medical History:   Diagnosis Date    Anesthesia     PONV    Arrhythmia     Arthritis     osteo neck right knee back right shoulder    Back pain     Breath shortness     related to pain    Chest pain May 2016    PET scan with no infarct or ischemia, normal LVEF.    Chickenpox     DDD (degenerative disc disease), cervical     Disorder of thyroid     Enlarged tonsils     Removed with adenoids    GERD (gastroesophageal reflux disease)     Romans[place] measles     Hemorrhoids     bleeding    High cholesterol     Hypertension     pt states well controlled on meds    Obesity     Pain     right shoulder/ torn rotator cuff    PONV (postoperative nausea and vomiting)     Psychiatric disorder     Anxiety  not taking any medications    Skin cancer     skin    Sleep apnea     uses cpap    Snoring         PSH:  Past Surgical History:   Procedure Laterality Date    PB RECONSTR TOTAL SHOULDER IMPLANT Right 8/14/2019    Procedure: ARTHROPLASTY, SHOULDER, TOTAL- REVERSE;  Surgeon: Parish Garcia M.D.;  Location: SURGERY [facility];  Service: Orthopedics    CARPAL TUNNEL ENDOSCOPIC  10/28/2011    Performed by EDGAR GARCIA at SURGERY SAME DAY [facility]    CARPAL TUNNEL ENDOSCOPIC  10/17/2011    Performed by EDGAR GARCIA at SURGERY SAME DAY [facility]    HYSTEROSCOPY WITH VIDEO DIAGNOSTIC  10/7/08    Performed  by MICHELINE WEBER at SURGERY SAME DAY [facility]    DILATION AND CURETTAGE  10/7/08    Performed by MICHELINE WEBER at SURGERY SAME DAY [facility]    LUMPECTOMY Right 2007    Breast lump removed    CARPAL TUNNEL RELEASE      OTHER      hemorrhoid banding    OTHER CARDIAC SURGERY      Cath - 4-5 years ago with nicolasaapallilya    TONSILLECTOMY         MEDS:    Current Outpatient Medications:     albuterol 108 (90 Base) MCG/ACT Aero Soln inhalation aerosol, Inhale 2 Puffs every 6 hours as needed (cough/wheeze)., Disp: 8.5 g, Rfl: 3    benzonatate (TESSALON) 100 MG Cap, Take 2 Capsules by mouth 3 times a day as needed for Cough., Disp: 30 Capsule, Rfl: 1    hydroCHLOROthiazide (HYDRODIURIL) 25 MG Tab, TAKE 1 TABLET EVERY EVENING, Disp: 90 Tablet, Rfl: 0    levothyroxine (SYNTHROID) 75 MCG Tab, TAKE 1 TABLET EVERY DAY, Disp: 90 Tablet, Rfl: 0    metoprolol SR (TOPROL XL) 50 MG TABLET SR 24 HR, TAKE 1 TABLET EVERY DAY, Disp: 90 Tablet, Rfl: 0    lisinopril (PRINIVIL) 40 MG tablet, TAKE 1 TABLET EVERY DAY, Disp: 90 Tablet, Rfl: 0    rosuvastatin (CRESTOR) 10 MG Tab, TAKE 1 TABLET EVERY EVENING, Disp: 90 Tablet, Rfl: 0    acetaminophen (TYLENOL) 500 MG Tab, Take 500-1,000 mg by mouth every 6 hours as needed., Disp: , Rfl:     CALCIUM PO, Take 2 Tabs by mouth every evening., Disp: , Rfl:     Cholecalciferol (VITAMIN D) 2000 UNIT Tab, Take 2,000 Units by mouth every evening., Disp: , Rfl:     polyethylene glycol 3350 (MIRALAX) 17 GM/SCOOP Powder, Take 17 g by mouth every day., Disp: , Rfl:     multivitamin (THERAGRAN) Tab, Take 2 Tabs by mouth every evening., Disp: , Rfl:     Fiber Powder, Take 1 Each by mouth every day., Disp: , Rfl:     Current Facility-Administered Medications:     dexamethasone (DECADRON) injection 8 mg, 8 mg, Oral, Once, Robin Givens M.D.    ** I have documented what I find to be significant in regards to past medical, social, family and surgical history  in my HPI or under PMH/PSH/FH review  "section, otherwise it is noncontributory **         HPI    Review of Systems   HENT:  Positive for sore throat.    Respiratory:  Positive for cough.    All other systems reviewed and are negative.           Objective     /64   Pulse 81   Temp 36.2 °C (97.2 °F) (Temporal)   Resp 16   Ht 1.575 m (5' 2\")   Wt 92.1 kg (203 lb)   SpO2 99%   BMI 37.13 kg/m²      Physical Exam  Vitals and nursing note reviewed.   Constitutional:       General: She is not in acute distress.     Appearance: Normal appearance. She is well-developed.   HENT:      Head: Normocephalic.      Mouth/Throat:      Mouth: Mucous membranes are moist.      Pharynx: Oropharynx is clear.   Cardiovascular:      Heart sounds: Normal heart sounds. No murmur heard.  Pulmonary:      Effort: Pulmonary effort is normal. No respiratory distress.      Breath sounds: Wheezing (faint end exp wheeze) present.   Neurological:      Mental Status: She is alert.      Motor: No abnormal muscle tone.   Psychiatric:         Mood and Affect: Mood normal.         Behavior: Behavior normal.                           Assessment & Plan       1. URI with cough and congestion  albuterol 108 (90 Base) MCG/ACT Aero Soln inhalation aerosol    benzonatate (TESSALON) 100 MG Cap    dexamethasone (DECADRON) injection 8 mg      2. Wheeze        3. Stage 3 chronic kidney disease, unspecified whether stage 3a or 3b CKD (HCC)        4. Primary hypertension            - Dx, plan & d/c instructions discussed   - Rest, stay hydrated  - OTC Motrin and/or Tylenol as needed      Follow up with your regular primary care providers office for a recheck on today's visit. ER if not improving in 2-3 days or if feeling/getting worse.      Patient left in stable condition     Pertinent prior lab work and/or imaging studies in Epic have been reviewed by me today on day of this visit.               "

## 2022-12-28 ENCOUNTER — APPOINTMENT (OUTPATIENT)
Dept: URGENT CARE | Facility: CLINIC | Age: 72
End: 2022-12-28
Payer: MEDICARE

## 2022-12-29 ENCOUNTER — OFFICE VISIT (OUTPATIENT)
Dept: URGENT CARE | Facility: CLINIC | Age: 72
End: 2022-12-29
Payer: MEDICARE

## 2022-12-29 ENCOUNTER — APPOINTMENT (OUTPATIENT)
Dept: RADIOLOGY | Facility: IMAGING CENTER | Age: 72
End: 2022-12-29
Attending: FAMILY MEDICINE
Payer: MEDICARE

## 2022-12-29 VITALS
HEIGHT: 62 IN | DIASTOLIC BLOOD PRESSURE: 58 MMHG | SYSTOLIC BLOOD PRESSURE: 116 MMHG | HEART RATE: 92 BPM | OXYGEN SATURATION: 95 % | RESPIRATION RATE: 16 BRPM | WEIGHT: 203 LBS | BODY MASS INDEX: 37.36 KG/M2 | TEMPERATURE: 97 F

## 2022-12-29 DIAGNOSIS — J22 LRTI (LOWER RESPIRATORY TRACT INFECTION): ICD-10-CM

## 2022-12-29 DIAGNOSIS — I10 PRIMARY HYPERTENSION: ICD-10-CM

## 2022-12-29 DIAGNOSIS — R06.2 WHEEZE: ICD-10-CM

## 2022-12-29 DIAGNOSIS — N18.30 STAGE 3 CHRONIC KIDNEY DISEASE, UNSPECIFIED WHETHER STAGE 3A OR 3B CKD: ICD-10-CM

## 2022-12-29 PROCEDURE — 94640 AIRWAY INHALATION TREATMENT: CPT | Performed by: FAMILY MEDICINE

## 2022-12-29 PROCEDURE — 99214 OFFICE O/P EST MOD 30 MIN: CPT | Mod: 25 | Performed by: FAMILY MEDICINE

## 2022-12-29 PROCEDURE — 71046 X-RAY EXAM CHEST 2 VIEWS: CPT | Mod: TC,FY | Performed by: FAMILY MEDICINE

## 2022-12-29 RX ORDER — PREDNISONE 10 MG/1
30 TABLET ORAL EVERY MORNING
Qty: 15 TABLET | Refills: 0 | Status: SHIPPED | OUTPATIENT
Start: 2022-12-29 | End: 2023-01-03

## 2022-12-29 RX ORDER — CODEINE PHOSPHATE AND GUAIFENESIN 10; 100 MG/5ML; MG/5ML
10 SOLUTION ORAL EVERY 6 HOURS PRN
Qty: 180 ML | Refills: 0 | Status: SHIPPED | OUTPATIENT
Start: 2022-12-29 | End: 2023-01-05

## 2022-12-29 RX ORDER — ALBUTEROL SULFATE 2.5 MG/3ML
2.5 SOLUTION RESPIRATORY (INHALATION) ONCE
Status: COMPLETED | OUTPATIENT
Start: 2022-12-29 | End: 2022-12-29

## 2022-12-29 RX ORDER — DOXYCYCLINE 100 MG/1
100 CAPSULE ORAL 2 TIMES DAILY
Qty: 14 CAPSULE | Refills: 0 | Status: SHIPPED | OUTPATIENT
Start: 2022-12-29 | End: 2023-02-14

## 2022-12-29 RX ADMIN — ALBUTEROL SULFATE 2.5 MG: 2.5 SOLUTION RESPIRATORY (INHALATION) at 08:46

## 2022-12-29 ASSESSMENT — ENCOUNTER SYMPTOMS: COUGH: 1

## 2022-12-29 NOTE — PROGRESS NOTES
"Subjective     Prince Mazariegos is a 72 y.o. female who presents with Cough (\" I still have a deep cough, nothing seems to be breaking up in my chest. Cough seems to be getting worse.\")      - This is a pleasant and nontoxic appearing 72 y.o. female who has come to the walk-in clinic today for:    #1) ongoing cough ~1.5wks, seen few days ago and meds not helping. Non productive. No NVFC      Hx CKD, GFR  44   5/21      HTN stable on current meds         ALLERGIES:  Other drug, Aleve [gnp naproxen sodium], Latex, Naproxen sodium, Other misc, Skelaxin [metaxalone], and Sunscreens     PMH:  Past Medical History:   Diagnosis Date    Anesthesia     PONV    Arrhythmia     Arthritis     osteo neck right knee back right shoulder    Back pain     Breath shortness     related to pain    Chest pain May 2016    PET scan with no infarct or ischemia, normal LVEF.    Chickenpox     DDD (degenerative disc disease), cervical     Disorder of thyroid     Enlarged tonsils     Removed with adenoids    GERD (gastroesophageal reflux disease)     [place] measles     Hemorrhoids     bleeding    High cholesterol     Hypertension     pt states well controlled on meds    Obesity     Pain     right shoulder/ torn rotator cuff    PONV (postoperative nausea and vomiting)     Psychiatric disorder     Anxiety  not taking any medications    Skin cancer     skin    Sleep apnea     uses cpap    Snoring         PSH:  Past Surgical History:   Procedure Laterality Date    PB RECONSTR TOTAL SHOULDER IMPLANT Right 8/14/2019    Procedure: ARTHROPLASTY, SHOULDER, TOTAL- REVERSE;  Surgeon: Parish Garcia M.D.;  Location: SURGERY [facility];  Service: Orthopedics    CARPAL TUNNEL ENDOSCOPIC  10/28/2011    Performed by EDGAR GARCIA at SURGERY SAME DAY [facility]    CARPAL TUNNEL ENDOSCOPIC  10/17/2011    Performed by EDGAR GARCIA at SURGERY SAME DAY [facility]    HYSTEROSCOPY WITH VIDEO DIAGNOSTIC  10/7/08    Performed by " MICHELINE WEBER at SURGERY SAME DAY [facility]    DILATION AND CURETTAGE  10/7/08    Performed by MICHELINE WEBER at SURGERY SAME DAY [facility]    LUMPECTOMY Right 2007    Breast lump removed    CARPAL TUNNEL RELEASE      OTHER      hemorrhoid banding    OTHER CARDIAC SURGERY      Cath - 4-5 years ago with challapalli    TONSILLECTOMY         MEDS:    Current Outpatient Medications:     predniSONE (DELTASONE) 10 MG Tab, Take 3 Tablets by mouth every morning for 5 days., Disp: 15 Tablet, Rfl: 0    doxycycline (MONODOX) 100 MG capsule, Take 1 Capsule by mouth 2 times a day., Disp: 14 Capsule, Rfl: 0    guaifenesin-codeine (ROBITUSSIN AC) Solution oral solution, Take 10 mL by mouth every 6 hours as needed for Cough for up to 7 days., Disp: 180 mL, Rfl: 0    albuterol 108 (90 Base) MCG/ACT Aero Soln inhalation aerosol, Inhale 2 Puffs every 6 hours as needed (cough/wheeze)., Disp: 8.5 g, Rfl: 3    hydroCHLOROthiazide (HYDRODIURIL) 25 MG Tab, TAKE 1 TABLET EVERY EVENING, Disp: 90 Tablet, Rfl: 0    levothyroxine (SYNTHROID) 75 MCG Tab, TAKE 1 TABLET EVERY DAY, Disp: 90 Tablet, Rfl: 0    metoprolol SR (TOPROL XL) 50 MG TABLET SR 24 HR, TAKE 1 TABLET EVERY DAY, Disp: 90 Tablet, Rfl: 0    lisinopril (PRINIVIL) 40 MG tablet, TAKE 1 TABLET EVERY DAY, Disp: 90 Tablet, Rfl: 0    rosuvastatin (CRESTOR) 10 MG Tab, TAKE 1 TABLET EVERY EVENING, Disp: 90 Tablet, Rfl: 0    acetaminophen (TYLENOL) 500 MG Tab, Take 500-1,000 mg by mouth every 6 hours as needed., Disp: , Rfl:     CALCIUM PO, Take 2 Tabs by mouth every evening., Disp: , Rfl:     Cholecalciferol (VITAMIN D) 2000 UNIT Tab, Take 2,000 Units by mouth every evening., Disp: , Rfl:     polyethylene glycol 3350 (MIRALAX) 17 GM/SCOOP Powder, Take 17 g by mouth every day., Disp: , Rfl:     multivitamin (THERAGRAN) Tab, Take 2 Tabs by mouth every evening., Disp: , Rfl:     Fiber Powder, Take 1 Each by mouth every day., Disp: , Rfl:     ** I have documented what I find to  "be significant in regards to past medical, social, family and surgical history  in my HPI or under PMH/PSH/FH review section, otherwise it is noncontributory **         HPI    Review of Systems   Respiratory:  Positive for cough.    All other systems reviewed and are negative.           Objective     /58 (BP Location: Left arm, Patient Position: Sitting, BP Cuff Size: Adult)   Pulse 92   Temp 36.1 °C (97 °F) (Temporal)   Resp 16   Ht 1.575 m (5' 2\")   Wt 92.1 kg (203 lb)   SpO2 95%   BMI 37.13 kg/m²      Physical Exam  Vitals and nursing note reviewed.   Constitutional:       General: She is not in acute distress.     Appearance: Normal appearance. She is well-developed.   HENT:      Head: Normocephalic.      Mouth/Throat:      Mouth: Mucous membranes are moist.      Pharynx: Oropharynx is clear. No oropharyngeal exudate or posterior oropharyngeal erythema.   Cardiovascular:      Heart sounds: Normal heart sounds. No murmur heard.  Pulmonary:      Effort: Pulmonary effort is normal. No respiratory distress.      Breath sounds: Wheezing (faint end exp wheeze) present.   Neurological:      Mental Status: She is alert.      Motor: No abnormal muscle tone.   Psychiatric:         Mood and Affect: Mood normal.         Behavior: Behavior normal.                           Assessment & Plan       1. LRTI (lower respiratory tract infection)  albuterol (PROVENTIL) 2.5mg/3ml nebulizer solution 2.5 mg    DX-CHEST-2 VIEWS    doxycycline (MONODOX) 100 MG capsule    guaifenesin-codeine (ROBITUSSIN AC) Solution oral solution      2. Wheeze  albuterol (PROVENTIL) 2.5mg/3ml nebulizer solution 2.5 mg    DX-CHEST-2 VIEWS    predniSONE (DELTASONE) 10 MG Tab      3. Stage 3 chronic kidney disease, unspecified whether stage 3a or 3b CKD (HCC)        4. Primary hypertension            Feels improved after neb     - Dx, plan & d/c instructions discussed   - Rest, stay hydrated      Follow up with your regular primary care " providers office for a recheck on today's visit. ER if not improving in 2-3 days or if feeling/getting worse.     Any realistic side effects of medications that may have been given today reviewed.     Patient left in stable condition     Today's radiology imaging personally reviewed by me today on day of visit and Radiology readings reviewed and discussed w/ patient today.      reviewed if narcotics given

## 2023-01-01 NOTE — PROCEDURE: ADDITIONAL NOTES
- - -
Additional Notes: RX for Finacea and Metrogel was sent 11/2017\\n-patient did not get the RX’s \\n-confirmed they are there.\\n\\nStart Doxycycline 40mg 1 PO daily
Additional Notes: Plan\\nRestart finacea\\nStart doxy 40 mg qd

## 2023-01-11 ENCOUNTER — TELEPHONE (OUTPATIENT)
Dept: MEDICAL GROUP | Age: 73
End: 2023-01-11
Payer: MEDICARE

## 2023-01-11 DIAGNOSIS — E78.00 PURE HYPERCHOLESTEROLEMIA: ICD-10-CM

## 2023-01-11 DIAGNOSIS — E03.4 HYPOTHYROIDISM DUE TO ACQUIRED ATROPHY OF THYROID: ICD-10-CM

## 2023-01-11 NOTE — TELEPHONE ENCOUNTER
VOICEMAIL  1. Caller Name: Prince Mazariegos                        Call Back Number: [phone] (home)       2. Message: patient would like to have lab work ordered for her taylor with you on the  20th     3. Patient approves office to leave a detailed voicemail/MyChart message: N\A

## 2023-01-20 ENCOUNTER — HOSPITAL ENCOUNTER (OUTPATIENT)
Dept: LAB | Facility: MEDICAL CENTER | Age: 73
End: 2023-01-20
Attending: FAMILY MEDICINE
Payer: MEDICARE

## 2023-01-20 DIAGNOSIS — E78.00 PURE HYPERCHOLESTEROLEMIA: ICD-10-CM

## 2023-01-20 DIAGNOSIS — E03.4 HYPOTHYROIDISM DUE TO ACQUIRED ATROPHY OF THYROID: ICD-10-CM

## 2023-01-20 LAB
ALBUMIN SERPL BCP-MCNC: 4.2 G/DL (ref 3.2–4.9)
ALBUMIN/GLOB SERPL: 1.6 G/DL
ALP SERPL-CCNC: 94 U/L (ref 30–99)
ALT SERPL-CCNC: 15 U/L (ref 2–50)
ANION GAP SERPL CALC-SCNC: 11 MMOL/L (ref 7–16)
AST SERPL-CCNC: 16 U/L (ref 12–45)
BASOPHILS # BLD AUTO: 0.8 % (ref 0–1.8)
BASOPHILS # BLD: 0.06 K/UL (ref 0–0.12)
BILIRUB SERPL-MCNC: 0.5 MG/DL (ref 0.1–1.5)
BUN SERPL-MCNC: 24 MG/DL (ref 8–22)
CALCIUM ALBUM COR SERPL-MCNC: 10.3 MG/DL (ref 8.5–10.5)
CALCIUM SERPL-MCNC: 10.5 MG/DL (ref 8.5–10.5)
CHLORIDE SERPL-SCNC: 102 MMOL/L (ref 96–112)
CHOLEST SERPL-MCNC: 168 MG/DL (ref 100–199)
CO2 SERPL-SCNC: 24 MMOL/L (ref 20–33)
CREAT SERPL-MCNC: 1.27 MG/DL (ref 0.5–1.4)
EOSINOPHIL # BLD AUTO: 0.22 K/UL (ref 0–0.51)
EOSINOPHIL NFR BLD: 3 % (ref 0–6.9)
ERYTHROCYTE [DISTWIDTH] IN BLOOD BY AUTOMATED COUNT: 46.9 FL (ref 35.9–50)
GFR SERPLBLD CREATININE-BSD FMLA CKD-EPI: 45 ML/MIN/1.73 M 2
GLOBULIN SER CALC-MCNC: 2.6 G/DL (ref 1.9–3.5)
GLUCOSE SERPL-MCNC: 91 MG/DL (ref 65–99)
HCT VFR BLD AUTO: 35.2 % (ref 37–47)
HDLC SERPL-MCNC: 60 MG/DL
HGB BLD-MCNC: 11.8 G/DL (ref 12–16)
IMM GRANULOCYTES # BLD AUTO: 0.03 K/UL (ref 0–0.11)
IMM GRANULOCYTES NFR BLD AUTO: 0.4 % (ref 0–0.9)
LDLC SERPL CALC-MCNC: 80 MG/DL
LYMPHOCYTES # BLD AUTO: 1.24 K/UL (ref 1–4.8)
LYMPHOCYTES NFR BLD: 17.1 % (ref 22–41)
MCH RBC QN AUTO: 31.8 PG (ref 27–33)
MCHC RBC AUTO-ENTMCNC: 33.5 G/DL (ref 33.6–35)
MCV RBC AUTO: 94.9 FL (ref 81.4–97.8)
MONOCYTES # BLD AUTO: 0.52 K/UL (ref 0–0.85)
MONOCYTES NFR BLD AUTO: 7.2 % (ref 0–13.4)
NEUTROPHILS # BLD AUTO: 5.19 K/UL (ref 2–7.15)
NEUTROPHILS NFR BLD: 71.5 % (ref 44–72)
NRBC # BLD AUTO: 0 K/UL
NRBC BLD-RTO: 0 /100 WBC
PLATELET # BLD AUTO: 256 K/UL (ref 164–446)
PMV BLD AUTO: 10.2 FL (ref 9–12.9)
POTASSIUM SERPL-SCNC: 4.7 MMOL/L (ref 3.6–5.5)
PROT SERPL-MCNC: 6.8 G/DL (ref 6–8.2)
RBC # BLD AUTO: 3.71 M/UL (ref 4.2–5.4)
SODIUM SERPL-SCNC: 137 MMOL/L (ref 135–145)
T3FREE SERPL-MCNC: 2.79 PG/ML (ref 2–4.4)
T4 FREE SERPL-MCNC: 1.51 NG/DL (ref 0.93–1.7)
TRIGL SERPL-MCNC: 140 MG/DL (ref 0–149)
TSH SERPL DL<=0.005 MIU/L-ACNC: 0.47 UIU/ML (ref 0.38–5.33)
WBC # BLD AUTO: 7.3 K/UL (ref 4.8–10.8)

## 2023-01-20 PROCEDURE — 84481 FREE ASSAY (FT-3): CPT

## 2023-01-20 PROCEDURE — 84439 ASSAY OF FREE THYROXINE: CPT

## 2023-01-20 PROCEDURE — 36415 COLL VENOUS BLD VENIPUNCTURE: CPT

## 2023-01-20 PROCEDURE — 80053 COMPREHEN METABOLIC PANEL: CPT

## 2023-01-20 PROCEDURE — 84443 ASSAY THYROID STIM HORMONE: CPT

## 2023-01-20 PROCEDURE — 80061 LIPID PANEL: CPT

## 2023-01-20 PROCEDURE — 85025 COMPLETE CBC W/AUTO DIFF WBC: CPT

## 2023-01-24 ENCOUNTER — OFFICE VISIT (OUTPATIENT)
Dept: MEDICAL GROUP | Age: 73
End: 2023-01-24
Payer: MEDICARE

## 2023-01-24 VITALS
OXYGEN SATURATION: 99 % | DIASTOLIC BLOOD PRESSURE: 64 MMHG | HEART RATE: 76 BPM | HEIGHT: 62 IN | WEIGHT: 204 LBS | BODY MASS INDEX: 37.54 KG/M2 | SYSTOLIC BLOOD PRESSURE: 126 MMHG | TEMPERATURE: 96.5 F

## 2023-01-24 DIAGNOSIS — G89.29 CHRONIC PAIN OF RIGHT KNEE: ICD-10-CM

## 2023-01-24 DIAGNOSIS — M25.561 CHRONIC PAIN OF RIGHT KNEE: ICD-10-CM

## 2023-01-24 DIAGNOSIS — E66.01 MORBID (SEVERE) OBESITY DUE TO EXCESS CALORIES (HCC): ICD-10-CM

## 2023-01-24 DIAGNOSIS — N18.31 CHRONIC KIDNEY DISEASE, STAGE 3A: ICD-10-CM

## 2023-01-24 DIAGNOSIS — I47.10 SVT (SUPRAVENTRICULAR TACHYCARDIA) (HCC): ICD-10-CM

## 2023-01-24 PROCEDURE — 99214 OFFICE O/P EST MOD 30 MIN: CPT | Performed by: FAMILY MEDICINE

## 2023-01-24 ASSESSMENT — FIBROSIS 4 INDEX: FIB4 SCORE: 1.13

## 2023-01-24 ASSESSMENT — PATIENT HEALTH QUESTIONNAIRE - PHQ9: CLINICAL INTERPRETATION OF PHQ2 SCORE: 0

## 2023-01-24 NOTE — RESULT ENCOUNTER NOTE
I discussed results and plan with patient, who stated understanding.       Akshat Reaves MD  [employer], [address] [address]

## 2023-01-24 NOTE — PROGRESS NOTES
This medical record contains text that has been entered with the assistance of computer voice recognition and dictation software.  Therefore, it may contain unintended errors in text, spelling, punctuation, or grammar      Chief Complaint   Patient presents with    Follow-Up         Prince Mazariegos is a 72 y.o. female here evaluation and management of: routine follow up      HPI:     HCC Gap Form    Diagnosis: E66.01 - Morbid (severe) obesity due to excess calories (HCC)  Z68.37 - Body mass index (BMI) 37.0-37.9, adult  Comorbidity Diagnosis: SVT (supraventricular tachycardia) (HCC)  The current BMI is 37.31 kg/m2 as of 01/24/23 08:45 PST  Assessment and plan: Chronic, stable. Encouraged healthy diet and physical activity changes with a goal of weight loss. Follow up at least annually. The comorbid condition is asymptomatic based on today's assessment. Continue current treatment plan. Follow up in 3 months.  Diagnosis to address: I47.1 - SVT (supraventricular tachycardia) (HCC)  Assessment and plan: Chronic, stable, as based on today's assessment and impact on other conditions evaluated today. Continue with current treatment plan:  Follow-up with specialist as directed, but at least annually.  Diagnosis: N18.31 - Chronic kidney disease, stage 3a (HCC)  Assessment and plan: Chronic, stable. Continue with current defined treatment plan: . Follow-up at least annually.  Last edited 01/24/23 08:48 PST by Akshat Reaves M.D.         1. Morbid (severe) obesity due to excess calories (HCC)  Patient states she knows she has to lose weight but she cannot do anything without chronic right knee pain.  She cannot walk she cannot use a bike she has not tried swimming or walking and swimming pool yet.  We discussed options of pharmacotherapy she wants to wait for another 3 months.    2. Chronic kidney disease, stage 3a (HCC)  This is a chronic and stable condition her creatinine is near baseline.  She knows to avoid all  NSAIDs and maintain blood pressure control     24 High   23 High   14  24 High   26 High   23 High   21    Creatinine 0.50 - 1.40 mg/dL 1.27  1.22  1.02  1.23          3. Chronic pain of right knee  The patient continues to have right knee pain which prevents her from walking or using elliptical or the treadmill.  She understands she needs to lose weight.  She will check to see if she has access to a pool.  She is not interested in pharmacotherapy yet.        Current medicines (including changes today)  Current Outpatient Medications   Medication Sig Dispense Refill    doxycycline (MONODOX) 100 MG capsule Take 1 Capsule by mouth 2 times a day. 14 Capsule 0    albuterol 108 (90 Base) MCG/ACT Aero Soln inhalation aerosol Inhale 2 Puffs every 6 hours as needed (cough/wheeze). 8.5 g 3    hydroCHLOROthiazide (HYDRODIURIL) 25 MG Tab TAKE 1 TABLET EVERY EVENING 90 Tablet 0    levothyroxine (SYNTHROID) 75 MCG Tab TAKE 1 TABLET EVERY DAY 90 Tablet 0    metoprolol SR (TOPROL XL) 50 MG TABLET SR 24 HR TAKE 1 TABLET EVERY DAY 90 Tablet 0    lisinopril (PRINIVIL) 40 MG tablet TAKE 1 TABLET EVERY DAY 90 Tablet 0    rosuvastatin (CRESTOR) 10 MG Tab TAKE 1 TABLET EVERY EVENING 90 Tablet 0    acetaminophen (TYLENOL) 500 MG Tab Take 500-1,000 mg by mouth every 6 hours as needed.      CALCIUM PO Take 2 Tabs by mouth every evening.      Cholecalciferol (VITAMIN D) 2000 UNIT Tab Take 2,000 Units by mouth every evening.      polyethylene glycol 3350 (MIRALAX) 17 GM/SCOOP Powder Take 17 g by mouth every day.      multivitamin (THERAGRAN) Tab Take 2 Tabs by mouth every evening.      Fiber Powder Take 1 Each by mouth every day.       No current facility-administered medications for this visit.     She  has a past medical history of Anesthesia, Arrhythmia, Arthritis, Back pain, Breath shortness, Chest pain (May 2016), Chickenpox, DDD (degenerative disc disease), cervical, Disorder of thyroid, Enlarged tonsils, GERD (gastroesophageal reflux  "disease), [place] measles, Hemorrhoids, High cholesterol, Hypertension, Obesity, Pain, PONV (postoperative nausea and vomiting), Psychiatric disorder, Skin cancer, Sleep apnea, and Snoring.  She  has a past surgical history that includes lumpectomy (Right, ); tonsillectomy; other cardiac surgery; other; hysteroscopy with video diagnostic (10/7/08); dilation and curettage (10/7/08); carpal tunnel endoscopic (10/17/2011); carpal tunnel endoscopic (10/28/2011); carpal tunnel release; and pr reconstr total shoulder implant (Right, 2019).  Social History     Tobacco Use    Smoking status: Never    Smokeless tobacco: Never   Vaping Use    Vaping Use: Never used   Substance Use Topics    Alcohol use: No     Alcohol/week: 0.0 oz    Drug use: No     Social History     Social History Narrative    Not on file     Family History   Problem Relation Age of Onset    Cancer Mother         Breast cancer    Cancer Sister         Breast cancer    Cancer Other     Cancer Paternal Grandfather         liver    Cancer Father         Pancreatic cancer    Cancer Maternal Aunt     Heart Disease Neg Hx      Family Status   Relation Name Status    Mo      Sis x1 Alive    OTHER cousin Alive    PGFa old age     Fa      Bro x1 Alive    MGMo old age     MGFa old age     PGMo old age     MAunt  Alive    Neg Hx  (Not Specified)         ROS    The pertinent  ROS findings can be seen in the HPI above.     All other systems reviewed and are negative     Objective:     /64 (BP Location: Left arm, Patient Position: Sitting, BP Cuff Size: Large adult)   Pulse 76   Temp 35.8 °C (96.5 °F) (Temporal)   Ht 1.575 m (5' 2\")   Wt 92.5 kg (204 lb)   SpO2 99%  Body mass index is 37.31 kg/m².      Physical Exam:    Constitutional: Alert, no distress.  Skin: No suspicious lesions  Eye: Equal, round and reactive, conjunctiva clear, lids normal.  ENMT: Lips without lesions, good dentition, oropharynx " clear.  Neck: Trachea midline, no masses, no thyromegaly. No cervical or supraclavicular lymphadenopathy.  Respiratory: Unlabored respiratory effort, lungs clear to auscultation, no wheezes, no ronchi.  Cardiovascular: Normal S1, S2, no murmur, no edema  Abdomen: Soft, non-tender, no masses, no hepatosplenomegaly.   Latest Reference Range & Units 01/20/23 11:17   Cholesterol,Tot 100 - 199 mg/dL 168   Triglycerides 0 - 149 mg/dL 140   HDL >=40 mg/dL 60   LDL <100 mg/dL 80        Latest Reference Range & Units 01/20/23 11:17   AST(SGOT) 12 - 45 U/L 16   ALT(SGPT) 2 - 50 U/L 15   Alkaline Phosphatase 30 - 99 U/L 94     Assessment and Plan:   The following treatment plan was discussed    All recent labs and provider notes reviewed    1. Morbid (severe) obesity due to excess calories (HCC)  I recommended that we start pharmacotherapy  She would like another 3 months for lifestyle modification    2. Chronic kidney disease, stage 3a (HCC)  Continue bp control  Avoid Nsaids and all nephrotoxins  Renally dose all medications when indicated  Maintain visits with nephrology    3. Chronic pain of right knee    Recommended weight loss  She is to look for a swimming pool that she can use to at least walk in the pool to have some sort of activity.        Instructed to Follow up in clinic or ER for worsening symptoms, difficulty breathing, lack of expected recovery, or should new symptoms or problems arise.    Followup: Return in about 3 months (around 4/24/2023) for Reevaluation.

## 2023-02-13 ENCOUNTER — HOSPITAL ENCOUNTER (EMERGENCY)
Facility: MEDICAL CENTER | Age: 73
End: 2023-02-13
Attending: EMERGENCY MEDICINE
Payer: MEDICARE

## 2023-02-13 ENCOUNTER — APPOINTMENT (OUTPATIENT)
Dept: RADIOLOGY | Facility: MEDICAL CENTER | Age: 73
End: 2023-02-13
Attending: EMERGENCY MEDICINE
Payer: MEDICARE

## 2023-02-13 VITALS
TEMPERATURE: 98 F | HEIGHT: 62 IN | SYSTOLIC BLOOD PRESSURE: 131 MMHG | RESPIRATION RATE: 16 BRPM | WEIGHT: 204.15 LBS | OXYGEN SATURATION: 98 % | BODY MASS INDEX: 37.57 KG/M2 | HEART RATE: 62 BPM | DIASTOLIC BLOOD PRESSURE: 68 MMHG

## 2023-02-13 DIAGNOSIS — M76.32 ILIOTIBIAL BAND SYNDROME OF LEFT SIDE: ICD-10-CM

## 2023-02-13 PROCEDURE — 99283 EMERGENCY DEPT VISIT LOW MDM: CPT

## 2023-02-13 PROCEDURE — 700111 HCHG RX REV CODE 636 W/ 250 OVERRIDE (IP): Performed by: EMERGENCY MEDICINE

## 2023-02-13 PROCEDURE — 73552 X-RAY EXAM OF FEMUR 2/>: CPT | Mod: LT

## 2023-02-13 PROCEDURE — 72170 X-RAY EXAM OF PELVIS: CPT

## 2023-02-13 PROCEDURE — 20552 NJX 1/MLT TRIGGER POINT 1/2: CPT

## 2023-02-13 RX ORDER — BUPIVACAINE HYDROCHLORIDE 2.5 MG/ML
20 INJECTION, SOLUTION EPIDURAL; INFILTRATION; INTRACAUDAL ONCE
Status: COMPLETED | OUTPATIENT
Start: 2023-02-13 | End: 2023-02-13

## 2023-02-13 RX ORDER — TRIAMCINOLONE ACETONIDE 40 MG/ML
40 INJECTION, SUSPENSION INTRA-ARTICULAR; INTRAMUSCULAR ONCE
Status: COMPLETED | OUTPATIENT
Start: 2023-02-13 | End: 2023-02-13

## 2023-02-13 RX ADMIN — BUPIVACAINE HYDROCHLORIDE 20 ML: 2.5 INJECTION, SOLUTION EPIDURAL; INFILTRATION; INTRACAUDAL; PERINEURAL at 12:45

## 2023-02-13 RX ADMIN — TRIAMCINOLONE ACETONIDE 40 MG: 40 INJECTION, SUSPENSION INTRA-ARTICULAR; INTRAMUSCULAR at 12:45

## 2023-02-13 ASSESSMENT — LIFESTYLE VARIABLES
HAVE PEOPLE ANNOYED YOU BY CRITICIZING YOUR DRINKING: NO
HAVE YOU EVER FELT YOU SHOULD CUT DOWN ON YOUR DRINKING: NO
AVERAGE NUMBER OF DAYS PER WEEK YOU HAVE A DRINK CONTAINING ALCOHOL: 0
HOW MANY TIMES IN THE PAST YEAR HAVE YOU HAD 5 OR MORE DRINKS IN A DAY: 0
TOTAL SCORE: 0
TOTAL SCORE: 0
DO YOU DRINK ALCOHOL: NO
EVER HAD A DRINK FIRST THING IN THE MORNING TO STEADY YOUR NERVES TO GET RID OF A HANGOVER: NO
CONSUMPTION TOTAL: NEGATIVE
TOTAL SCORE: 0
ON A TYPICAL DAY WHEN YOU DRINK ALCOHOL HOW MANY DRINKS DO YOU HAVE: 0
EVER FELT BAD OR GUILTY ABOUT YOUR DRINKING: NO

## 2023-02-13 ASSESSMENT — PAIN DESCRIPTION - DESCRIPTORS: DESCRIPTORS: ACHING;SHARP

## 2023-02-13 ASSESSMENT — FIBROSIS 4 INDEX: FIB4 SCORE: 1.13

## 2023-02-13 NOTE — ED TRIAGE NOTES
Chief Complaint   Patient presents with    Hip Pain      Complains of left hip pain radiates down the leg. Pt denies  any recent trauma. Pain is worse with sitting. Taking tylenol with no relief.

## 2023-02-13 NOTE — ED PROVIDER NOTES
ED Provider Note    CHIEF COMPLAINT  Chief Complaint   Patient presents with    Hip Pain       LIMITATION TO HISTORY   Select: None    HPI    Prince Mazariegos is a 72 y.o. female who presents to the Emergency Department with chief complaint of hip pain.  She presents back of her pelvis.  She states it radiates down her left side of the iliotibial area.  Constant.  It is worse when she is sitting or standing is better and pain-free when she is lying down.  There is no signs of numbness or tingling of her toes discoloration of her toes.  Duration has been 2 days.    She states she did some mopping on Friday.  Started with the pain on Saturday.  She is taken some Tylenol she does not take anti-inflammatories because it hurt her kidneys in the past.  Therefore the patient has been only taking his medications as needed for pain but not getting better.  She is here now for pain relief.  No history of fractures.  No history of trauma she did not fall down.    OUTSIDE HISTORIAN(S):  Select: No outside historian    EXTERNAL RECORDS REVIEWED  Select: Other reviewed the x-ray records no fracture noted recently.    REVIEW OF SYSTEMS  Neuro: No fever or chills  Neurologic: No numbness or tingling of the toes  Cardiovascular no discoloration of her toes  Musculoskeletal see above  Dermatologic no rashes or abrasions      PAST MEDICAL HISTORY  Past Medical History:   Diagnosis Date    Anesthesia     PONV    Arrhythmia     Arthritis     osteo neck right knee back right shoulder    Back pain     Breath shortness     related to pain    Chest pain May 2016    PET scan with no infarct or ischemia, normal LVEF.    Chickenpox     DDD (degenerative disc disease), cervical     Disorder of thyroid     Enlarged tonsils     Removed with adenoids    GERD (gastroesophageal reflux disease)     [place] measles     Hemorrhoids     bleeding    High cholesterol     Hypertension     pt states well controlled on meds    Obesity     Pain     right  shoulder/ torn rotator cuff    PONV (postoperative nausea and vomiting)     Psychiatric disorder     Anxiety  not taking any medications    Skin cancer     skin    Sleep apnea     uses cpap    Snoring        FAMILY HISTORY  Family History   Problem Relation Age of Onset    Cancer Mother         Breast cancer    Cancer Sister         Breast cancer    Cancer Other     Cancer Paternal Grandfather         liver    Cancer Father         Pancreatic cancer    Cancer Maternal Aunt     Heart Disease Neg Hx        SOCIAL HISTORY  Social History     Tobacco Use    Smoking status: Never    Smokeless tobacco: Never   Vaping Use    Vaping Use: Never used   Substance Use Topics    Alcohol use: No     Alcohol/week: 0.0 oz    Drug use: No     Social History     Substance and Sexual Activity   Drug Use No       SURGICAL HISTORY  Past Surgical History:   Procedure Laterality Date    PB RECONSTR TOTAL SHOULDER IMPLANT Right 8/14/2019    Procedure: ARTHROPLASTY, SHOULDER, TOTAL- REVERSE;  Surgeon: Parish Garcia M.D.;  Location: SURGERY [facility];  Service: Orthopedics    CARPAL TUNNEL ENDOSCOPIC  10/28/2011    Performed by EDGAR GARCIA at SURGERY SAME DAY [facility]    CARPAL TUNNEL ENDOSCOPIC  10/17/2011    Performed by EDGAR GARCAI at SURGERY SAME DAY [facility]    HYSTEROSCOPY WITH VIDEO DIAGNOSTIC  10/7/08    Performed by MICHELINE WEBER at SURGERY SAME DAY [facility]    DILATION AND CURETTAGE  10/7/08    Performed by MICHELINE WEBER at SURGERY SAME DAY [facility]    LUMPECTOMY Right 2007    Breast lump removed    CARPAL TUNNEL RELEASE      OTHER      hemorrhoid banding    OTHER CARDIAC SURGERY      Cath - 4-5 years ago with shantellllilya    TONSILLECTOMY         CURRENT MEDICATIONS    Current Facility-Administered Medications:     bupivacaine 0.25% (SENSORCAINE-MARCAINE) pf injection 20 mL, 20 mL, Injection, Once, Gabriele Condon M.D.    triamcinolone acetonide (KENALOG-40)  "injection 40 mg, 40 mg, Other, Once, Gabriele Condon M.D.    Current Outpatient Medications:     doxycycline (MONODOX) 100 MG capsule, Take 1 Capsule by mouth 2 times a day., Disp: 14 Capsule, Rfl: 0    albuterol 108 (90 Base) MCG/ACT Aero Soln inhalation aerosol, Inhale 2 Puffs every 6 hours as needed (cough/wheeze)., Disp: 8.5 g, Rfl: 3    hydroCHLOROthiazide (HYDRODIURIL) 25 MG Tab, TAKE 1 TABLET EVERY EVENING, Disp: 90 Tablet, Rfl: 0    levothyroxine (SYNTHROID) 75 MCG Tab, TAKE 1 TABLET EVERY DAY, Disp: 90 Tablet, Rfl: 0    metoprolol SR (TOPROL XL) 50 MG TABLET SR 24 HR, TAKE 1 TABLET EVERY DAY, Disp: 90 Tablet, Rfl: 0    lisinopril (PRINIVIL) 40 MG tablet, TAKE 1 TABLET EVERY DAY, Disp: 90 Tablet, Rfl: 0    rosuvastatin (CRESTOR) 10 MG Tab, TAKE 1 TABLET EVERY EVENING, Disp: 90 Tablet, Rfl: 0    acetaminophen (TYLENOL) 500 MG Tab, Take 500-1,000 mg by mouth every 6 hours as needed., Disp: , Rfl:     CALCIUM PO, Take 2 Tabs by mouth every evening., Disp: , Rfl:     Cholecalciferol (VITAMIN D) 2000 UNIT Tab, Take 2,000 Units by mouth every evening., Disp: , Rfl:     polyethylene glycol 3350 (MIRALAX) 17 GM/SCOOP Powder, Take 17 g by mouth every day., Disp: , Rfl:     multivitamin (THERAGRAN) Tab, Take 2 Tabs by mouth every evening., Disp: , Rfl:     Fiber Powder, Take 1 Each by mouth every day., Disp: , Rfl:     ALLERGIES  Allergies   Allergen Reactions    Other Drug Hives     Vicodin     Aleve [Gnp Naproxen Sodium]      hives    Latex Rash    Naproxen Sodium Hives    Other Misc Swelling     Some soap & toothpaste puffs, colored laundry soap dryer sheets    Skelaxin [Metaxalone] Hives    Sunscreens Hives       PHYSICAL EXAM  VITAL SIGNS: /72   Pulse 65   Temp 36.4 °C (97.6 °F) (Temporal)   Resp 16   Ht 1.575 m (5' 2\")   Wt 92.6 kg (204 lb 2.3 oz)   SpO2 99%   BMI 37.34 kg/m²   Reviewed and noted.  Constitutional: Well developed, Well nourished, acute distress strong pedal pulse extremities " warm to touch..  HENT: Normocephalic, atraumatic,   Eyes: PERRLA, conjunctiva pink, no scleral icterus.   Cardiovascular: Regular rate and rhythm. No murmurs, rubs or gallops.  No dependent edema or calf tenderness of Homans negative cords tenderness located in the  Respiratory: No respiratory distress no stridor  Abdominal:  Abdomen soft, non-tender, non distended. No rebound, or guarding.    Skin: No erythema, no rash. No wounds or bruising.  Genitourinary: No costovertebral angle tenderness.   Musculoskeletal: Superior and anys on the superior iliac crest.  Also tenderness of the iliotibial band no tenderness over the greater trochanter.  She has good range of motion of the hip and knee.  Neurologic: Alert & oriented x 3, cranial nerves 2-12 intact by passive exam.  Moves 4 limbs with symmetric strength.  Psychiatric: Affect normal, Judgment normal, Mood normal.       RADIOLOGY  DX-FEMUR-2+ LEFT   Final Result            No acute osseous abnormality.      DX-PELVIS-1 OR 2 VIEWS   Final Result         1. No acute osseous abnormality.            ED COURSE:    Received x-rays.  X-ray showed no fracture  Patient of procedure of trigger point injections    Trigger Point Injection Procedure Note    Indication: Severe pain    Procedure: The patient was placed in the appropriate position and the area over the trigger point was prepped with chlorhexidine. Injection was performed into the trigger point area using 4.5 cc of lidocaine with 0.5 cc of Kenalog (triamcinalone 40mg/ml). The injection site was covered with a bandage.    The patient tolerated the procedure well.    Complications: None    3 injections were done 1 over the posterior superior iliac crest and 2 on the iliotibial band with significant relief.    INTERVENTIONS BY ME:  Medications   bupivacaine 0.25% (SENSORCAINE-MARCAINE) pf injection 20 mL (has no administration in time range)   triamcinolone acetonide (KENALOG-40) injection 40 mg (has no administration  in time range)       Response on recheck: Feels better..    2pm- Patient reassessed and is feeling better.    I    MEDICAL DECISION MAKING:  PROBLEMS EVALUATED THIS VISIT:  Iliotibial band syndrome and also pain over the iliac crest.  Most likely musculoskeletal no clinical signs of infection dissection or other abnormality.  Patient agreed to trigger point injections including the risks alternative benefits include infection abscess formation.  Pain.    Tolerated procedure well and is doing well.  No medications to be given.  Follow-up with her doctor.     PLAN:  New Prescriptions    No medications on file         Return fo pain fever chills    Followup:  Akshat Reaves M.D.  [address] [place] [address] [address] [place] [address]  [phone]    Go in 1 day        CONDITION: Improved    FINAL IMPRESSION  1. Iliotibial band syndrome of left side

## 2023-02-13 NOTE — DISCHARGE INSTRUCTIONS
See your doctor tomorrow as directed  Do those stretches as we discussed  Return if symptoms worsen  You can put heat on it 2-3 times a day to increase blood flow and hopefully reduce swelling.

## 2023-02-14 ENCOUNTER — OFFICE VISIT (OUTPATIENT)
Dept: MEDICAL GROUP | Age: 73
End: 2023-02-14
Payer: MEDICARE

## 2023-02-14 VITALS
HEART RATE: 61 BPM | TEMPERATURE: 97.6 F | RESPIRATION RATE: 16 BRPM | DIASTOLIC BLOOD PRESSURE: 70 MMHG | BODY MASS INDEX: 36.91 KG/M2 | HEIGHT: 62 IN | WEIGHT: 200.6 LBS | SYSTOLIC BLOOD PRESSURE: 130 MMHG | OXYGEN SATURATION: 98 %

## 2023-02-14 DIAGNOSIS — D64.9 NORMOCYTIC ANEMIA: ICD-10-CM

## 2023-02-14 DIAGNOSIS — I10 PRIMARY HYPERTENSION: ICD-10-CM

## 2023-02-14 DIAGNOSIS — E78.5 DYSLIPIDEMIA: ICD-10-CM

## 2023-02-14 DIAGNOSIS — M76.32 ILIOTIBIAL BAND SYNDROME, LEFT: ICD-10-CM

## 2023-02-14 PROCEDURE — 99214 OFFICE O/P EST MOD 30 MIN: CPT | Performed by: FAMILY MEDICINE

## 2023-02-14 RX ORDER — TRAMADOL HYDROCHLORIDE 50 MG/1
50 TABLET ORAL
Qty: 30 TABLET | Refills: 0 | Status: SHIPPED | OUTPATIENT
Start: 2023-02-14 | End: 2023-03-16

## 2023-02-14 ASSESSMENT — FIBROSIS 4 INDEX: FIB4 SCORE: 1.13

## 2023-02-15 NOTE — PROGRESS NOTES
Subjective:   CC: Acute left hip pain.    HPI:     Prince Mazariegos is a 72 y.o. female, established patient of the clinic.  Patient has chronic hypertension, hyperlipidemia, obesity, hypothyroidism, GERD, CKD stage IIIa.  Patient was in her normal state of health until a few days ago when she developed acute left hip pain without history of trauma.  Patient presented to ER on 2/13/2023 for her symptoms.  X-ray of the left hip and pelvis was negative for acute findings.  Patient was diagnosed with iliotibial band syndrome and received a steroid injection by ER physician.  Today, patient states that her symptoms are 50% improved.  She is take Tylenol 500 mg a few times per day for pain control.  She is unable to take NSAIDs due to history of allergic reaction to NSAIDs.  Patient has been doing rehab exercises as instructed by ER physician.  She is not interested in referral to physical therapy.      Current medicines (including changes today)  Current Outpatient Medications   Medication Sig Dispense Refill    traMADol (ULTRAM) 50 MG Tab Take 1 Tablet by mouth 1 time a day as needed for Severe Pain or Moderate Pain for up to 30 days. 30 Tablet 0    albuterol 108 (90 Base) MCG/ACT Aero Soln inhalation aerosol Inhale 2 Puffs every 6 hours as needed (cough/wheeze). 8.5 g 3    hydroCHLOROthiazide (HYDRODIURIL) 25 MG Tab TAKE 1 TABLET EVERY EVENING 90 Tablet 0    levothyroxine (SYNTHROID) 75 MCG Tab TAKE 1 TABLET EVERY DAY 90 Tablet 0    metoprolol SR (TOPROL XL) 50 MG TABLET SR 24 HR TAKE 1 TABLET EVERY DAY 90 Tablet 0    lisinopril (PRINIVIL) 40 MG tablet TAKE 1 TABLET EVERY DAY 90 Tablet 0    rosuvastatin (CRESTOR) 10 MG Tab TAKE 1 TABLET EVERY EVENING 90 Tablet 0    acetaminophen (TYLENOL) 500 MG Tab Take 500-1,000 mg by mouth every 6 hours as needed.      CALCIUM PO Take 2 Tabs by mouth every evening.      Cholecalciferol (VITAMIN D) 2000 UNIT Tab Take 2,000 Units by mouth every evening.      polyethylene glycol  "3350 (MIRALAX) 17 GM/SCOOP Powder Take 17 g by mouth every day.      multivitamin (THERAGRAN) Tab Take 2 Tabs by mouth every evening.      Fiber Powder Take 1 Each by mouth every day.       No current facility-administered medications for this visit.     She  has a past medical history of Anesthesia, Arrhythmia, Arthritis, Back pain, Breath shortness, Chest pain (May 2016), Chickenpox, DDD (degenerative disc disease), cervical, Disorder of thyroid, Enlarged tonsils, GERD (gastroesophageal reflux disease), Hungarian measles, Hemorrhoids, High cholesterol, Hypertension, Obesity, Pain, PONV (postoperative nausea and vomiting), Psychiatric disorder, Skin cancer, Sleep apnea, and Snoring.    I reviewed patient's problem list, allergies, medications, family hx, social hx with patient and update EPIC.        Objective:     /70 (BP Location: Right arm, Patient Position: Sitting, BP Cuff Size: Adult long)   Pulse 61   Temp 36.4 °C (97.6 °F)   Resp 16   Ht 1.575 m (5' 2\")   Wt 91 kg (200 lb 9.6 oz)   SpO2 98%  Body mass index is 36.69 kg/m².    Physical Exam:  Constitutional: awake, alert, in no distress.  Skin: Warm, dry, good turgor, no rashes, bruises, ulcers in visible areas.  Eye: conjunctiva clear, lids neg for edema or lesions.  Neck: Trachea midline, no masses, no thyromegaly. No cervical or supraclavicular lymphadenopathy  Respiratory: Unlabored respiratory effort, lungs clear to auscultation, no wheezes, no rales.  Cardiovascular: Normal S1, S2, no murmur, no pedal edema.  Psych: Oriented x3, affect and mood wnl, intact judgement and insight.       Assessment and Plan:   The following treatment plan was discussed    1. Iliotibial band syndrome, left  Acute left iliotibial band syndrome, status post steroid injection a few days ago by ER physician.  Her symptoms are improving approximately 50%.  Patient has been taking Tylenol 500 mg a few times per day for pain.  Pain is particularly bothersome with " activity.  Patient is not able to take NSAIDs due to history of allergic reaction to NSAIDs.   -Continue Tylenol 500 mg every 6 hours as needed  - traMADol (ULTRAM) 50 MG Tab; Take 1 Tablet by mouth 1 time a day as needed for Severe Pain or Moderate Pain for up to 30 days.  Dispense: 30 Tablet; Refill: 0  - Consent for Opiate Prescription  -Patient refused physical therapy.  She has been doing stretching exercises provided by ER physician.  -Weight loss, activity modification, follow-up with PCP in 3 months for reassessment.      2. Normocytic anemia  ER labs was notable for mild normocytic anemia.  Patient is asymptomatic.  Negative history of active bleeding.  Recent thyroid blood test was negative.  Last colonoscopy was in May 2019 which was normal except for internal hemorrhoids and mild diverticulosis.  - CBC WITH DIFFERENTIAL; Future  - Comp Metabolic Panel; Future  - FERRITIN; Future  - IRON/TOTAL IRON BIND; Future  - VITAMIN B12; Future  - FOLATE; Future  - OCCULT BLOOD STOOL; Future    3. Primary hypertension  Chronic, controlled with lisinopril 40 mg and hydrochlorothiazide 25 mg daily, no s/e reported, will continue.      4. Dyslipidemia  Chronic, controlled with Crestor 10 mg daily, no s/e reported, will continue.       Pat Ruano M.D.      Followup: Return for follow up with PCP as directed.    Please note that this dictation was created using voice recognition software. I have made every reasonable attempt to correct obvious errors, but I expect that there are errors of grammar and possibly content that I did not discover before finalizing the note.

## 2023-04-01 SDOH — HEALTH STABILITY: PHYSICAL HEALTH: ON AVERAGE, HOW MANY MINUTES DO YOU ENGAGE IN EXERCISE AT THIS LEVEL?: 80 MIN

## 2023-04-01 SDOH — ECONOMIC STABILITY: FOOD INSECURITY: WITHIN THE PAST 12 MONTHS, THE FOOD YOU BOUGHT JUST DIDN'T LAST AND YOU DIDN'T HAVE MONEY TO GET MORE.: NEVER TRUE

## 2023-04-01 SDOH — ECONOMIC STABILITY: HOUSING INSECURITY: IN THE LAST 12 MONTHS, HOW MANY PLACES HAVE YOU LIVED?: 1

## 2023-04-01 SDOH — HEALTH STABILITY: PHYSICAL HEALTH: ON AVERAGE, HOW MANY DAYS PER WEEK DO YOU ENGAGE IN MODERATE TO STRENUOUS EXERCISE (LIKE A BRISK WALK)?: 7 DAYS

## 2023-04-01 SDOH — ECONOMIC STABILITY: FOOD INSECURITY: WITHIN THE PAST 12 MONTHS, YOU WORRIED THAT YOUR FOOD WOULD RUN OUT BEFORE YOU GOT MONEY TO BUY MORE.: NEVER TRUE

## 2023-04-01 SDOH — ECONOMIC STABILITY: INCOME INSECURITY: IN THE LAST 12 MONTHS, WAS THERE A TIME WHEN YOU WERE NOT ABLE TO PAY THE MORTGAGE OR RENT ON TIME?: NO

## 2023-04-01 SDOH — ECONOMIC STABILITY: TRANSPORTATION INSECURITY
IN THE PAST 12 MONTHS, HAS LACK OF TRANSPORTATION KEPT YOU FROM MEETINGS, WORK, OR FROM GETTING THINGS NEEDED FOR DAILY LIVING?: NO

## 2023-04-01 ASSESSMENT — SOCIAL DETERMINANTS OF HEALTH (SDOH)
WITHIN THE PAST 12 MONTHS, YOU WORRIED THAT YOUR FOOD WOULD RUN OUT BEFORE YOU GOT THE MONEY TO BUY MORE: NEVER TRUE
HOW OFTEN DO YOU ATTENT MEETINGS OF THE CLUB OR ORGANIZATION YOU BELONG TO?: NEVER
HOW OFTEN DO YOU ATTEND CHURCH OR RELIGIOUS SERVICES?: NEVER
HOW OFTEN DO YOU ATTENT MEETINGS OF THE CLUB OR ORGANIZATION YOU BELONG TO?: NEVER
HOW MANY DRINKS CONTAINING ALCOHOL DO YOU HAVE ON A TYPICAL DAY WHEN YOU ARE DRINKING: PATIENT DOES NOT DRINK
DO YOU BELONG TO ANY CLUBS OR ORGANIZATIONS SUCH AS CHURCH GROUPS UNIONS, FRATERNAL OR ATHLETIC GROUPS, OR SCHOOL GROUPS?: NO
HOW OFTEN DO YOU ATTEND CHURCH OR RELIGIOUS SERVICES?: NEVER
DO YOU BELONG TO ANY CLUBS OR ORGANIZATIONS SUCH AS CHURCH GROUPS UNIONS, FRATERNAL OR ATHLETIC GROUPS, OR SCHOOL GROUPS?: NO
IN A TYPICAL WEEK, HOW MANY TIMES DO YOU TALK ON THE PHONE WITH FAMILY, FRIENDS, OR NEIGHBORS?: MORE THAN THREE TIMES A WEEK
IN A TYPICAL WEEK, HOW MANY TIMES DO YOU TALK ON THE PHONE WITH FAMILY, FRIENDS, OR NEIGHBORS?: MORE THAN THREE TIMES A WEEK
HOW OFTEN DO YOU HAVE A DRINK CONTAINING ALCOHOL: NEVER
HOW OFTEN DO YOU GET TOGETHER WITH FRIENDS OR RELATIVES?: TWICE A WEEK
ARE YOU MARRIED, WIDOWED, DIVORCED, SEPARATED, NEVER MARRIED, OR LIVING WITH A PARTNER?: NEVER MARRIED
HOW OFTEN DO YOU GET TOGETHER WITH FRIENDS OR RELATIVES?: TWICE A WEEK
ARE YOU MARRIED, WIDOWED, DIVORCED, SEPARATED, NEVER MARRIED, OR LIVING WITH A PARTNER?: NEVER MARRIED

## 2023-04-01 ASSESSMENT — LIFESTYLE VARIABLES
HOW MANY STANDARD DRINKS CONTAINING ALCOHOL DO YOU HAVE ON A TYPICAL DAY: PATIENT DOES NOT DRINK
HOW OFTEN DO YOU HAVE A DRINK CONTAINING ALCOHOL: NEVER

## 2023-04-04 ENCOUNTER — OFFICE VISIT (OUTPATIENT)
Dept: MEDICAL GROUP | Facility: MEDICAL CENTER | Age: 73
End: 2023-04-04
Payer: MEDICARE

## 2023-04-04 VITALS
HEART RATE: 78 BPM | HEIGHT: 62 IN | WEIGHT: 199 LBS | TEMPERATURE: 97.2 F | SYSTOLIC BLOOD PRESSURE: 122 MMHG | OXYGEN SATURATION: 96 % | DIASTOLIC BLOOD PRESSURE: 62 MMHG | BODY MASS INDEX: 36.62 KG/M2

## 2023-04-04 DIAGNOSIS — M54.17 LUMBOSACRAL RADICULOPATHY: ICD-10-CM

## 2023-04-04 DIAGNOSIS — D64.9 NORMOCYTIC ANEMIA: ICD-10-CM

## 2023-04-04 DIAGNOSIS — E03.4 HYPOTHYROIDISM DUE TO ACQUIRED ATROPHY OF THYROID: ICD-10-CM

## 2023-04-04 DIAGNOSIS — R41.3 MEMORY CHANGE: ICD-10-CM

## 2023-04-04 DIAGNOSIS — N18.31 CHRONIC KIDNEY DISEASE, STAGE 3A: ICD-10-CM

## 2023-04-04 DIAGNOSIS — E78.5 DYSLIPIDEMIA: ICD-10-CM

## 2023-04-04 DIAGNOSIS — I47.10 SVT (SUPRAVENTRICULAR TACHYCARDIA) (HCC): ICD-10-CM

## 2023-04-04 DIAGNOSIS — Z76.89 ENCOUNTER TO ESTABLISH CARE: ICD-10-CM

## 2023-04-04 DIAGNOSIS — I10 ESSENTIAL HYPERTENSION: ICD-10-CM

## 2023-04-04 PROCEDURE — 99214 OFFICE O/P EST MOD 30 MIN: CPT | Performed by: NURSE PRACTITIONER

## 2023-04-04 RX ORDER — ROSUVASTATIN CALCIUM 10 MG/1
10 TABLET, COATED ORAL EVERY EVENING
Qty: 90 TABLET | Refills: 3 | Status: SHIPPED | OUTPATIENT
Start: 2023-04-04

## 2023-04-04 RX ORDER — LISINOPRIL 40 MG/1
40 TABLET ORAL
Qty: 90 TABLET | Refills: 3 | Status: SHIPPED | OUTPATIENT
Start: 2023-04-04

## 2023-04-04 RX ORDER — LEVOTHYROXINE SODIUM 0.07 MG/1
75 TABLET ORAL
Qty: 90 TABLET | Refills: 3 | Status: SHIPPED | OUTPATIENT
Start: 2023-04-04

## 2023-04-04 RX ORDER — HYDROCHLOROTHIAZIDE 25 MG/1
25 TABLET ORAL EVERY EVENING
Qty: 90 TABLET | Refills: 3 | Status: SHIPPED | OUTPATIENT
Start: 2023-04-04

## 2023-04-04 RX ORDER — METOPROLOL SUCCINATE 50 MG/1
50 TABLET, EXTENDED RELEASE ORAL
Qty: 90 TABLET | Refills: 3 | Status: SHIPPED | OUTPATIENT
Start: 2023-04-04

## 2023-04-04 ASSESSMENT — FIBROSIS 4 INDEX: FIB4 SCORE: 1.13

## 2023-04-04 NOTE — PROGRESS NOTES
Chief Complaint   Patient presents with    Establish Care     Prior PCP Akshat Reaves M.D.    Leg Pain     Bilat x 3 mths for the L side, R side has been over a yr    Dyslipidemia     Discuss Crestor      Prince Mazariegos is a 72 y.o. female here to establish care and to discuss the evaluation and management of:      Patient reports having bilateral leg pain, right leg worse than left leg.  She is pointing to her upper thighs.  She tells me this been ongoing for about 1 year on the right side and for about 3 months on the left..  Denies any loss of bowel or bladder, denies any saddle anesthesia.  After further discussion patient states that she is currently being followed by Dr. Escudero again.  She also has an upcoming EMG.  She also has had an epidural in her back in the past.  Denies any history of any back surgeries.  She is been doing some PT exercises.  She is quite frustrated by this.    Svt (Supraventricular Tachycardia) (Hcc)    Chronic. Stable on Metoprolol for this. Symptoms stable. Followed by Cardiology.      Chronic Kidney Disease, Stage 3a (Hcc)  Chronic, stable.  Avoids NSAIDs.   Htn (Hypertension)    Chronic. Stable on Lisinopril 40mg, HCTZ 25mg.      Hypothyroid    Chronic. Stable on Levothyroxine 75mcg.        Chart reviewed    ROS: SEE ABOVE        Current Outpatient Medications:     Calcium-Vitamin D-Vitamin K (VIACTIV PO), Take  by mouth., Disp: , Rfl:     lisinopril (PRINIVIL) 40 MG tablet, Take 1 Tablet by mouth every day., Disp: 90 Tablet, Rfl: 3    hydroCHLOROthiazide (HYDRODIURIL) 25 MG Tab, Take 1 Tablet by mouth every evening., Disp: 90 Tablet, Rfl: 3    levothyroxine (SYNTHROID) 75 MCG Tab, Take 1 Tablet by mouth every day., Disp: 90 Tablet, Rfl: 3    metoprolol SR (TOPROL XL) 50 MG TABLET SR 24 HR, Take 1 Tablet by mouth every day., Disp: 90 Tablet, Rfl: 3    rosuvastatin (CRESTOR) 10 MG Tab, Take 1 Tablet by mouth every evening., Disp: 90 Tablet, Rfl: 3    albuterol 108 (90 Base)  MCG/ACT Aero Soln inhalation aerosol, Inhale 2 Puffs every 6 hours as needed (cough/wheeze)., Disp: 8.5 g, Rfl: 3    acetaminophen (TYLENOL) 500 MG Tab, Take 500-1,000 mg by mouth every 6 hours as needed., Disp: , Rfl:     Cholecalciferol (VITAMIN D) 2000 UNIT Tab, Take 2,000 Units by mouth every evening., Disp: , Rfl:     polyethylene glycol 3350 (MIRALAX) 17 GM/SCOOP Powder, Take 17 g by mouth every day., Disp: , Rfl:     multivitamin (THERAGRAN) Tab, Take 2 Tabs by mouth every evening., Disp: , Rfl:     Fiber Powder, Take 1 Each by mouth every day., Disp: , Rfl:     Allergies   Allergen Reactions    Hydrocodone Hives    Advil [Ibuprofen] Hives    Celebrex [Celecoxib]      Affects kidney Function    Latex Rash    Naproxen Sodium Hives    Other Misc Swelling     Some soap & toothpaste puffs, colored laundry soap dryer sheets    Skelaxin [Metaxalone] Hives    Sunscreens Hives       Past Medical History:   Diagnosis Date    Anesthesia     PONV    Arrhythmia     Arthritis     osteo neck right knee back right shoulder    Back pain     Breath shortness     related to pain    Chest pain May 2016    PET scan with no infarct or ischemia, normal LVEF.    Chickenpox     DDD (degenerative disc disease), cervical     Disorder of thyroid     Enlarged tonsils     Removed with adenoids    GERD (gastroesophageal reflux disease)     Yoruba measles     Hemorrhoids     bleeding    High cholesterol     Hypertension     pt states well controlled on meds    Obesity     Pain     right shoulder/ torn rotator cuff    PONV (postoperative nausea and vomiting)     Psychiatric disorder     Anxiety  not taking any medications    Skin cancer     skin    Sleep apnea     uses cpap    Snoring      Past Surgical History:   Procedure Laterality Date    PB RECONSTR TOTAL SHOULDER IMPLANT Right 8/14/2019    Procedure: ARTHROPLASTY, SHOULDER, TOTAL- REVERSE;  Surgeon: Parish Maria M.D.;  Location: SURGERY [facility];  Service: Orthopedics     CARPAL TUNNEL ENDOSCOPIC  10/28/2011    Performed by EDGAR GARCIA at [facility]    CARPAL TUNNEL ENDOSCOPIC  10/17/2011    Performed by EDGAR GARCIA at [facility]    HYSTEROSCOPY WITH VIDEO DIAGNOSTIC  10/7/08    Performed by MICHELINE WEBER at [facility]    DILATION AND CURETTAGE  10/7/08    Performed by MICHELINE WEBER at [facility]    LUMPECTOMY Right 2007    Breast lump removed    CARPAL TUNNEL RELEASE      OTHER      hemorrhoid banding    OTHER CARDIAC SURGERY      Cath - 4-5 years ago with challapalli    TONSILLECTOMY       Family History   Problem Relation Age of Onset    Cancer Mother         Breast cancer    Cancer Sister         Breast cancer    Cancer Other     Cancer Paternal Grandfather         liver    Cancer Father         Pancreatic cancer    Cancer Maternal Aunt     Heart Disease Neg Hx      Social History     Socioeconomic History    Marital status: Single     Spouse name: Not on file    Number of children: Not on file    Years of education: Not on file    Highest education level: Master's degree (e.g., MA, MS, Daniele, MEd, MSW, LEE)   Occupational History    Not on file   Tobacco Use    Smoking status: Never    Smokeless tobacco: Never   Vaping Use    Vaping Use: Never used   Substance and Sexual Activity    Alcohol use: No     Alcohol/week: 0.0 oz    Drug use: Yes     Comment: CBD Cream Only    Sexual activity: Not on file   Other Topics Concern    Not on file   Social History Narrative    Not on file     Social Determinants of Health     Financial Resource Strain: Not on file   Food Insecurity: No Food Insecurity    Worried About Running Out of Food in the Last Year: Never true    Ran Out of Food in the Last Year: Never true   Transportation Needs: No Transportation Needs    Lack of Transportation (Medical): No    Lack of Transportation (Non-Medical): No   Physical Activity: Sufficiently Active     "Days of Exercise per Week: 7 days    Minutes of Exercise per Session: 80 min   Stress: No Stress Concern Present    Feeling of Stress : Not at all   Social Connections: Socially Isolated    Frequency of Communication with Friends and Family: More than three times a week    Frequency of Social Gatherings with Friends and Family: Twice a week    Attends Yazdanism Services: Never    Active Member of Clubs or Organizations: No    Attends Club or Organization Meetings: Never    Marital Status: Never    Intimate Partner Violence: Not on file   Housing Stability: Low Risk     Unable to Pay for Housing in the Last Year: No    Number of Places Lived in the Last Year: 1    Unstable Housing in the Last Year: No       Objective:     Vitals: /62 (BP Location: Right arm, Patient Position: Sitting, BP Cuff Size: Adult)   Pulse 78   Temp 36.2 °C (97.2 °F) (Temporal)   Ht 1.575 m (5' 2\")   Wt 90.3 kg (199 lb)   SpO2 96%   BMI 36.40 kg/m²      General: Alert, pleasant, NAD  HEENT:  Normocephalic.  Neck supple.  No thyromegaly or masses palpated. No cervical or supraclavicular lymphadenopathy.  Heart:  Regular rate and rhythm.  S1 and S2 normal.  No murmurs appreciated.    Respiratory:  Normal respiratory effort.  Clear to auscultation bilaterally.    Skin:  Warm, dry, no rashes  Musculoskeletal:  Gait is normal.  Moves all extremities well.  Extremities:   No leg edema.  Neurological: No tremors  Psych:  Affect/mood is normal, judgement is good, memory is intact, grooming is appropriate.    Assessment and Plan.     72 y.o. female to establish care and discuss the followin. Essential hypertension  Stable. Will continue HCTZ, lisinopril. Discussed heart healthy diet. Encouraged to avoid high sodium foods.  Refills provided.  - lisinopril (PRINIVIL) 40 MG tablet; Take 1 Tablet by mouth every day.  Dispense: 90 Tablet; Refill: 3  - hydroCHLOROthiazide (HYDRODIURIL) 25 MG Tab; Take 1 Tablet by mouth every " evening.  Dispense: 90 Tablet; Refill: 3  - metoprolol SR (TOPROL XL) 50 MG TABLET SR 24 HR; Take 1 Tablet by mouth every day.  Dispense: 90 Tablet; Refill: 3    2. Hypothyroidism due to acquired atrophy of thyroid  Chronic, stable.  Continue levothyroxine 75 mcg daily.  Refills provided.  - levothyroxine (SYNTHROID) 75 MCG Tab; Take 1 Tablet by mouth every day.  Dispense: 90 Tablet; Refill: 3    3. Chronic kidney disease, stage 3a (HCC)  Stable.  Continue routine lab work, avoid NSAIDs.  - Basic Metabolic Panel; Future    4. SVT (supraventricular tachycardia) (HCC)  Stable.  Continue metoprolol.    5. Normocytic anemia  New finding on recent blood work.  Recommend updating labs and trend  - CBC WITHOUT DIFFERENTIAL; Future  - FERRITIN; Future  - IRON/TOTAL IRON BIND; Future  - FOLATE; Future  - OCCULT BLOOD STOOL; Future  - VITAMIN B12; Future    6. Lumbosacral radiculopathy  Chronic problem, not well controlled this time.  Pending EMG and follow-up with Dr. Larsen.    7. Dyslipidemia  Chronic, stable.  Continue rosuvastatin 10 mg.  Refills provided.  - rosuvastatin (CRESTOR) 10 MG Tab; Take 1 Tablet by mouth every evening.  Dispense: 90 Tablet; Refill: 3    8. Memory change  Mentioned at the end of the visit-occasionally forgetting some words.  Complete MMSE at next office visit and review labs.    9. Encounter to establish care      Follow-up to review blood work, and after consultation with neurosurgery.        Return in about 4 weeks (around 5/2/2023) for Lab results.          Cony CORONA

## 2023-04-05 ENCOUNTER — TELEPHONE (OUTPATIENT)
Dept: HEALTH INFORMATION MANAGEMENT | Facility: OTHER | Age: 73
End: 2023-04-05
Payer: MEDICARE

## 2023-04-05 PROBLEM — I47.10 SVT (SUPRAVENTRICULAR TACHYCARDIA) (HCC): Chronic | Status: ACTIVE | Noted: 2020-01-02

## 2023-04-05 PROBLEM — M54.17 LUMBOSACRAL RADICULOPATHY: Status: ACTIVE | Noted: 2023-04-05

## 2023-04-05 PROBLEM — D64.9 NORMOCYTIC ANEMIA: Status: ACTIVE | Noted: 2023-04-05

## 2023-04-27 ENCOUNTER — HOSPITAL ENCOUNTER (OUTPATIENT)
Dept: LAB | Facility: MEDICAL CENTER | Age: 73
End: 2023-04-27
Attending: NURSE PRACTITIONER
Payer: MEDICARE

## 2023-04-27 DIAGNOSIS — N18.31 CHRONIC KIDNEY DISEASE, STAGE 3A: ICD-10-CM

## 2023-04-27 DIAGNOSIS — D64.9 NORMOCYTIC ANEMIA: ICD-10-CM

## 2023-04-27 LAB
ANION GAP SERPL CALC-SCNC: 14 MMOL/L (ref 7–16)
BUN SERPL-MCNC: 26 MG/DL (ref 8–22)
CALCIUM SERPL-MCNC: 9.8 MG/DL (ref 8.5–10.5)
CHLORIDE SERPL-SCNC: 102 MMOL/L (ref 96–112)
CO2 SERPL-SCNC: 22 MMOL/L (ref 20–33)
CREAT SERPL-MCNC: 1.17 MG/DL (ref 0.5–1.4)
ERYTHROCYTE [DISTWIDTH] IN BLOOD BY AUTOMATED COUNT: 42.6 FL (ref 35.9–50)
FERRITIN SERPL-MCNC: 678 NG/ML (ref 10–291)
FOLATE SERPL-MCNC: 18.7 NG/ML
GFR SERPLBLD CREATININE-BSD FMLA CKD-EPI: 50 ML/MIN/1.73 M 2
GLUCOSE SERPL-MCNC: 113 MG/DL (ref 65–99)
HCT VFR BLD AUTO: 38 % (ref 37–47)
HEMOCCULT STL QL: NEGATIVE
HGB BLD-MCNC: 12.4 G/DL (ref 12–16)
IRON SATN MFR SERPL: 46 % (ref 15–55)
IRON SERPL-MCNC: 127 UG/DL (ref 40–170)
MCH RBC QN AUTO: 30.2 PG (ref 27–33)
MCHC RBC AUTO-ENTMCNC: 32.6 G/DL (ref 33.6–35)
MCV RBC AUTO: 92.7 FL (ref 81.4–97.8)
PLATELET # BLD AUTO: 278 K/UL (ref 164–446)
PMV BLD AUTO: 9.9 FL (ref 9–12.9)
POTASSIUM SERPL-SCNC: 4 MMOL/L (ref 3.6–5.5)
RBC # BLD AUTO: 4.1 M/UL (ref 4.2–5.4)
SODIUM SERPL-SCNC: 138 MMOL/L (ref 135–145)
TIBC SERPL-MCNC: 275 UG/DL (ref 250–450)
UIBC SERPL-MCNC: 148 UG/DL (ref 110–370)
VIT B12 SERPL-MCNC: 375 PG/ML (ref 211–911)
WBC # BLD AUTO: 6.9 K/UL (ref 4.8–10.8)

## 2023-04-27 PROCEDURE — 83540 ASSAY OF IRON: CPT

## 2023-04-27 PROCEDURE — 80048 BASIC METABOLIC PNL TOTAL CA: CPT

## 2023-04-27 PROCEDURE — 85027 COMPLETE CBC AUTOMATED: CPT

## 2023-04-27 PROCEDURE — 36415 COLL VENOUS BLD VENIPUNCTURE: CPT

## 2023-04-27 PROCEDURE — 82728 ASSAY OF FERRITIN: CPT

## 2023-04-27 PROCEDURE — 82746 ASSAY OF FOLIC ACID SERUM: CPT

## 2023-04-27 PROCEDURE — 83550 IRON BINDING TEST: CPT

## 2023-04-27 PROCEDURE — 82607 VITAMIN B-12: CPT

## 2023-04-27 PROCEDURE — 82272 OCCULT BLD FECES 1-3 TESTS: CPT

## 2023-05-02 ENCOUNTER — OFFICE VISIT (OUTPATIENT)
Dept: MEDICAL GROUP | Facility: MEDICAL CENTER | Age: 73
End: 2023-05-02
Payer: MEDICARE

## 2023-05-02 VITALS
TEMPERATURE: 97.1 F | DIASTOLIC BLOOD PRESSURE: 58 MMHG | WEIGHT: 197 LBS | OXYGEN SATURATION: 99 % | SYSTOLIC BLOOD PRESSURE: 116 MMHG | HEIGHT: 62 IN | HEART RATE: 79 BPM | BODY MASS INDEX: 36.25 KG/M2

## 2023-05-02 DIAGNOSIS — D64.9 NORMOCYTIC ANEMIA: ICD-10-CM

## 2023-05-02 DIAGNOSIS — N18.31 CHRONIC KIDNEY DISEASE, STAGE 3A: Chronic | ICD-10-CM

## 2023-05-02 DIAGNOSIS — L98.9 SKIN LESION: ICD-10-CM

## 2023-05-02 DIAGNOSIS — G31.84 MILD COGNITIVE IMPAIRMENT: ICD-10-CM

## 2023-05-02 DIAGNOSIS — M54.17 LUMBOSACRAL RADICULOPATHY: Chronic | ICD-10-CM

## 2023-05-02 PROCEDURE — 99214 OFFICE O/P EST MOD 30 MIN: CPT | Performed by: NURSE PRACTITIONER

## 2023-05-02 ASSESSMENT — FIBROSIS 4 INDEX: FIB4 SCORE: 1.04

## 2023-05-02 NOTE — PROGRESS NOTES
Chief Complaint   Patient presents with    Lab Results     DOS: 4/27/2023     Subjective:     HPI:     Prince Mazariegos is a 72 y.o. female here to discuss the following:    Follow-up labs.  Have reviewed labs with patient.     Lumbosacral Radiculopathy   EMG-no nerve damage per patient.   Pain is not bad today.   Did PT this am. Hurt more in the evenings vs all day now.   Still planning on getting the Epidural- not scheduled yet.   Not able to was walk long distances yet d/t pain.     She tells me has appt next Thursday with Dermatology.   Left posterior calf-area of concern noted by her neurosurgereon during EMG testing.     ROS: : see above        Current Outpatient Medications:     Calcium-Vitamin D-Vitamin K (VIACTIV PO), Take  by mouth., Disp: , Rfl:     lisinopril (PRINIVIL) 40 MG tablet, Take 1 Tablet by mouth every day., Disp: 90 Tablet, Rfl: 3    hydroCHLOROthiazide (HYDRODIURIL) 25 MG Tab, Take 1 Tablet by mouth every evening., Disp: 90 Tablet, Rfl: 3    levothyroxine (SYNTHROID) 75 MCG Tab, Take 1 Tablet by mouth every day., Disp: 90 Tablet, Rfl: 3    metoprolol SR (TOPROL XL) 50 MG TABLET SR 24 HR, Take 1 Tablet by mouth every day., Disp: 90 Tablet, Rfl: 3    rosuvastatin (CRESTOR) 10 MG Tab, Take 1 Tablet by mouth every evening., Disp: 90 Tablet, Rfl: 3    albuterol 108 (90 Base) MCG/ACT Aero Soln inhalation aerosol, Inhale 2 Puffs every 6 hours as needed (cough/wheeze)., Disp: 8.5 g, Rfl: 3    acetaminophen (TYLENOL) 500 MG Tab, Take 500-1,000 mg by mouth every 6 hours as needed., Disp: , Rfl:     Cholecalciferol (VITAMIN D) 2000 UNIT Tab, Take 2,000 Units by mouth every evening., Disp: , Rfl:     polyethylene glycol 3350 (MIRALAX) 17 GM/SCOOP Powder, Take 17 g by mouth every day., Disp: , Rfl:     multivitamin (THERAGRAN) Tab, Take 2 Tabs by mouth every evening., Disp: , Rfl:     Fiber Powder, Take 1 Each by mouth every day., Disp: , Rfl:     Allergies   Allergen Reactions    Hydrocodone Hives     "Advil [Ibuprofen] Hives    Celebrex [Celecoxib]      Affects kidney Function    Latex Rash    Naproxen Sodium Hives    Other Misc Swelling     Some soap & toothpaste puffs, colored laundry soap dryer sheets    Skelaxin [Metaxalone] Hives    Sunscreens Hives       Objective:     Vitals: /58 (BP Location: Right arm, Patient Position: Sitting, BP Cuff Size: Adult)   Pulse 79   Temp 36.2 °C (97.1 °F) (Temporal)   Ht 1.575 m (5' 2\")   Wt 89.4 kg (197 lb)   SpO2 99%   BMI 36.03 kg/m²    General: Alert, pleasant, NAD  HEENT: Normocephalic.  Neck supple.   Respiratory: no distress, no audible wheezing, RR -WNL  Skin: Warm, dry, no rashes. Small flesh colored rough lesions on left posterior leg  Extremities: No leg edema. No discoloration  Neurological: No tremors  Psych:  Affect/mood is normal, judgement is good, memory is intact, grooming is appropriate.    Assessment/Plan:      1. Chronic kidney disease, stage 3a (HCC)  Chronic. GFR has increased-now at 50. Bp well controlled. Continue routine lab work, avoid NSAIDs. Follow up 6 months.     2. Normocytic anemia  CBC stabilized. H&H-back to patient normal trend. Ferritin elevated, B12 mildly low <400. Fecal occult-negative.   We will continue to monitor.    3. Lumbosacral radiculopathy  Chronic. Pain is stable on today's visit. Continue PT, plan for epidural. Follow up with Dr. Larsen.     4. Skin lesion  New finding. Keep appt with Dermatology next week.     5. Mild cognitive impairment  Stable. Last OV patient had mentioned \"forgetting some words\"   Normal-Folic acid, B12 mildly low (<400), Ferritin elevated-suspect transient acute phase reactant. Completed MMSE in clinic was 24. Recommend follow up annually for this.   Continue to remain adequately hydrated, ensure adequate rest, avoid tobacco products, limit alcohol products continue to keep blood pressure and blood cholesterol.     She will start taking Vitamin B12 500-1,000 mcg three times weekly-mildly " low B12 on recent labs.     Return in about 6 months (around 11/2/2023).      Cony GOODE.

## 2023-05-04 ENCOUNTER — APPOINTMENT (RX ONLY)
Dept: URBAN - METROPOLITAN AREA CLINIC 20 | Facility: CLINIC | Age: 73
Setting detail: DERMATOLOGY
End: 2023-05-04

## 2023-05-04 DIAGNOSIS — L82.1 OTHER SEBORRHEIC KERATOSIS: ICD-10-CM

## 2023-05-04 DIAGNOSIS — L71.8 OTHER ROSACEA: ICD-10-CM

## 2023-05-04 PROCEDURE — ? COUNSELING

## 2023-05-04 PROCEDURE — 99213 OFFICE O/P EST LOW 20 MIN: CPT

## 2023-05-04 PROCEDURE — ? ADDITIONAL NOTES

## 2023-05-04 ASSESSMENT — LOCATION DETAILED DESCRIPTION DERM
LOCATION DETAILED: LEFT DISTAL CALF
LOCATION DETAILED: RIGHT UPPER CUTANEOUS LIP

## 2023-05-04 ASSESSMENT — LOCATION ZONE DERM
LOCATION ZONE: LEG
LOCATION ZONE: LIP

## 2023-05-04 ASSESSMENT — LOCATION SIMPLE DESCRIPTION DERM
LOCATION SIMPLE: RIGHT LIP
LOCATION SIMPLE: LEFT CALF

## 2023-05-04 NOTE — PROCEDURE: ADDITIONAL NOTES
Additional Notes: Counseled patient to monitor if present next appt consider bx\\nGave onexton sample to spot treat
Detail Level: Simple
Render Risk Assessment In Note?: no

## 2023-05-04 NOTE — PROCEDURE: MIPS QUALITY
Detail Level: Detailed
Quality 402: Tobacco Use And Help With Quitting Among Adolescents: Patient screened for tobacco and never smoked
Quality 130: Documentation Of Current Medications In The Medical Record: Current Medications Documented
Quality 111:Pneumonia Vaccination Status For Older Adults: Pneumococcal Vaccination Previously Received
Quality 226: Preventive Care And Screening: Tobacco Use: Screening And Cessation Intervention: Patient screened for tobacco use and is an ex/non-smoker
Quality 431: Preventive Care And Screening: Unhealthy Alcohol Use - Screening: Patient screened for unhealthy alcohol use using a single question and scores less than 2 times per year

## 2023-05-24 ENCOUNTER — APPOINTMENT (OUTPATIENT)
Dept: RADIOLOGY | Facility: MEDICAL CENTER | Age: 73
End: 2023-05-24
Attending: EMERGENCY MEDICINE
Payer: MEDICARE

## 2023-05-24 ENCOUNTER — HOSPITAL ENCOUNTER (EMERGENCY)
Facility: MEDICAL CENTER | Age: 73
End: 2023-05-24
Attending: EMERGENCY MEDICINE
Payer: MEDICARE

## 2023-05-24 VITALS
HEIGHT: 62 IN | DIASTOLIC BLOOD PRESSURE: 57 MMHG | TEMPERATURE: 97.1 F | SYSTOLIC BLOOD PRESSURE: 114 MMHG | HEART RATE: 72 BPM | WEIGHT: 198.19 LBS | BODY MASS INDEX: 36.47 KG/M2 | RESPIRATION RATE: 18 BRPM | OXYGEN SATURATION: 96 %

## 2023-05-24 DIAGNOSIS — S93.602A SPRAIN OF LEFT FOOT, INITIAL ENCOUNTER: ICD-10-CM

## 2023-05-24 PROCEDURE — 73610 X-RAY EXAM OF ANKLE: CPT | Mod: LT

## 2023-05-24 PROCEDURE — 99283 EMERGENCY DEPT VISIT LOW MDM: CPT

## 2023-05-24 PROCEDURE — 73630 X-RAY EXAM OF FOOT: CPT | Mod: LT

## 2023-05-24 RX ORDER — HYDROCODONE BITARTRATE AND ACETAMINOPHEN 5; 325 MG/1; MG/1
1 TABLET ORAL EVERY 6 HOURS PRN
Qty: 10 TABLET | Refills: 0 | Status: SHIPPED | OUTPATIENT
Start: 2023-05-24 | End: 2023-05-31

## 2023-05-24 ASSESSMENT — FIBROSIS 4 INDEX: FIB4 SCORE: 1.04

## 2023-05-24 NOTE — ED TRIAGE NOTES
"Bib brother for above complaints.     Chief Complaint   Patient presents with    Foot Pain     Left foot woke up and was in pain. Denies any known injury. Pt states she had similar issue on other foot and ended up getting shot in foot which finally helped. Pt states pain is so bad she cannot sleep.      /57   Pulse 72   Temp 36.2 °C (97.1 °F) (Temporal)   Resp 18   Ht 1.575 m (5' 2\")   Wt 89.9 kg (198 lb 3.1 oz)   SpO2 96%   Breastfeeding No   BMI 36.25 kg/m²     "

## 2023-05-24 NOTE — ED PROVIDER NOTES
ED Provider Note    CHIEF COMPLAINT  Chief Complaint   Patient presents with    Foot Pain     Left foot woke up and was in pain. Denies any known injury. Pt states she had similar issue on other foot and ended up getting shot in foot which finally helped. Pt states pain is so bad she cannot sleep.        EXTERNAL RECORDS REVIEWED  Outpatient Notes previous ER notes    HPI/ROS  LIMITATION TO HISTORY   Select: : None      Prince Mazariegos is a 72 y.o. female who presents for evaluation of foot pain.  The patient states she has been having left medial and lateral foot pain for the last 5 days.  She states that it is worse at night and improves somewhat during the day.  She has been having some problems with the right hip and sciatica and she is currently using a walker to get around because of pain and both her right lower extremity and her left lower extremity.  She denies any traumatic injury that she can recall.  She denies: Fever, chills, URI symptoms, cardiorespiratory symptoms, gastrointestinal symptoms, other joint swelling or pain.  She did receive the COVID-vaccine last Friday.  No other acute symptomatology or complaints.    PAST MEDICAL HISTORY   has a past medical history of Anesthesia, Arrhythmia, Arthritis, Back pain, Breath shortness, Chest pain (May 2016), Chickenpox, DDD (degenerative disc disease), cervical, Disorder of thyroid, Enlarged tonsils, GERD (gastroesophageal reflux disease), Ukrainian measles, Hemorrhoids, High cholesterol, Hypertension, Obesity, Pain, PONV (postoperative nausea and vomiting), Psychiatric disorder, Skin cancer, Sleep apnea, and Snoring.    SURGICAL HISTORY   has a past surgical history that includes lumpectomy (Right, 2007); tonsillectomy; other cardiac surgery; other; hysteroscopy with video diagnostic (10/7/08); dilation and curettage (10/7/08); carpal tunnel endoscopic (10/17/2011); carpal tunnel endoscopic (10/28/2011); carpal tunnel release; and reconstr total shoulder  "implant (Right, 8/14/2019).    FAMILY HISTORY  Family History   Problem Relation Age of Onset    Cancer Mother         Breast cancer    Cancer Sister         Breast cancer    Cancer Other     Cancer Paternal Grandfather         liver    Cancer Father         Pancreatic cancer    Cancer Maternal Aunt     Heart Disease Neg Hx        SOCIAL HISTORY  Social History     Tobacco Use    Smoking status: Never    Smokeless tobacco: Never   Vaping Use    Vaping Use: Never used   Substance and Sexual Activity    Alcohol use: No     Alcohol/week: 0.0 oz    Drug use: Yes     Comment: CBD Cream Only    Sexual activity: Not on file       CURRENT MEDICATIONS  Home Medications       Reviewed by Albert Hawk R.N. (Registered Nurse) on 05/24/23 at 1229  Med List Status: Not Addressed     Medication Last Dose Status   acetaminophen (TYLENOL) 500 MG Tab  Active   albuterol 108 (90 Base) MCG/ACT Aero Soln inhalation aerosol  Active   Calcium-Vitamin D-Vitamin K (VIACTIV PO)  Active   Cholecalciferol (VITAMIN D) 2000 UNIT Tab  Active   Fiber Powder  Active   hydroCHLOROthiazide (HYDRODIURIL) 25 MG Tab  Active   levothyroxine (SYNTHROID) 75 MCG Tab  Active   lisinopril (PRINIVIL) 40 MG tablet  Active   metoprolol SR (TOPROL XL) 50 MG TABLET SR 24 HR  Active   multivitamin (THERAGRAN) Tab  Active   polyethylene glycol 3350 (MIRALAX) 17 GM/SCOOP Powder  Active   rosuvastatin (CRESTOR) 10 MG Tab  Active                    ALLERGIES  Allergies   Allergen Reactions    Hydrocodone Hives    Advil [Ibuprofen] Hives    Celebrex [Celecoxib]      Affects kidney Function    Latex Rash    Naproxen Sodium Hives    Other Misc Swelling     Some soap & toothpaste puffs, colored laundry soap dryer sheets    Skelaxin [Metaxalone] Hives    Sunscreens Hives       PHYSICAL EXAM  VITAL SIGNS: /57   Pulse 72   Temp 36.2 °C (97.1 °F) (Temporal)   Resp 18   Ht 1.575 m (5' 2\")   Wt 89.9 kg (198 lb 3.1 oz)   SpO2 96%   Breastfeeding No   BMI 36.25 " kg/m²    Constitutional: 72-year-old female, sitting fairly comfortably, oriented x3  HENT: Normocephalic, Atraumatic, Nares:Clear, Oropharynx: moist, well hydrated, posterior pharynx:clear   Eyes: PERRL, EOMI, Conjunctiva normal, No discharge.   Neck: Normal range of motion, No tenderness, Supple, No stridor.   Lymphatic: No lymphadenopathy noted.   Cardiovascular: Regular rate and rhythm without mumurs, gallups, rubs   Thorax & Lungs: Normal Equal breath sounds, No respiratory distress, No wheezing, no stridor, no rales. No chest tenderness.   Abdomen: Soft, nontender, nondistended, no organomegaly, positive bowel sounds normal in quality. No guarding or rebound.  Skin: Good skin turgor, pink, warm, dry. No rashes, petechiae, purpura. Normal capillary refill.   Back: No tenderness, No CVA tenderness.   Extremities: Intact distal pulses, No edema, No tenderness, No cyanosis,  Vascular: Pulses are 2+, symmetric in the upper and lower extremities.  Musculoskeletal: Palpable deformity to the upper extremities or right lower extremity; left lower extremity reveals no tenderness to the hip, thigh, knee, leg; left ankle and foot reveals some tenderness over the medial arch of the foot extending toward the medial malleolus, there is some tenderness over the lateral aspect of the foot extending to the posterior aspect of the lateral malleolus; the Achilles is nontender and intact; compartments are soft; no asymmetry comparing the left and right lower extremities;  Neurologic: Alert & oriented x 3,  No gross focal deficits noted.   Psychiatric: Affect normal, Judgment normal, Mood normal.       DIAGNOSTIC STUDIES / PROCEDURES    RADIOLOGY  I have independently interpreted the diagnostic imaging associated with this visit and am waiting the final reading from the radiologist.   My preliminary interpretation is as follows: No evidence of acute fracture or acute bony lesions or pathology identified;  Radiologist  interpretation:   DX-FOOT-COMPLETE 3+ LEFT   Final Result      No evidence of acute fracture or dislocation.      DX-ANKLE 3+ VIEWS LEFT   Final Result      No evidence of acute fracture or dislocation.            COURSE & MEDICAL DECISION MAKING        INITIAL ASSESSMENT, COURSE AND PLAN  Care Narrative: At this time, the patient presents for evaluation of pain to her left foot and ankle region.  The patient has been favoring her right leg secondary to pain and it appears she has been altering her normal gait which is probably causing stress of the tendons and ligaments in her left foot.  Imaging studies were negative.  There is no signs of infection.  There is no evidence of any joint infection.  Based on the findings, the patient is stable for discharge.  I have discussed the findings and treatment plan with the patient.  Her medical record indicates an allergy to hydrocodone.  I discussed this with her.  She is actually taken Vicodin without any problems.  She denies any true allergic symptomatology.  Therefore, we will attempt a trial of Lortab to be used at night for pain.  She is going to use one half of the tablet initially and then a whole tablet if necessary.  She is to follow-up with Dr. Hill whom she is scheduled to see in 1 week.  No other complaints.        ADDITIONAL PROBLEM LIST  1.  Right hip pain with sciatica    DISPOSITION AND DISCUSSIONS      Escalation of care considered, and ultimately not performed:blood analysis      Decision tools and prescription drugs considered including, but not limited to: Antibiotics considered but not indicated; nonsteroidal anti-inflammatory drugs were considered but she states she has severe allergic reactions to these and therefore this will not be prescribed .    1.  Appropriate discharge instructions given  2.  Lortab 5 mg #10;  databank reviewed; informed consent obtained  3.  Follow-up with orthopedic surgery/Dr. Hill foot surgeon    FINAL  DIAGNOSIS  1. Sprain of left foot, initial encounter             Electronically signed by: Guy G Gansert, M.D., 5/24/2023 1:13 PM

## 2023-06-27 ENCOUNTER — OFFICE VISIT (OUTPATIENT)
Dept: SLEEP MEDICINE | Facility: MEDICAL CENTER | Age: 73
End: 2023-06-27
Attending: NURSE PRACTITIONER
Payer: MEDICARE

## 2023-06-27 VITALS
DIASTOLIC BLOOD PRESSURE: 64 MMHG | WEIGHT: 191 LBS | BODY MASS INDEX: 35.15 KG/M2 | HEART RATE: 77 BPM | RESPIRATION RATE: 16 BRPM | HEIGHT: 62 IN | OXYGEN SATURATION: 94 % | SYSTOLIC BLOOD PRESSURE: 118 MMHG

## 2023-06-27 DIAGNOSIS — Z78.9 NONSMOKER: ICD-10-CM

## 2023-06-27 DIAGNOSIS — I10 PRIMARY HYPERTENSION: Chronic | ICD-10-CM

## 2023-06-27 DIAGNOSIS — G47.33 OSA (OBSTRUCTIVE SLEEP APNEA): Chronic | ICD-10-CM

## 2023-06-27 PROCEDURE — 3074F SYST BP LT 130 MM HG: CPT | Performed by: NURSE PRACTITIONER

## 2023-06-27 PROCEDURE — 99213 OFFICE O/P EST LOW 20 MIN: CPT | Performed by: NURSE PRACTITIONER

## 2023-06-27 PROCEDURE — 3078F DIAST BP <80 MM HG: CPT | Performed by: NURSE PRACTITIONER

## 2023-06-27 PROCEDURE — 99212 OFFICE O/P EST SF 10 MIN: CPT | Performed by: NURSE PRACTITIONER

## 2023-06-27 ASSESSMENT — FIBROSIS 4 INDEX: FIB4 SCORE: 1.06

## 2023-06-27 NOTE — PROGRESS NOTES
Chief Complaint   Patient presents with    Follow-Up     Apnea // last seen 5/23/2022       HPI:  Prince Mazariegos is a 73 y.o. year old female here today for follow-up on GENIA.  Last OV 5/23/22     Currently using CPAP @ 10cm H20 nightly; RESMED; device obtained 12/2016.  Compliance report 5/20/2023 through 6/26/2023 indicates 100% compliance, average nightly use 9 hours 59 minutes, minimal to moderate mask leak with reduced AHI 1.2/h.  Reviewed with patient. She notes her device to be working well without issues. She continues to sleep well at night. She goes to bed at 9pm and falls asleep within 15min generally. She wakes 2x's at night to urinate and resumes sleep then wakes by 6:30am. She denies AM headaches, SOB or grogginess. No daytime sleepiness or napping.  She is being seen by Dr. Juan Antonio Larsen for nerve impingement in right hip/knee and receiving injections with improvement.  She denies cardiac or respiratory symptoms today.    Sleep hx:  HSS from June 2016 showed an AHI of 12.2 with low oxygenation of 76%.  Currently she is being treated with CPAP @ 56tdO77. Current device obtained 2017.      ROS: As per HPI and otherwise negative if not stated.    Past Medical History:   Diagnosis Date    Anesthesia     PONV    Arrhythmia     Arthritis     osteo neck right knee back right shoulder    Back pain     Breath shortness     related to pain    Chest pain May 2016    PET scan with no infarct or ischemia, normal LVEF.    Chickenpox     DDD (degenerative disc disease), cervical     Disorder of thyroid     Enlarged tonsils     Removed with adenoids    GERD (gastroesophageal reflux disease)     Greek measles     Hemorrhoids     bleeding    High cholesterol     Hypertension     pt states well controlled on meds    Obesity     Pain     right shoulder/ torn rotator cuff    PONV (postoperative nausea and vomiting)     Psychiatric disorder     Anxiety  not taking any medications    Skin cancer     skin    Sleep apnea      uses cpap    Snoring        Past Surgical History:   Procedure Laterality Date    PB RECONSTR TOTAL SHOULDER IMPLANT Right 8/14/2019    Procedure: ARTHROPLASTY, SHOULDER, TOTAL- REVERSE;  Surgeon: Parish Garcia M.D.;  Location: SURGERY [facility];  Service: Orthopedics    CARPAL TUNNEL ENDOSCOPIC  10/28/2011    Performed by EDGAR GARCIA at SURGERY SAME DAY [facility]    CARPAL TUNNEL ENDOSCOPIC  10/17/2011    Performed by EDGAR GARCIA at SURGERY SAME DAY [facility]    HYSTEROSCOPY WITH VIDEO DIAGNOSTIC  10/7/08    Performed by MICHELINE WEBER at SURGERY SAME DAY [facility]    DILATION AND CURETTAGE  10/7/08    Performed by MICHELINE WEBER at SURGERY SAME DAY [facility]    LUMPECTOMY Right 2007    Breast lump removed    CARPAL TUNNEL RELEASE      OTHER      hemorrhoid banding    OTHER CARDIAC SURGERY      Cath - 4-5 years ago with challapalli    TONSILLECTOMY         Family History   Problem Relation Age of Onset    Cancer Mother         Breast cancer    Cancer Sister         Breast cancer    Cancer Other     Cancer Paternal Grandfather         liver    Cancer Father         Pancreatic cancer    Cancer Maternal Aunt     Heart Disease Neg Hx        Social History     Socioeconomic History    Marital status: Single     Spouse name: Not on file    Number of children: Not on file    Years of education: Not on file    Highest education level: Master's degree (e.g., MA, MS, Daniele, MEd, MSW, LEE)   Occupational History    Not on file   Tobacco Use    Smoking status: Never    Smokeless tobacco: Never   Vaping Use    Vaping Use: Never used   Substance and Sexual Activity    Alcohol use: No     Alcohol/week: 0.0 oz    Drug use: Yes     Comment: CBD Cream Only    Sexual activity: Not on file   Other Topics Concern    Not on file   Social History Narrative    Not on file     Social Determinants of Health     Financial Resource Strain: Low Risk  (5/27/2020)    Overall Financial  "Resource Strain (CARDIA)     Difficulty of Paying Living Expenses: Not hard at all   Food Insecurity: No Food Insecurity (4/1/2023)    Hunger Vital Sign     Worried About Running Out of Food in the Last Year: Never true     Ran Out of Food in the Last Year: Never true   Transportation Needs: No Transportation Needs (4/1/2023)    PRAPARE - Transportation     Lack of Transportation (Medical): No     Lack of Transportation (Non-Medical): No   Physical Activity: Sufficiently Active (4/1/2023)    Exercise Vital Sign     Days of Exercise per Week: 7 days     Minutes of Exercise per Session: 80 min   Stress: No Stress Concern Present (4/1/2023)    [place] [employer] - Occupational Stress Questionnaire     Feeling of Stress : Not at all   Social Connections: Socially Isolated (4/1/2023)    Social Connection and Isolation Panel [NHANES]     Frequency of Communication with Friends and Family: More than three times a week     Frequency of Social Gatherings with Friends and Family: Twice a week     Attends Latter-day Services: Never     Active Member of Clubs or Organizations: No     Attends Club or Organization Meetings: Never     Marital Status: Never    Intimate Partner Violence: Not on file   Housing Stability: Low Risk  (4/1/2023)    Housing Stability Vital Sign     Unable to Pay for Housing in the Last Year: No     Number of Places Lived in the Last Year: 1     Unstable Housing in the Last Year: No       Allergies as of 06/27/2023 - Reviewed 06/27/2023   Allergen Reaction Noted    Hydrocodone Hives 04/04/2023    Advil [ibuprofen] Hives 04/04/2023    Celebrex [celecoxib]  04/04/2023    Latex Rash 10/12/2011    Naproxen sodium Hives 06/15/2008    Other misc Swelling 10/12/2011    Skelaxin [metaxalone] Hives 06/15/2008    Sunscreens Hives 09/16/2015        Vitals:  /64 (BP Location: Left arm, Patient Position: Sitting, BP Cuff Size: Adult)   Pulse 77   Resp 16   Ht 1.575 m (5' 2\")   Wt " 86.6 kg (191 lb)   SpO2 94%     Current medications as of today   Current Outpatient Medications   Medication Sig Dispense Refill    Calcium-Vitamin D-Vitamin K (VIACTIV PO) Take 2 Tablets by mouth every evening.      lisinopril (PRINIVIL) 40 MG tablet Take 1 Tablet by mouth every day. 90 Tablet 3    hydroCHLOROthiazide (HYDRODIURIL) 25 MG Tab Take 1 Tablet by mouth every evening. 90 Tablet 3    levothyroxine (SYNTHROID) 75 MCG Tab Take 1 Tablet by mouth every day. 90 Tablet 3    metoprolol SR (TOPROL XL) 50 MG TABLET SR 24 HR Take 1 Tablet by mouth every day. 90 Tablet 3    rosuvastatin (CRESTOR) 10 MG Tab Take 1 Tablet by mouth every evening. 90 Tablet 3    acetaminophen (TYLENOL) 500 MG Tab Take 1,000 mg by mouth every 6 hours as needed for Mild Pain. Indications: Pain      Cholecalciferol (VITAMIN D) 2000 UNIT Tab Take 2,000 Units by mouth every evening.      polyethylene glycol 3350 (MIRALAX) 17 GM/SCOOP Powder Take 17 g by mouth every day.      multivitamin (THERAGRAN) Tab Take 2 Tabs by mouth every evening.      Fiber Powder Take 1 Each by mouth every day.       No current facility-administered medications for this visit.         Physical Exam:   Gen:           Alert and oriented, No apparent distress. Mood and affect appropriate, normal interaction with examiner.  Eyes:          PERRL, EOM intact, sclere white, conjunctive moist. GLasses.  Ears:          Not examined.   Hearing:     Grossly intact.  Nose:          Normal, no lesions or deformities.  Dentition:    Not examined.   Oropharynx:   Not examined.   Mallampati Classification: Not examined.   Neck:        Supple, trachea midline, no masses.  Respiratory Effort: No intercostal retractions or use of accessory muscles.   Lung Auscultation:      Clear to auscultation bilaterally; no rales, rhonchi or wheezing.  CV:            Regular rate and rhythm. No murmurs, rubs or gallops.  Abd:           Not examined.   Lymphadenopathy: Not examined.  Gait and  Station: Normal.  Digits and Nails: No clubbing, cyanosis, petechiae, or nodes.   Cranial Nerves: II-XII grossly intact.  Skin:        No rashes, lesions or ulcers noted.               Ext:           No cyanosis or edema.      Assessment:  1. GENIA (obstructive sleep apnea)        2. Primary hypertension        3. BMI 34.0-34.9,adult  HEIGHT AND WEIGHT      4. Nonsmoker            Immunizations:    Flu:10/7/22  Pneumovax 23:2018  Prevnar 13:2019  PCV 20: not due  COVID-19: 5/20/23    Plan:  GENIA is well controlled; she declines upgrading her device at this time. Continue CPAP 10cm nightly. Recommend updating CNOX to verify nocturnal saturations are adequate and can send results via [employer] with recommendations.   CNOX on pap now   DME mask/supplies  F/u with PCP for ongoing management of other health concerns including HTN  Encourage weight loss through healthy diet and increased activity  Follow up in 1 year with compliance report, sooner if needed.    Please note that this dictation was created using voice recognition software. I have made every reasonable attempt to correct obvious errors, but it is possible there are errors of grammar and possibly content that I did not discover before finalizing the note.

## 2023-08-16 ENCOUNTER — APPOINTMENT (RX ONLY)
Dept: URBAN - METROPOLITAN AREA CLINIC 20 | Facility: CLINIC | Age: 73
Setting detail: DERMATOLOGY
End: 2023-08-16

## 2023-08-16 ENCOUNTER — HOME STUDY (OUTPATIENT)
Dept: SLEEP MEDICINE | Facility: MEDICAL CENTER | Age: 73
End: 2023-08-16
Attending: NURSE PRACTITIONER
Payer: MEDICARE

## 2023-08-16 DIAGNOSIS — G47.33 OSA (OBSTRUCTIVE SLEEP APNEA): Chronic | ICD-10-CM

## 2023-08-16 DIAGNOSIS — L82.1 OTHER SEBORRHEIC KERATOSIS: ICD-10-CM

## 2023-08-16 DIAGNOSIS — D22 MELANOCYTIC NEVI: ICD-10-CM

## 2023-08-16 DIAGNOSIS — D18.0 HEMANGIOMA: ICD-10-CM

## 2023-08-16 DIAGNOSIS — L57.8 OTHER SKIN CHANGES DUE TO CHRONIC EXPOSURE TO NONIONIZING RADIATION: ICD-10-CM

## 2023-08-16 DIAGNOSIS — L81.4 OTHER MELANIN HYPERPIGMENTATION: ICD-10-CM

## 2023-08-16 PROBLEM — D18.01 HEMANGIOMA OF SKIN AND SUBCUTANEOUS TISSUE: Status: ACTIVE | Noted: 2023-08-16

## 2023-08-16 PROBLEM — D22.5 MELANOCYTIC NEVI OF TRUNK: Status: ACTIVE | Noted: 2023-08-16

## 2023-08-16 PROBLEM — D22.71 MELANOCYTIC NEVI OF RIGHT LOWER LIMB, INCLUDING HIP: Status: ACTIVE | Noted: 2023-08-16

## 2023-08-16 PROBLEM — D22.61 MELANOCYTIC NEVI OF RIGHT UPPER LIMB, INCLUDING SHOULDER: Status: ACTIVE | Noted: 2023-08-16

## 2023-08-16 PROBLEM — D22.62 MELANOCYTIC NEVI OF LEFT UPPER LIMB, INCLUDING SHOULDER: Status: ACTIVE | Noted: 2023-08-16

## 2023-08-16 PROBLEM — D22.72 MELANOCYTIC NEVI OF LEFT LOWER LIMB, INCLUDING HIP: Status: ACTIVE | Noted: 2023-08-16

## 2023-08-16 PROCEDURE — ? COUNSELING

## 2023-08-16 PROCEDURE — 99213 OFFICE O/P EST LOW 20 MIN: CPT

## 2023-08-16 PROCEDURE — 94762 N-INVAS EAR/PLS OXIMTRY CONT: CPT | Performed by: PREVENTIVE MEDICINE

## 2023-08-16 ASSESSMENT — LOCATION SIMPLE DESCRIPTION DERM
LOCATION SIMPLE: LEFT HAND
LOCATION SIMPLE: RIGHT CHEEK
LOCATION SIMPLE: RIGHT THIGH
LOCATION SIMPLE: RIGHT UPPER BACK
LOCATION SIMPLE: ABDOMEN
LOCATION SIMPLE: LEFT THIGH
LOCATION SIMPLE: RIGHT ANTERIOR NECK
LOCATION SIMPLE: RIGHT POSTERIOR UPPER ARM
LOCATION SIMPLE: LEFT EAR
LOCATION SIMPLE: LEFT KNEE
LOCATION SIMPLE: RIGHT HAND
LOCATION SIMPLE: LEFT CALF
LOCATION SIMPLE: RIGHT CALF
LOCATION SIMPLE: LEFT POSTERIOR UPPER ARM
LOCATION SIMPLE: SCALP
LOCATION SIMPLE: RIGHT EAR
LOCATION SIMPLE: LEFT UPPER BACK

## 2023-08-16 ASSESSMENT — LOCATION ZONE DERM
LOCATION ZONE: FACE
LOCATION ZONE: SCALP
LOCATION ZONE: LEG
LOCATION ZONE: NECK
LOCATION ZONE: EAR
LOCATION ZONE: TRUNK
LOCATION ZONE: ARM
LOCATION ZONE: HAND

## 2023-08-16 ASSESSMENT — LOCATION DETAILED DESCRIPTION DERM
LOCATION DETAILED: LEFT DISTAL POSTERIOR UPPER ARM
LOCATION DETAILED: RIGHT ANTERIOR PROXIMAL THIGH
LOCATION DETAILED: LEFT RADIAL DORSAL HAND
LOCATION DETAILED: RIGHT INFERIOR CENTRAL MALAR CHEEK
LOCATION DETAILED: RIGHT LATERAL ABDOMEN
LOCATION DETAILED: RIGHT INFERIOR MEDIAL UPPER BACK
LOCATION DETAILED: LEFT PROXIMAL CALF
LOCATION DETAILED: RIGHT INFERIOR ANTERIOR NECK
LOCATION DETAILED: LEFT INFERIOR POSTAURICULAR SKIN
LOCATION DETAILED: RIGHT SUPERIOR UPPER BACK
LOCATION DETAILED: LEFT KNEE
LOCATION DETAILED: RIGHT RIB CAGE
LOCATION DETAILED: LEFT SUPERIOR CRUS OF ANTIHELIX
LOCATION DETAILED: RIGHT SUPERIOR HELIX
LOCATION DETAILED: LEFT SUPERIOR UPPER BACK
LOCATION DETAILED: RIGHT RADIAL DORSAL HAND
LOCATION DETAILED: RIGHT PROXIMAL CALF
LOCATION DETAILED: LEFT ANTERIOR PROXIMAL THIGH
LOCATION DETAILED: RIGHT DISTAL POSTERIOR UPPER ARM

## 2023-08-20 NOTE — PROCEDURES
OXIMETRY Interpretation:    This overnight oximetry was recorded on 8/17/2023 with the patient on CPAP on [id number].  The analysis duration was 7 hrs. 49 mins. 50 total oximetric events occurred encompassing 51.0 minutes .  The average event duration was  61.2 seconds .  The saturations were less than 90% for 5.2% of the recording.  The shanique saturation was 75% .  The patient spent 3.2 minutes with saturations < 88%.  Overall, this continuous nocturnal oximetry demonstrates adequate oxygen saturation while on positive airway pressure therapy.      Recommendation:    Clinical correlation is needed.

## 2023-09-01 ENCOUNTER — HOSPITAL ENCOUNTER (EMERGENCY)
Facility: MEDICAL CENTER | Age: 73
End: 2023-09-01
Attending: EMERGENCY MEDICINE
Payer: MEDICARE

## 2023-09-01 VITALS
WEIGHT: 199.96 LBS | OXYGEN SATURATION: 99 % | RESPIRATION RATE: 18 BRPM | DIASTOLIC BLOOD PRESSURE: 62 MMHG | SYSTOLIC BLOOD PRESSURE: 124 MMHG | HEIGHT: 62 IN | HEART RATE: 72 BPM | BODY MASS INDEX: 36.8 KG/M2 | TEMPERATURE: 98.2 F

## 2023-09-01 DIAGNOSIS — M79.661 PAIN OF RIGHT LOWER LEG: ICD-10-CM

## 2023-09-01 LAB — D DIMER PPP IA.FEU-MCNC: 0.68 UG/ML (FEU) (ref 0–0.5)

## 2023-09-01 PROCEDURE — 85379 FIBRIN DEGRADATION QUANT: CPT

## 2023-09-01 PROCEDURE — 36415 COLL VENOUS BLD VENIPUNCTURE: CPT

## 2023-09-01 PROCEDURE — 99284 EMERGENCY DEPT VISIT MOD MDM: CPT

## 2023-09-01 RX ORDER — GABAPENTIN 100 MG/1
100 CAPSULE ORAL
Qty: 14 CAPSULE | Refills: 0 | Status: SHIPPED | OUTPATIENT
Start: 2023-09-01 | End: 2023-09-15

## 2023-09-01 ASSESSMENT — FIBROSIS 4 INDEX: FIB4 SCORE: 1.06

## 2023-09-01 NOTE — ED NOTES
PT notes right distal leg pain, worse upon palpation. Denies injury. PT AAOX4, respirations even, unlabored. Pt ambulates to bathroom unassisted w/steady gate.

## 2023-09-01 NOTE — ED TRIAGE NOTES
"Pt ambulated to triage for the following    Chief Complaint   Patient presents with    Leg Pain     Pt has been dx of pinch nerve in her right leg, pt stated that yesterday morning she noticed pain in her lower leg. Pt states she can feel the pain constantly but when something touches it the pain is unbearable.      /65   Pulse 74   Temp 36.2 °C (97.2 °F) (Temporal)   Resp 16   Ht 1.575 m (5' 2\")   Wt 90.7 kg (199 lb 15.3 oz)   SpO2 98%   BMI 36.57 kg/m²     "

## 2023-09-01 NOTE — ED PROVIDER NOTES
ER Provider Note    Scribed for Jason Kaiser Ii, M.d. by Jus Laura. 9/1/2023  2:44 AM    Primary Care Provider: CHLOE Gibbs    CHIEF COMPLAINT  Chief Complaint   Patient presents with    Leg Pain     Pt has been dx of pinch nerve in her right leg, pt stated that yesterday morning she noticed pain in her lower right leg. Pt states she can feel the pain constantly but when something touches it the pain is unbearable.      EXTERNAL RECORDS REVIEWED  Outpatient Notes Seen for sleep apnea in June 2023    HPI/ROS    LIMITATION TO HISTORY   Select: : None    Prince Mazariegos is a 73 y.o. female who presents to the ED complaining of right leg pain onset yesterday. She has a history of a pinched nerve in that leg. However she has pain to the calf of the right leg which is new and different from her pinched nerve pain. This pain is keeping her from sleeping. She took tylenol for the past yesterday and it did not really hurt.  The pain is not present at rest but is exacerbated with some leg movements or pressure on the calf muscle. She has not noticed any swelling, rash, or fever.  She has back pain but it is related to her pinched nerve. She does not have a history of diabetes or DVT.    PAST MEDICAL HISTORY  Past Medical History:   Diagnosis Date    Anesthesia     PONV    Arrhythmia     Arthritis     osteo neck right knee back right shoulder    Back pain     Breath shortness     related to pain    Chest pain May 2016    PET scan with no infarct or ischemia, normal LVEF.    Chickenpox     DDD (degenerative disc disease), cervical     Disorder of thyroid     Enlarged tonsils     Removed with adenoids    GERD (gastroesophageal reflux disease)     [place]aro[place] measles     Hemorrhoids     bleeding    High cholesterol     Hypertension     pt states well controlled on meds    Obesity     Pain     right shoulder/ torn rotator cuff    PONV (postoperative nausea and vomiting)     Psychiatric disorder      Anxiety  not taking any medications    Skin cancer     skin    Sleep apnea     uses cpap    Snoring        SURGICAL HISTORY  Past Surgical History:   Procedure Laterality Date    PB RECONSTR TOTAL SHOULDER IMPLANT Right 8/14/2019    Procedure: ARTHROPLASTY, SHOULDER, TOTAL- REVERSE;  Surgeon: Parish Garcia M.D.;  Location: SURGERY [facility];  Service: Orthopedics    CARPAL TUNNEL ENDOSCOPIC  10/28/2011    Performed by EDGAR GARCIA at SURGERY SAME DAY [facility]    CARPAL TUNNEL ENDOSCOPIC  10/17/2011    Performed by EDGAR GARCIA at SURGERY SAME DAY [facility]    HYSTEROSCOPY WITH VIDEO DIAGNOSTIC  10/7/08    Performed by MICHELINE WEBER at SURGERY SAME DAY [facility]    DILATION AND CURETTAGE  10/7/08    Performed by MICHELINE WEBER at SURGERY SAME DAY [facility]    LUMPECTOMY Right 2007    Breast lump removed    CARPAL TUNNEL RELEASE      OTHER      hemorrhoid banding    OTHER CARDIAC SURGERY      Cath - 4-5 years ago with challapalli    TONSILLECTOMY         FAMILY HISTORY  Family History   Problem Relation Age of Onset    Cancer Mother         Breast cancer    Cancer Sister         Breast cancer    Cancer Other     Cancer Paternal Grandfather         liver    Cancer Father         Pancreatic cancer    Cancer Maternal Aunt     Heart Disease Neg Hx        SOCIAL HISTORY   reports that she has never smoked. She has never used smokeless tobacco. She reports current drug use. She reports that she does not drink alcohol.    CURRENT MEDICATIONS  Discharge Medication List as of 9/1/2023  4:31 AM        CONTINUE these medications which have NOT CHANGED    Details   Calcium-Vitamin D-Vitamin K (VIACTIV PO) Take 2 Tablets by mouth every evening., Historical Med      lisinopril (PRINIVIL) 40 MG tablet Take 1 Tablet by mouth every day., Disp-90 Tablet, R-3, Normal      hydroCHLOROthiazide (HYDRODIURIL) 25 MG Tab Take 1 Tablet by mouth every evening., Disp-90 Tablet, R-3, Normal     "  levothyroxine (SYNTHROID) 75 MCG Tab Take 1 Tablet by mouth every day., Disp-90 Tablet, R-3, Normal      metoprolol SR (TOPROL XL) 50 MG TABLET SR 24 HR Take 1 Tablet by mouth every day., Disp-90 Tablet, R-3, Normal      rosuvastatin (CRESTOR) 10 MG Tab Take 1 Tablet by mouth every evening., Disp-90 Tablet, R-3, Normal      acetaminophen (TYLENOL) 500 MG Tab Take 1,000 mg by mouth every 6 hours as needed for Mild Pain. Indications: Pain, Historical Med      Cholecalciferol (VITAMIN D) 2000 UNIT Tab Take 2,000 Units by mouth every evening., Historical Med      polyethylene glycol 3350 (MIRALAX) 17 GM/SCOOP Powder Take 17 g by mouth every day., Historical Med      multivitamin (THERAGRAN) Tab Take 2 Tabs by mouth every evening., Historical Med      Fiber Powder Take 1 Each by mouth every day., Historical Med             ALLERGIES  Hydrocodone, Advil [ibuprofen], Celebrex [celecoxib], Latex, Naproxen sodium, Other misc, Skelaxin [metaxalone], and Sunscreens    PHYSICAL EXAM  /65   Pulse 74   Temp 36.2 °C (97.2 °F) (Temporal)   Resp 16   Ht 1.575 m (5' 2\")   Wt 90.7 kg (199 lb 15.3 oz)   SpO2 98%   BMI 36.57 kg/m²   Physical Exam  Vitals and nursing note reviewed.   Constitutional:       Appearance: Normal appearance.   HENT:      Head: Normocephalic.   Cardiovascular:      Rate and Rhythm: Normal rate.   Pulmonary:      Effort: Pulmonary effort is normal.   Musculoskeletal:      Comments: Right calf with tenderness at the lateral side. No significant edema. Compartment soft.  There are superficial varicose veins at bilateral lower extremities. Legs are warm and well perfused.    Neurological:      Mental Status: She is alert.        DIAGNOSTIC STUDIES    Labs:   Results for orders placed or performed during the hospital encounter of 09/01/23   D-DIMER   Result Value Ref Range    D-Dimer 0.68 (H) 0.00 - 0.50 ug/mL (FEU)     All labs reviewed by me.     COURSE & MEDICAL DECISION MAKING     ED Observation " Status? Yes; I am placing the patient in to an observation status due to a diagnostic uncertainty as well as therapeutic intensity. Patient placed in observation status at 2:51 AM, 9/1/2023.     Observation plan is as follows: Monitor for symptom management and diagnostic results     Upon Reevaluation, the patient's condition has: Improved; and will be discharged.    Patient discharged from ED Observation status at 4:15 AM (Time) 9/1/2023 (Date).     INITIAL ASSESSMENT, COURSE AND PLAN  Care Narrative:     2:51 AM - Patient seen and examined at bedside. She presents for concern of DVT due to right calf pain. Has a history of a pinched nerve in her right leg, endorses that this pain is different. Exam shows right calf tenderness, no concern for compartment syndrome. Differential diagnoses include but not limited to: neuropathic pain, contusion, DVT. As DVT is her main concern, plan is to order D-dimer and if positive will order ultrasound.    4:15 AM - D-dimer returned at 0.68, after adjusting for age this is a normal value. Unclear cause of pain but my suspicion for an underlying emergent condition such as DVT, arterial occlusion, infection is quite low.  It very well could be a radicular pain.  Sent a prescription for gabapentin to help with discomfort at night. Plan is for discharge and follow up with PCP. Patient will now be discharged at this time. Discussed return precautions such as leg swelling, skin changes or fever. Patient verbalizes understanding and agreement to this plan of care.       PROBLEM LIST  #Right leg pain    DISPOSITION AND DISCUSSIONS    Discussion of management with other QHP or appropriate source(s): None     Barriers to care at this time, including but not limited to: None    Decision tools and prescription drugs considered including, but not limited to: D-dimer negative after adjusting for age .    DISPOSITION:  Patient will be discharged home in stable condition.    FOLLOW UP:  Cony  FANY Guerrero A.P.R.NMiguel  [address]  [address]  [place] [id number] [phone]    Schedule an appointment as soon as possible for a visit   For recheck of leg pain if nor improving    [facility], Emergency Dept  [address]  [place] [id number] [phone]    fever, leg swelling, skin changes or other serious concerns    OUTPATIENT MEDICATIONS:  Discharge Medication List as of 9/1/2023  4:31 AM        START taking these medications    Details   gabapentin (NEURONTIN) 100 MG Cap Take 1 Capsule by mouth at bedtime as needed (for leg pain) for up to 14 days., Disp-14 Capsule, R-0, Normal            FINAL DIANGOSIS  1. Pain of right lower leg         IJus (Darylibjosseline), am scribing for, and in the presence of, ANAMIKA Richards II.    Electronically signed by: Jus Laura (Jessica), 9/1/2023    IJason II, M* personally performed the services described in this documentation, as scribed by Jus Laura in my presence, and it is both accurate and complete.      The note accurately reflects work and decisions made by me.  Jason Kaiser II, M.D.  9/1/2023  5:41 AM

## 2023-09-07 ENCOUNTER — OFFICE VISIT (OUTPATIENT)
Dept: MEDICAL GROUP | Facility: MEDICAL CENTER | Age: 73
End: 2023-09-07
Payer: MEDICARE

## 2023-09-07 VITALS
HEIGHT: 62 IN | OXYGEN SATURATION: 95 % | SYSTOLIC BLOOD PRESSURE: 108 MMHG | BODY MASS INDEX: 36.44 KG/M2 | DIASTOLIC BLOOD PRESSURE: 62 MMHG | WEIGHT: 198 LBS | RESPIRATION RATE: 16 BRPM | HEART RATE: 71 BPM | TEMPERATURE: 97.2 F

## 2023-09-07 DIAGNOSIS — I10 PRIMARY HYPERTENSION: Chronic | ICD-10-CM

## 2023-09-07 DIAGNOSIS — E03.9 HYPOTHYROIDISM, UNSPECIFIED TYPE: Chronic | ICD-10-CM

## 2023-09-07 DIAGNOSIS — E78.5 DYSLIPIDEMIA: Chronic | ICD-10-CM

## 2023-09-07 DIAGNOSIS — N18.31 CHRONIC KIDNEY DISEASE, STAGE 3A: Chronic | ICD-10-CM

## 2023-09-07 DIAGNOSIS — M79.604 PAIN OF RIGHT LOWER EXTREMITY: ICD-10-CM

## 2023-09-07 DIAGNOSIS — D64.9 NORMOCYTIC ANEMIA: ICD-10-CM

## 2023-09-07 DIAGNOSIS — M54.17 LUMBOSACRAL RADICULOPATHY: ICD-10-CM

## 2023-09-07 DIAGNOSIS — I83.93 VARICOSE VEINS OF BOTH LOWER EXTREMITIES, UNSPECIFIED WHETHER COMPLICATED: ICD-10-CM

## 2023-09-07 PROCEDURE — 99214 OFFICE O/P EST MOD 30 MIN: CPT | Performed by: NURSE PRACTITIONER

## 2023-09-07 PROCEDURE — 3078F DIAST BP <80 MM HG: CPT | Performed by: NURSE PRACTITIONER

## 2023-09-07 PROCEDURE — 3074F SYST BP LT 130 MM HG: CPT | Performed by: NURSE PRACTITIONER

## 2023-09-07 ASSESSMENT — FIBROSIS 4 INDEX: FIB4 SCORE: 1.06

## 2023-09-07 NOTE — PROGRESS NOTES
Chief Complaint   Patient presents with    Follow-Up     ER visit in 23, still having leg pain, want to talk to gabapentin,      Subjective:     HPI:     Prince Mazariegos is a 73 y.o. female here to discuss the followin month follow up    Recently had ER visit for leg pain-acute DVT, infection ruled out.    She was given gabapentin however she did not want to start this as she was concerned because she does have reduced GFR.    Have informed her that at the dose that was prescribed this is an appropriate dose and will not affect her kidney function.    Her pain is better however still sore when she applies pressure to the right lateral aspect of her right lower extremity.  There is no redness, swelling present.  She does have lumbosacral radiculopathy.  She tells me that she did have an epidural back at the end of May which was helpful for a bit.  She has been doing some PT which has been helpful.    ROS: : see above        Current Outpatient Medications:     Calcium-Vitamin D-Vitamin K (VIACTIV PO), Take 2 Tablets by mouth every evening., Disp: , Rfl:     lisinopril (PRINIVIL) 40 MG tablet, Take 1 Tablet by mouth every day., Disp: 90 Tablet, Rfl: 3    hydroCHLOROthiazide (HYDRODIURIL) 25 MG Tab, Take 1 Tablet by mouth every evening., Disp: 90 Tablet, Rfl: 3    levothyroxine (SYNTHROID) 75 MCG Tab, Take 1 Tablet by mouth every day., Disp: 90 Tablet, Rfl: 3    metoprolol SR (TOPROL XL) 50 MG TABLET SR 24 HR, Take 1 Tablet by mouth every day., Disp: 90 Tablet, Rfl: 3    rosuvastatin (CRESTOR) 10 MG Tab, Take 1 Tablet by mouth every evening., Disp: 90 Tablet, Rfl: 3    acetaminophen (TYLENOL) 500 MG Tab, Take 1,000 mg by mouth every 6 hours as needed for Mild Pain. Indications: Pain, Disp: , Rfl:     Cholecalciferol (VITAMIN D) 2000 UNIT Tab, Take 2,000 Units by mouth every evening., Disp: , Rfl:     polyethylene glycol 3350 (MIRALAX) 17 GM/SCOOP Powder, Take 17 g by mouth every day., Disp: , Rfl:      "multivitamin (THERAGRAN) Tab, Take 2 Tabs by mouth every evening., Disp: , Rfl:     Fiber Powder, Take 1 Each by mouth every day., Disp: , Rfl:     gabapentin (NEURONTIN) 100 MG Cap, Take 1 Capsule by mouth at bedtime as needed (for leg pain) for up to 14 days. (Patient not taking: Reported on 9/7/2023), Disp: 14 Capsule, Rfl: 0    Allergies   Allergen Reactions    Hydrocodone Hives    Advil [Ibuprofen] Hives    Celebrex [Celecoxib]      Affects kidney Function    Latex Rash    Naproxen Sodium Hives    Other Misc Swelling     Some soap & toothpaste puffs, colored laundry soap dryer sheets    Skelaxin [Metaxalone] Hives    Sunscreens Hives       Objective:     Vitals: /62   Pulse 71   Temp 36.2 °C (97.2 °F) (Temporal)   Resp 16   Ht 1.575 m (5' 2\")   Wt 89.8 kg (198 lb)   SpO2 95%   BMI 36.21 kg/m²    General: Alert, pleasant, NAD  HEENT: Normocephalic.  Neck supple.   Respiratory: no distress, no audible wheezing, RR -WNL  Skin: Warm, dry, no rashes.  Extremities: No leg edema. No discoloration. Varicose veins noted on exam.  Neurological: No tremors  Psych:  Affect/mood is normal, judgement is good, memory is intact, grooming is appropriate.    Assessment/Plan:      1. Pain of right lower extremity  Acute on chronic.  At this time given her reported symptoms it is most likely that this is secondary radiculary pain.  Recommend trial of gabapentin.    2. Primary hypertension  Chronic, stable.  Continue hydrochlorothiazide, lisinopril.  Update labs prior to next office visit.  - Comp Metabolic Panel; Future  - MICROALBUMIN CREAT RATIO URINE; Future    3. Chronic kidney disease, stage 3a (HCC)  Chronic, stable.  Continue to avoid excessive use of NSAIDs.  Renal function every 6 months.  - Comp Metabolic Panel; Future  - MICROALBUMIN CREAT RATIO URINE; Future    4. Hypothyroidism, unspecified type  Chronic, stable on current levothyroxine.  Continue levothyroxine 70 mcg daily.  Update TSH levels prior to " next office visit.  - TSH; Future    5. Dyslipidemia  Chronic.  On statin therapy.  Continue rosuvastatin  Update lipid panel prior to next office visit.  - Lipid Profile; Future    6. Normocytic anemia  Chronic, stable.  H&H-back to patient normal trend.  Previous work up with elevated Ferritin elevated, B12 mildly low <400  Fecal occult-negative.   Update labs.  - CBC WITHOUT DIFFERENTIAL; Future  - VITAMIN B12; Future    7. Lumbosacral radiculopathy  Chronic.  S/p epidural injection with Dr. Kahn  Continue physical therapy, trial of Gabapentin.  Follow-up with [facility].     8. Varicose veins of both lower extremities, unspecified whether complicated  Noted on exam.  Recommend trial of compression stockings.    Return in about 6 months (around 3/7/2024) for 6 month follow up, labs.    {I have placed the above orders and discussed them with an approved delegating provider. The MA is performing the below orders under the direction of Dr. Carly CORONA

## 2023-09-11 PROBLEM — I83.93 VARICOSE VEINS OF BOTH LOWER EXTREMITIES: Status: ACTIVE | Noted: 2023-09-11

## 2024-03-01 ENCOUNTER — HOSPITAL ENCOUNTER (OUTPATIENT)
Dept: LAB | Facility: MEDICAL CENTER | Age: 74
End: 2024-03-01
Attending: NURSE PRACTITIONER
Payer: MEDICARE

## 2024-03-01 DIAGNOSIS — D64.9 NORMOCYTIC ANEMIA: ICD-10-CM

## 2024-03-01 DIAGNOSIS — N18.31 CHRONIC KIDNEY DISEASE, STAGE 3A: Chronic | ICD-10-CM

## 2024-03-01 DIAGNOSIS — E03.9 HYPOTHYROIDISM, UNSPECIFIED TYPE: Chronic | ICD-10-CM

## 2024-03-01 DIAGNOSIS — E78.5 DYSLIPIDEMIA: Chronic | ICD-10-CM

## 2024-03-01 DIAGNOSIS — I10 PRIMARY HYPERTENSION: Chronic | ICD-10-CM

## 2024-03-01 LAB
ALBUMIN SERPL BCP-MCNC: 4.3 G/DL (ref 3.2–4.9)
ALBUMIN/GLOB SERPL: 1.5 G/DL
ALP SERPL-CCNC: 106 U/L (ref 30–99)
ALT SERPL-CCNC: 10 U/L (ref 2–50)
ANION GAP SERPL CALC-SCNC: 14 MMOL/L (ref 7–16)
AST SERPL-CCNC: 14 U/L (ref 12–45)
BILIRUB SERPL-MCNC: 0.5 MG/DL (ref 0.1–1.5)
BUN SERPL-MCNC: 22 MG/DL (ref 8–22)
CALCIUM ALBUM COR SERPL-MCNC: 9.7 MG/DL (ref 8.5–10.5)
CALCIUM SERPL-MCNC: 9.9 MG/DL (ref 8.5–10.5)
CHLORIDE SERPL-SCNC: 100 MMOL/L (ref 96–112)
CHOLEST SERPL-MCNC: 149 MG/DL (ref 100–199)
CO2 SERPL-SCNC: 22 MMOL/L (ref 20–33)
CREAT SERPL-MCNC: 1.24 MG/DL (ref 0.5–1.4)
ERYTHROCYTE [DISTWIDTH] IN BLOOD BY AUTOMATED COUNT: 44.3 FL (ref 35.9–50)
GFR SERPLBLD CREATININE-BSD FMLA CKD-EPI: 46 ML/MIN/1.73 M 2
GLOBULIN SER CALC-MCNC: 2.8 G/DL (ref 1.9–3.5)
GLUCOSE SERPL-MCNC: 87 MG/DL (ref 65–99)
HCT VFR BLD AUTO: 36.7 % (ref 37–47)
HDLC SERPL-MCNC: 58 MG/DL
HGB BLD-MCNC: 12.5 G/DL (ref 12–16)
LDLC SERPL CALC-MCNC: 64 MG/DL
MCH RBC QN AUTO: 31.6 PG (ref 27–33)
MCHC RBC AUTO-ENTMCNC: 34.1 G/DL (ref 32.2–35.5)
MCV RBC AUTO: 92.7 FL (ref 81.4–97.8)
PLATELET # BLD AUTO: 282 K/UL (ref 164–446)
PMV BLD AUTO: 10.3 FL (ref 9–12.9)
POTASSIUM SERPL-SCNC: 3.6 MMOL/L (ref 3.6–5.5)
PROT SERPL-MCNC: 7.1 G/DL (ref 6–8.2)
RBC # BLD AUTO: 3.96 M/UL (ref 4.2–5.4)
SODIUM SERPL-SCNC: 136 MMOL/L (ref 135–145)
TRIGL SERPL-MCNC: 137 MG/DL (ref 0–149)
TSH SERPL DL<=0.005 MIU/L-ACNC: 0.7 UIU/ML (ref 0.38–5.33)
VIT B12 SERPL-MCNC: 839 PG/ML (ref 211–911)
WBC # BLD AUTO: 6.6 K/UL (ref 4.8–10.8)

## 2024-03-01 PROCEDURE — 36415 COLL VENOUS BLD VENIPUNCTURE: CPT

## 2024-03-01 PROCEDURE — 80061 LIPID PANEL: CPT

## 2024-03-01 PROCEDURE — 80053 COMPREHEN METABOLIC PANEL: CPT

## 2024-03-01 PROCEDURE — 85027 COMPLETE CBC AUTOMATED: CPT

## 2024-03-01 PROCEDURE — 82607 VITAMIN B-12: CPT

## 2024-03-01 PROCEDURE — 82570 ASSAY OF URINE CREATININE: CPT

## 2024-03-01 PROCEDURE — 84443 ASSAY THYROID STIM HORMONE: CPT

## 2024-03-01 PROCEDURE — 82043 UR ALBUMIN QUANTITATIVE: CPT

## 2024-03-02 LAB
CREAT UR-MCNC: 93.02 MG/DL
MICROALBUMIN UR-MCNC: <1.2 MG/DL
MICROALBUMIN/CREAT UR: NORMAL MG/G (ref 0–30)

## 2024-03-05 ENCOUNTER — OFFICE VISIT (OUTPATIENT)
Dept: MEDICAL GROUP | Facility: MEDICAL CENTER | Age: 74
End: 2024-03-05
Payer: MEDICARE

## 2024-03-05 VITALS
RESPIRATION RATE: 16 BRPM | BODY MASS INDEX: 34.41 KG/M2 | OXYGEN SATURATION: 95 % | TEMPERATURE: 97.5 F | HEIGHT: 62 IN | WEIGHT: 187 LBS | SYSTOLIC BLOOD PRESSURE: 124 MMHG | DIASTOLIC BLOOD PRESSURE: 70 MMHG | HEART RATE: 70 BPM

## 2024-03-05 DIAGNOSIS — E66.01 SEVERE OBESITY (BMI 35.0-39.9) WITH COMORBIDITY (HCC): ICD-10-CM

## 2024-03-05 DIAGNOSIS — D64.9 NORMOCYTIC ANEMIA: Chronic | ICD-10-CM

## 2024-03-05 DIAGNOSIS — I10 PRIMARY HYPERTENSION: Chronic | ICD-10-CM

## 2024-03-05 DIAGNOSIS — N18.31 CHRONIC KIDNEY DISEASE, STAGE 3A: ICD-10-CM

## 2024-03-05 DIAGNOSIS — I47.10 SVT (SUPRAVENTRICULAR TACHYCARDIA) (HCC): Chronic | ICD-10-CM

## 2024-03-05 DIAGNOSIS — E78.5 DYSLIPIDEMIA: Chronic | ICD-10-CM

## 2024-03-05 DIAGNOSIS — E03.9 HYPOTHYROIDISM, UNSPECIFIED TYPE: Chronic | ICD-10-CM

## 2024-03-05 PROBLEM — E66.9 OBESITY (BMI 30-39.9): Status: ACTIVE | Noted: 2024-03-05

## 2024-03-05 PROCEDURE — 99214 OFFICE O/P EST MOD 30 MIN: CPT | Performed by: NURSE PRACTITIONER

## 2024-03-05 PROCEDURE — 3074F SYST BP LT 130 MM HG: CPT | Performed by: NURSE PRACTITIONER

## 2024-03-05 PROCEDURE — 3078F DIAST BP <80 MM HG: CPT | Performed by: NURSE PRACTITIONER

## 2024-03-05 RX ORDER — PREDNISOLONE ACETATE 10 MG/ML
SUSPENSION/ DROPS OPHTHALMIC
COMMUNITY
Start: 2024-02-19

## 2024-03-05 ASSESSMENT — FIBROSIS 4 INDEX: FIB4 SCORE: 1.11

## 2024-03-05 ASSESSMENT — PATIENT HEALTH QUESTIONNAIRE - PHQ9: CLINICAL INTERPRETATION OF PHQ2 SCORE: 0

## 2024-03-05 NOTE — PROGRESS NOTES
Subjective:     HPI:     Prince Mazariegos is a 73 y.o. female  presents to discuss:   Chief Complaint   Patient presents with    Follow-Up     Most recent labs reviewed  Just had cataract removed on one eye-pending removal of the other cataract soon.   Working on weight loss-making progress  Had a steroid injection in her R knee-this has improved.       ROS: : see above      Current Outpatient Medications:     prednisoLONE acetate (PRED FORTE) 1 % Suspension, INSTILL 1 DROP TO OPERATIVE EYE(S) 4 TIMES DAILY. START 2 DAYS PRIOR TO SURGERY, Disp: , Rfl:     Calcium-Vitamin D-Vitamin K (VIACTIV PO), Take 2 Tablets by mouth every evening., Disp: , Rfl:     lisinopril (PRINIVIL) 40 MG tablet, Take 1 Tablet by mouth every day., Disp: 90 Tablet, Rfl: 3    hydroCHLOROthiazide (HYDRODIURIL) 25 MG Tab, Take 1 Tablet by mouth every evening., Disp: 90 Tablet, Rfl: 3    levothyroxine (SYNTHROID) 75 MCG Tab, Take 1 Tablet by mouth every day., Disp: 90 Tablet, Rfl: 3    metoprolol SR (TOPROL XL) 50 MG TABLET SR 24 HR, Take 1 Tablet by mouth every day., Disp: 90 Tablet, Rfl: 3    rosuvastatin (CRESTOR) 10 MG Tab, Take 1 Tablet by mouth every evening., Disp: 90 Tablet, Rfl: 3    acetaminophen (TYLENOL) 500 MG Tab, Take 1,000 mg by mouth every 6 hours as needed for Mild Pain. Indications: Pain, Disp: , Rfl:     Cholecalciferol (VITAMIN D) 2000 UNIT Tab, Take 2,000 Units by mouth every evening., Disp: , Rfl:     polyethylene glycol 3350 (MIRALAX) 17 GM/SCOOP Powder, Take 17 g by mouth every day., Disp: , Rfl:     multivitamin (THERAGRAN) Tab, Take 2 Tabs by mouth every evening., Disp: , Rfl:     Fiber Powder, Take 1 Each by mouth every day., Disp: , Rfl:     Allergies   Allergen Reactions    Hydrocodone Hives    Advil [Ibuprofen] Hives    Celebrex [Celecoxib]      Affects kidney Function    Latex Rash    Naproxen Sodium Hives    Other Misc Swelling     Some soap & toothpaste puffs, colored laundry soap dryer sheets    Skelaxin  "[Metaxalone] Hives    Sunscreens Hives       Objective:     Vitals: /70   Pulse 70   Temp 36.4 °C (97.5 °F)   Resp 16   Ht 1.575 m (5' 2\")   Wt 84.8 kg (187 lb)   SpO2 95%   BMI 34.20 kg/m²    General: Alert, pleasant, NAD  HEENT: Normocephalic.  Neck supple.   Respiratory: no distress, no audible wheezing, RR -WNL  Skin: Warm, dry, no rashes.  Extremities: No leg edema. No discoloration  Neurological: No tremors  Psych:  Affect/mood is normal, judgement is good, memory is intact, grooming is appropriate.    Assessment/Plan:      Chronic kidney disease, stage 3a (HCC)  Chronic. Stable. GFR 46.   Avoid NSAIDs  Labs 6 months/follow up  - Basic Metabolic Panel; Future  - MICROALBUMIN CREAT RATIO URINE; Future    Severe obesity (BMI 35.0-39.9) with comorbidity (HCC)  Chronic. Improving with dietary and lifestyle modifications.   - Patient identified as having weight management issue.  Appropriate orders and counseling given.    HTN (hypertension)  Chronic. Stable. Continue Lisinopril 40mg and HCTZ 25mg    Hypothyroid  Chronic. TSH stable. Continue Levothyroxine 75mcg    Dyslipidemia  Chronic. LDL <100. No myalgias.   Continue Rosuvastatin.     Normocytic anemia  Chronic. CBC stable. Monitor.     SVT (supraventricular tachycardia)  Chronic. Stable on Metoprolol for this. Symptoms stable. Followed by Cardiology.        Return in about 6 months (around 9/5/2024) for 6 month follos up/review labs.        Cony CORONA    "

## 2024-04-16 DIAGNOSIS — I10 ESSENTIAL HYPERTENSION: ICD-10-CM

## 2024-04-16 DIAGNOSIS — E78.5 DYSLIPIDEMIA: ICD-10-CM

## 2024-04-16 DIAGNOSIS — E03.4 HYPOTHYROIDISM DUE TO ACQUIRED ATROPHY OF THYROID: ICD-10-CM

## 2024-04-16 NOTE — TELEPHONE ENCOUNTER
Received request via: Pharmacy    Was the patient seen in the last year in this department? Yes    Does the patient have an active prescription (recently filled or refills available) for medication(s) requested? No    Pharmacy Name: [facility] - [facility] [address]     Does the patient have long term Plus and need 100 day supply (blood pressure, diabetes and cholesterol meds only)? Patient does not have SCP

## 2024-04-17 RX ORDER — ROSUVASTATIN CALCIUM 10 MG/1
10 TABLET, COATED ORAL EVERY EVENING
Qty: 90 TABLET | Refills: 3 | Status: SHIPPED | OUTPATIENT
Start: 2024-04-17

## 2024-04-17 RX ORDER — LISINOPRIL 40 MG/1
40 TABLET ORAL
Qty: 90 TABLET | Refills: 3 | Status: SHIPPED | OUTPATIENT
Start: 2024-04-17

## 2024-04-17 RX ORDER — HYDROCHLOROTHIAZIDE 25 MG/1
25 TABLET ORAL EVERY EVENING
Qty: 90 TABLET | Refills: 3 | Status: SHIPPED | OUTPATIENT
Start: 2024-04-17

## 2024-04-17 RX ORDER — LEVOTHYROXINE SODIUM 0.07 MG/1
75 TABLET ORAL
Qty: 90 TABLET | Refills: 3 | Status: SHIPPED | OUTPATIENT
Start: 2024-04-17

## 2024-04-17 RX ORDER — METOPROLOL SUCCINATE 50 MG/1
50 TABLET, EXTENDED RELEASE ORAL
Qty: 90 TABLET | Refills: 3 | Status: SHIPPED | OUTPATIENT
Start: 2024-04-17

## 2024-08-13 ENCOUNTER — TELEPHONE (OUTPATIENT)
Dept: MEDICAL GROUP | Facility: LAB | Age: 74
End: 2024-08-13
Payer: MEDICARE

## 2024-08-13 NOTE — TELEPHONE ENCOUNTER
Pt tested positive for covid and wanted to see about getting prescribed paxlovid. Pt stated she felt pretty bad for about 2 days, but has started feeling better, and she wanted to know how long she needed to stay home

## 2024-08-23 ENCOUNTER — APPOINTMENT (RX ONLY)
Dept: URBAN - METROPOLITAN AREA CLINIC 20 | Facility: CLINIC | Age: 74
Setting detail: DERMATOLOGY
End: 2024-08-23

## 2024-08-23 DIAGNOSIS — L82.1 OTHER SEBORRHEIC KERATOSIS: ICD-10-CM

## 2024-08-23 DIAGNOSIS — L57.0 ACTINIC KERATOSIS: ICD-10-CM

## 2024-08-23 DIAGNOSIS — L57.8 OTHER SKIN CHANGES DUE TO CHRONIC EXPOSURE TO NONIONIZING RADIATION: ICD-10-CM

## 2024-08-23 DIAGNOSIS — L82.0 INFLAMED SEBORRHEIC KERATOSIS: ICD-10-CM

## 2024-08-23 DIAGNOSIS — L81.4 OTHER MELANIN HYPERPIGMENTATION: ICD-10-CM

## 2024-08-23 DIAGNOSIS — D18.0 HEMANGIOMA: ICD-10-CM

## 2024-08-23 DIAGNOSIS — D22 MELANOCYTIC NEVI: ICD-10-CM

## 2024-08-23 PROBLEM — D22.72 MELANOCYTIC NEVI OF LEFT LOWER LIMB, INCLUDING HIP: Status: ACTIVE | Noted: 2024-08-23

## 2024-08-23 PROBLEM — D22.5 MELANOCYTIC NEVI OF TRUNK: Status: ACTIVE | Noted: 2024-08-23

## 2024-08-23 PROBLEM — D18.01 HEMANGIOMA OF SKIN AND SUBCUTANEOUS TISSUE: Status: ACTIVE | Noted: 2024-08-23

## 2024-08-23 PROBLEM — D22.71 MELANOCYTIC NEVI OF RIGHT LOWER LIMB, INCLUDING HIP: Status: ACTIVE | Noted: 2024-08-23

## 2024-08-23 PROBLEM — D22.62 MELANOCYTIC NEVI OF LEFT UPPER LIMB, INCLUDING SHOULDER: Status: ACTIVE | Noted: 2024-08-23

## 2024-08-23 PROBLEM — D22.61 MELANOCYTIC NEVI OF RIGHT UPPER LIMB, INCLUDING SHOULDER: Status: ACTIVE | Noted: 2024-08-23

## 2024-08-23 PROCEDURE — 99213 OFFICE O/P EST LOW 20 MIN: CPT | Mod: 25

## 2024-08-23 PROCEDURE — 17003 DESTRUCT PREMALG LES 2-14: CPT | Mod: 59

## 2024-08-23 PROCEDURE — ? COUNSELING

## 2024-08-23 PROCEDURE — 17110 DESTRUCTION B9 LES UP TO 14: CPT

## 2024-08-23 PROCEDURE — 17000 DESTRUCT PREMALG LESION: CPT | Mod: 59

## 2024-08-23 PROCEDURE — ? LIQUID NITROGEN

## 2024-08-23 ASSESSMENT — LOCATION DETAILED DESCRIPTION DERM
LOCATION DETAILED: LEFT SUPERIOR UPPER BACK
LOCATION DETAILED: RIGHT MID-UPPER BACK
LOCATION DETAILED: LEFT SUPERIOR CRUS OF ANTIHELIX
LOCATION DETAILED: RIGHT PROXIMAL DORSAL FOREARM
LOCATION DETAILED: LEFT SUPERIOR FOREHEAD
LOCATION DETAILED: RIGHT PROXIMAL POSTERIOR UPPER ARM
LOCATION DETAILED: RIGHT DISTAL DORSAL FOREARM
LOCATION DETAILED: RIGHT POPLITEAL SKIN
LOCATION DETAILED: RIGHT RIB CAGE
LOCATION DETAILED: RIGHT INFERIOR CENTRAL MALAR CHEEK
LOCATION DETAILED: LEFT DORSAL SMALL METACARPOPHALANGEAL JOINT
LOCATION DETAILED: LEFT KNEE
LOCATION DETAILED: RIGHT ANTERIOR PROXIMAL THIGH
LOCATION DETAILED: LEFT POPLITEAL SKIN
LOCATION DETAILED: RIGHT PROXIMAL CALF
LOCATION DETAILED: RIGHT SUPERIOR UPPER BACK
LOCATION DETAILED: RIGHT DORSAL INDEX METACARPOPHALANGEAL JOINT
LOCATION DETAILED: LEFT PROXIMAL POSTERIOR UPPER ARM
LOCATION DETAILED: RIGHT SUPERIOR HELIX
LOCATION DETAILED: RIGHT SUPERIOR MEDIAL MIDBACK
LOCATION DETAILED: LEFT DISTAL DORSAL FOREARM
LOCATION DETAILED: LEFT INFERIOR POSTAURICULAR SKIN
LOCATION DETAILED: LEFT DISTAL POSTERIOR UPPER ARM
LOCATION DETAILED: RIGHT DISTAL POSTERIOR UPPER ARM
LOCATION DETAILED: RIGHT INFERIOR MEDIAL UPPER BACK
LOCATION DETAILED: LEFT PROXIMAL CALF
LOCATION DETAILED: RIGHT LATERAL ABDOMEN
LOCATION DETAILED: RIGHT RADIAL DORSAL HAND
LOCATION DETAILED: RIGHT FOREHEAD
LOCATION DETAILED: LEFT RADIAL DORSAL HAND
LOCATION DETAILED: LEFT PROXIMAL DORSAL FOREARM
LOCATION DETAILED: RIGHT INFERIOR ANTERIOR NECK
LOCATION DETAILED: LEFT ANTERIOR PROXIMAL THIGH
LOCATION DETAILED: LEFT SUPERIOR LATERAL UPPER BACK

## 2024-08-23 ASSESSMENT — LOCATION SIMPLE DESCRIPTION DERM
LOCATION SIMPLE: LEFT POSTERIOR UPPER ARM
LOCATION SIMPLE: RIGHT HAND
LOCATION SIMPLE: LEFT UPPER BACK
LOCATION SIMPLE: LEFT FOREHEAD
LOCATION SIMPLE: LEFT THIGH
LOCATION SIMPLE: RIGHT CALF
LOCATION SIMPLE: RIGHT POPLITEAL SKIN
LOCATION SIMPLE: LEFT EAR
LOCATION SIMPLE: RIGHT UPPER BACK
LOCATION SIMPLE: RIGHT THIGH
LOCATION SIMPLE: ABDOMEN
LOCATION SIMPLE: LEFT KNEE
LOCATION SIMPLE: RIGHT FOREHEAD
LOCATION SIMPLE: RIGHT LOWER BACK
LOCATION SIMPLE: RIGHT FOREARM
LOCATION SIMPLE: LEFT CALF
LOCATION SIMPLE: RIGHT ANTERIOR NECK
LOCATION SIMPLE: RIGHT EAR
LOCATION SIMPLE: LEFT HAND
LOCATION SIMPLE: SCALP
LOCATION SIMPLE: RIGHT CHEEK
LOCATION SIMPLE: RIGHT POSTERIOR UPPER ARM
LOCATION SIMPLE: LEFT FOREARM
LOCATION SIMPLE: LEFT POPLITEAL SKIN

## 2024-08-23 ASSESSMENT — LOCATION ZONE DERM
LOCATION ZONE: LEG
LOCATION ZONE: NECK
LOCATION ZONE: TRUNK
LOCATION ZONE: FACE
LOCATION ZONE: HAND
LOCATION ZONE: SCALP
LOCATION ZONE: ARM
LOCATION ZONE: EAR

## 2024-09-04 ENCOUNTER — HOSPITAL ENCOUNTER (OUTPATIENT)
Dept: LAB | Facility: MEDICAL CENTER | Age: 74
End: 2024-09-04
Attending: NURSE PRACTITIONER
Payer: MEDICARE

## 2024-09-04 DIAGNOSIS — N18.31 CHRONIC KIDNEY DISEASE, STAGE 3A: ICD-10-CM

## 2024-09-04 LAB
ANION GAP SERPL CALC-SCNC: 14 MMOL/L (ref 7–16)
BUN SERPL-MCNC: 23 MG/DL (ref 8–22)
CALCIUM SERPL-MCNC: 10.5 MG/DL (ref 8.5–10.5)
CHLORIDE SERPL-SCNC: 96 MMOL/L (ref 96–112)
CO2 SERPL-SCNC: 23 MMOL/L (ref 20–33)
CREAT SERPL-MCNC: 1.14 MG/DL (ref 0.5–1.4)
GFR SERPLBLD CREATININE-BSD FMLA CKD-EPI: 51 ML/MIN/1.73 M 2
GLUCOSE SERPL-MCNC: 101 MG/DL (ref 65–99)
POTASSIUM SERPL-SCNC: 4.1 MMOL/L (ref 3.6–5.5)
SODIUM SERPL-SCNC: 133 MMOL/L (ref 135–145)

## 2024-09-04 PROCEDURE — 82570 ASSAY OF URINE CREATININE: CPT

## 2024-09-04 PROCEDURE — 80048 BASIC METABOLIC PNL TOTAL CA: CPT

## 2024-09-04 PROCEDURE — 82043 UR ALBUMIN QUANTITATIVE: CPT

## 2024-09-04 PROCEDURE — 36415 COLL VENOUS BLD VENIPUNCTURE: CPT

## 2024-09-05 LAB
CREAT UR-MCNC: 43.83 MG/DL
MICROALBUMIN UR-MCNC: <1.2 MG/DL
MICROALBUMIN/CREAT UR: NORMAL MG/G (ref 0–30)

## 2024-09-10 ENCOUNTER — OFFICE VISIT (OUTPATIENT)
Dept: MEDICAL GROUP | Facility: MEDICAL CENTER | Age: 74
End: 2024-09-10
Payer: MEDICARE

## 2024-09-10 VITALS
OXYGEN SATURATION: 97 % | DIASTOLIC BLOOD PRESSURE: 74 MMHG | RESPIRATION RATE: 16 BRPM | SYSTOLIC BLOOD PRESSURE: 122 MMHG | HEART RATE: 68 BPM | WEIGHT: 192 LBS | HEIGHT: 62 IN | TEMPERATURE: 97.6 F | BODY MASS INDEX: 35.33 KG/M2

## 2024-09-10 DIAGNOSIS — E78.5 DYSLIPIDEMIA: ICD-10-CM

## 2024-09-10 DIAGNOSIS — H61.23 BILATERAL IMPACTED CERUMEN: ICD-10-CM

## 2024-09-10 DIAGNOSIS — I10 ESSENTIAL HYPERTENSION: ICD-10-CM

## 2024-09-10 DIAGNOSIS — D64.9 NORMOCYTIC ANEMIA: ICD-10-CM

## 2024-09-10 DIAGNOSIS — R73.02 IGT (IMPAIRED GLUCOSE TOLERANCE): ICD-10-CM

## 2024-09-10 DIAGNOSIS — N18.31 CHRONIC KIDNEY DISEASE, STAGE 3A: ICD-10-CM

## 2024-09-10 DIAGNOSIS — E55.9 VITAMIN D DEFICIENCY: ICD-10-CM

## 2024-09-10 DIAGNOSIS — E03.4 HYPOTHYROIDISM DUE TO ACQUIRED ATROPHY OF THYROID: ICD-10-CM

## 2024-09-10 PROCEDURE — 69210 REMOVE IMPACTED EAR WAX UNI: CPT | Performed by: NURSE PRACTITIONER

## 2024-09-10 PROCEDURE — 3078F DIAST BP <80 MM HG: CPT | Performed by: NURSE PRACTITIONER

## 2024-09-10 PROCEDURE — 3074F SYST BP LT 130 MM HG: CPT | Performed by: NURSE PRACTITIONER

## 2024-09-10 PROCEDURE — 99214 OFFICE O/P EST MOD 30 MIN: CPT | Mod: 25 | Performed by: NURSE PRACTITIONER

## 2024-09-10 RX ORDER — HYDROCHLOROTHIAZIDE 25 MG/1
25 TABLET ORAL EVERY EVENING
Qty: 90 TABLET | Refills: 3 | Status: SHIPPED | OUTPATIENT
Start: 2024-09-10

## 2024-09-10 RX ORDER — ROSUVASTATIN CALCIUM 10 MG/1
10 TABLET, COATED ORAL EVERY EVENING
Qty: 90 TABLET | Refills: 3 | Status: SHIPPED | OUTPATIENT
Start: 2024-09-10

## 2024-09-10 RX ORDER — LEVOTHYROXINE SODIUM 75 UG/1
75 TABLET ORAL
Qty: 90 TABLET | Refills: 3 | Status: SHIPPED | OUTPATIENT
Start: 2024-09-10

## 2024-09-10 RX ORDER — LISINOPRIL 40 MG/1
40 TABLET ORAL
Qty: 90 TABLET | Refills: 3 | Status: SHIPPED | OUTPATIENT
Start: 2024-09-10

## 2024-09-10 RX ORDER — METOPROLOL SUCCINATE 50 MG/1
50 TABLET, EXTENDED RELEASE ORAL
Qty: 90 TABLET | Refills: 3 | Status: SHIPPED | OUTPATIENT
Start: 2024-09-10

## 2024-09-10 ASSESSMENT — FIBROSIS 4 INDEX: FIB4 SCORE: 1.13

## 2024-09-10 NOTE — PROGRESS NOTES
Subjective:     Prince Mazariegos is a 74 y.o. female presents to discuss:   Chief Complaint   Patient presents with    Follow-Up     Verbal consent was acquired by the patient to use [employer] ambient listening note generation during this visit Yes     History of Present Illness  The patient presents for evaluation of multiple medical concerns.    She has been experiencing a cough, which she attributes to smoke exposure. She does not believe she requires an inhaler. There was one instance where she felt breathless and used her brother's inhaler, but this was a singular occurrence. She also reports occasional nasal congestion.    She underwent cataract surgery in both eyes since her last visit. However, she continues to experience dry eyes and uses Systane three times daily, which she finds effective. She was advised to try a new product during her 3-month postoperative checkup, but due to its high cost, she decided against it.    She maintains a high water intake and reports no leg swelling. She experiences leg pain due to pinched nerves. A knee injection provided relief for about 2 weeks. She is considering another injection as the pain originates from her back and radiates down to her knee. She is not interested in surgical intervention. An epidural injection in May 2023 was beneficial. She has undergone imaging studies for her knee and is currently undergoing physical therapy. She uses a knee brace when the pain intensifies. She declined gabapentin due to concerns about kidney function. Her physical therapy regimen includes 2 hours of therapy three times a week, 1.5 hours twice a week, and 1 hour twice a week. She finds the exercises helpful.     She is experiencing issues with her right ear. The problem is intermittent, but occasionally she experiences significant hearing loss in that ear. She believes it could be due to wax buildup, having had similar issues twice before. She has previously had her ears  "irrigated.    She is still trying to lose weight. She is not eating a lot and eats only when she is hungry.    Labs reviewed.     ROS: : see above      Current Outpatient Medications:     hydroCHLOROthiazide 25 MG Tab, Take 1 Tablet by mouth every evening., Disp: 90 Tablet, Rfl: 3    levothyroxine (SYNTHROID) 75 MCG Tab, Take 1 Tablet by mouth every day., Disp: 90 Tablet, Rfl: 3    lisinopril (PRINIVIL) 40 MG tablet, Take 1 Tablet by mouth every day., Disp: 90 Tablet, Rfl: 3    metoprolol SR (TOPROL XL) 50 MG TABLET SR 24 HR, Take 1 Tablet by mouth every day., Disp: 90 Tablet, Rfl: 3    rosuvastatin (CRESTOR) 10 MG Tab, Take 1 Tablet by mouth every evening., Disp: 90 Tablet, Rfl: 3    Calcium-Vitamin D-Vitamin K (VIACTIV PO), Take 2 Tablets by mouth every evening., Disp: , Rfl:     acetaminophen (TYLENOL) 500 MG Tab, Take 1,000 mg by mouth every 6 hours as needed for Mild Pain. Indications: Pain, Disp: , Rfl:     Cholecalciferol (VITAMIN D) 2000 UNIT Tab, Take 2,000 Units by mouth every evening., Disp: , Rfl:     polyethylene glycol 3350 (MIRALAX) 17 GM/SCOOP Powder, Take 17 g by mouth every day., Disp: , Rfl:     multivitamin (THERAGRAN) Tab, Take 2 Tabs by mouth every evening., Disp: , Rfl:     Fiber Powder, Take 1 Each by mouth every day., Disp: , Rfl:     prednisoLONE acetate (PRED FORTE) 1 % Suspension, INSTILL 1 DROP TO OPERATIVE EYE(S) 4 TIMES DAILY. START 2 DAYS PRIOR TO SURGERY, Disp: , Rfl:     Allergies   Allergen Reactions    Hydrocodone Hives    Advil [Ibuprofen] Hives    Celebrex [Celecoxib]      Affects kidney Function    Latex Rash    Naproxen Sodium Hives    Other Misc Swelling     Some soap & toothpaste puffs, colored laundry soap dryer sheets    Skelaxin [Metaxalone] Hives    Sunscreens Hives       Objective:     Vitals: /74   Pulse 68   Temp 36.4 °C (97.6 °F)   Resp 16   Ht 1.575 m (5' 2\")   Wt 87.1 kg (192 lb)   SpO2 97%   BMI 35.12 kg/m²    General: Alert, pleasant, NAD  HEENT: " Normocephalic.  Neck supple.  Bilateral impacted cerumen, after successful irrigation and curette expulsion of wax bilateral TM intact.  Respiratory: no distress, no audible wheezing, RR -WNL  Skin: Warm, dry, no rashes.  Extremities: No leg edema. No discoloration  Neurological: No tremors  Psych:  Affect/mood is normal, judgement is good, memory is intact, grooming is appropriate.      Assessment/Plan:      Assessment & Plan  1. Cough and nasal congestion.  The cough and nasal congestion could be attributed to environmental factors such as smoke exposure or barometric pressure changes. Salt water gargling and nasal saline were recommended for symptom relief.    2. Dry eyes.  Continuation of Systane was advised. She uses it three times a day and finds it effective.    3. Right ear wax impaction.  She opted for immediate irrigation.    4. Chronic knee pain.  She experiences chronic knee pain due to bone-on-bone contact. She has been receiving knee injections, which provide relief for about 2 weeks. She plans to schedule another knee injection. Physical therapy is ongoing, and she uses a knee brace when the pain is severe.      1. Essential hypertension  Chronic. Stable. Continue Lisinopril 40mg and HCTZ 25mg  Due for routine labs prior to next office visit.  Follow-up 6 months.  - MICROALBUMIN CREAT RATIO URINE; Future  - hydroCHLOROthiazide 25 MG Tab; Take 1 Tablet by mouth every evening.  Dispense: 90 Tablet; Refill: 3  - lisinopril (PRINIVIL) 40 MG tablet; Take 1 Tablet by mouth every day.  Dispense: 90 Tablet; Refill: 3  - metoprolol SR (TOPROL XL) 50 MG TABLET SR 24 HR; Take 1 Tablet by mouth every day.  Dispense: 90 Tablet; Refill: 3    2. Hypothyroidism due to acquired atrophy of thyroid  Chronic. TSH stable. Continue Levothyroxine 75mcg  Due for routine labs prior to next office visit.  - TSH; Future  - levothyroxine (SYNTHROID) 75 MCG Tab; Take 1 Tablet by mouth every day.  Dispense: 90 Tablet; Refill:  3    3. Dyslipidemia  - TSH; Future  - Lipid Profile; Future  - rosuvastatin (CRESTOR) 10 MG Tab; Take 1 Tablet by mouth every evening.  Dispense: 90 Tablet; Refill: 3    4. Chronic kidney disease, stage 3a  Chronic. Stable. GFR 51   Avoid NSAIDs  Labs 6 months/follow up  - Comp Metabolic Panel; Future  - MICROALBUMIN CREAT RATIO URINE; Future    5. Vitamin D deficiency  - VITAMIN D,25 HYDROXY (DEFICIENCY); Future    6. Normocytic anemia  Chronic. CBC stable. Monitor.   - VITAMIN B12; Future  - IRON/TOTAL IRON BIND; Future  - CBC WITH DIFFERENTIAL; Future    7. IGT (impaired glucose tolerance)  - HEMOGLOBIN A1C; Future    8. Bilateral impacted cerumen  Successful irrigation.  I personally used a curette with a touch light to remove impacted cerumen from left EAC.    Return in about 6 months (around 3/10/2025) for Lab results.    {I have placed the above orders and discussed them with an approved delegating provider. The MA is performing the below orders under the direction of Dr. Memo CORONA

## 2024-11-13 ENCOUNTER — TELEPHONE (OUTPATIENT)
Dept: SLEEP MEDICINE | Facility: MEDICAL CENTER | Age: 74
End: 2024-11-13
Payer: MEDICARE

## 2024-11-14 ENCOUNTER — OFFICE VISIT (OUTPATIENT)
Dept: SLEEP MEDICINE | Facility: MEDICAL CENTER | Age: 74
End: 2024-11-14
Attending: NURSE PRACTITIONER
Payer: MEDICARE

## 2024-11-14 VITALS
OXYGEN SATURATION: 94 % | DIASTOLIC BLOOD PRESSURE: 66 MMHG | SYSTOLIC BLOOD PRESSURE: 122 MMHG | RESPIRATION RATE: 16 BRPM | WEIGHT: 190 LBS | HEIGHT: 62 IN | BODY MASS INDEX: 34.96 KG/M2 | HEART RATE: 77 BPM

## 2024-11-14 DIAGNOSIS — G47.33 OSA (OBSTRUCTIVE SLEEP APNEA): ICD-10-CM

## 2024-11-14 PROCEDURE — 3074F SYST BP LT 130 MM HG: CPT | Performed by: NURSE PRACTITIONER

## 2024-11-14 PROCEDURE — 99213 OFFICE O/P EST LOW 20 MIN: CPT | Performed by: NURSE PRACTITIONER

## 2024-11-14 PROCEDURE — 99214 OFFICE O/P EST MOD 30 MIN: CPT | Performed by: NURSE PRACTITIONER

## 2024-11-14 PROCEDURE — 3078F DIAST BP <80 MM HG: CPT | Performed by: NURSE PRACTITIONER

## 2024-11-14 ASSESSMENT — FIBROSIS 4 INDEX: FIB4 SCORE: 1.13

## 2024-11-14 NOTE — PROGRESS NOTES
Chief Complaint   Patient presents with    Apnea     Last Office Visit 6/27/23 with RUSSEL Miller.    PAP/O2/OAT: CPAP @ 10cm H20 nightly       HPI:  Prince Mazariegos is a 74 y.o. year old female here today for follow-up on GENIA.  Annual visit.  Last seen 6/27/2023. Currently using CPAP @ 10cm H20 nightly; RESMED; device obtained 12/2017.  OPO completed 8/16/2023 showed only3.2 minutes less than or equal to 88% SpO2 on CPAP.    Currently using a hybrid fullface mask.  Denies any difficulty with mask fit or pressures.  Denies any difficulty falling or staying asleep.  Gets approximately 10 hours of sleep.  Notes improvement in sleep quality.  Denies any excessive daytime sleepiness, morning headaches, palpitations, concentration or memory problems.    30-day compliance reviewed with patient shows 100% use with an average time of 2 hours and 1 minute and a residual AHI of 1.4.  No evidence of mask leak.      Sleep hx:  HSS from June 2016 showed an AHI of 12.2 with low oxygenation of 76%.  Currently she is being treated with CPAP @ [id number]. Current device obtained 2017.    ROS: As per HPI and otherwise negative if not stated.    Past Medical History:   Diagnosis Date    Anesthesia     PONV    Arrhythmia     Arthritis     osteo neck right knee back right shoulder    Back pain     Breath shortness     related to pain    Chest pain May 2016    PET scan with no infarct or ischemia, normal LVEF.    Chickenpox     DDD (degenerative disc disease), cervical     Disorder of thyroid     Enlarged tonsils     Removed with adenoids    GERD (gastroesophageal reflux disease)     Wolof measles     Hemorrhoids     bleeding    High cholesterol     Hypertension     pt states well controlled on meds    Obesity     Pain     right shoulder/ torn rotator cuff    PONV (postoperative nausea and vomiting)     Psychiatric disorder     Anxiety  not taking any medications    Skin cancer     skin    Sleep apnea     uses cpap     "Snoring        Past Surgical History:   Procedure Laterality Date    PB RECONSTR TOTAL SHOULDER IMPLANT Right 8/14/2019    Procedure: ARTHROPLASTY, SHOULDER, TOTAL- REVERSE;  Surgeon: Parish Garcia M.D.;  Location: SURGERY [facility];  Service: Orthopedics    CARPAL TUNNEL ENDOSCOPIC  10/28/2011    Performed by EDGAR GARCIA at SURGERY SAME DAY [facility] ORS    CARPAL TUNNEL ENDOSCOPIC  10/17/2011    Performed by EDGRA GARCIA at SURGERY SAME DAY [facility] ORS    HYSTEROSCOPY WITH VIDEO DIAGNOSTIC  10/7/08    Performed by MICHELINE WEBER at SURGERY SAME DAY [facility] ORS    DILATION AND CURETTAGE  10/7/08    Performed by MICHELINE WEBER at SURGERY SAME DAY [facility] ORS    LUMPECTOMY Right 2007    Breast lump removed    CARPAL TUNNEL RELEASE      OTHER      hemorrhoid banding    OTHER CARDIAC SURGERY      Cath - 4-5 years ago with challapalli    TONSILLECTOMY         Family History   Problem Relation Age of Onset    Cancer Mother         Breast cancer    Cancer Sister         Breast cancer    Cancer Other     Cancer Paternal Grandfather         liver    Cancer Father         Pancreatic cancer    Cancer Maternal Aunt     Heart Disease Neg Hx        Allergies as of 11/14/2024 - Reviewed 11/14/2024   Allergen Reaction Noted    Hydrocodone Hives 04/04/2023    Advil [ibuprofen] Hives 04/04/2023    Celebrex [celecoxib]  04/04/2023    Latex Rash 10/12/2011    Naproxen sodium Hives 06/15/2008    Other misc Swelling 10/12/2011    Skelaxin [metaxalone] Hives 06/15/2008    Sunscreens Hives 09/16/2015        Vitals:  /66 (BP Location: Left arm, Patient Position: Sitting, BP Cuff Size: Adult)   Pulse 77   Resp 16   Ht 1.575 m (5' 2\")   Wt 86.2 kg (190 lb)   SpO2 94%     Current medications as of today   Current Outpatient Medications   Medication Sig Dispense Refill    hydroCHLOROthiazide 25 MG Tab Take 1 Tablet by mouth every evening. 90 Tablet 3    levothyroxine (SYNTHROID) 75 MCG Tab " Take 1 Tablet by mouth every day. 90 Tablet 3    lisinopril (PRINIVIL) 40 MG tablet Take 1 Tablet by mouth every day. 90 Tablet 3    metoprolol SR (TOPROL XL) 50 MG TABLET SR 24 HR Take 1 Tablet by mouth every day. 90 Tablet 3    rosuvastatin (CRESTOR) 10 MG Tab Take 1 Tablet by mouth every evening. 90 Tablet 3    prednisoLONE acetate (PRED FORTE) 1 % Suspension INSTILL 1 DROP TO OPERATIVE EYE(S) 4 TIMES DAILY. START 2 DAYS PRIOR TO SURGERY      Calcium-Vitamin D-Vitamin K (VIACTIV PO) Take 2 Tablets by mouth every evening.      acetaminophen (TYLENOL) 500 MG Tab Take 1,000 mg by mouth every 6 hours as needed for Mild Pain. Indications: Pain      Cholecalciferol (VITAMIN D) 2000 UNIT Tab Take 2,000 Units by mouth every evening.      polyethylene glycol 3350 (MIRALAX) 17 GM/SCOOP Powder Take 17 g by mouth every day.      multivitamin (THERAGRAN) Tab Take 2 Tabs by mouth every evening.      Fiber Powder Take 1 Each by mouth every day.       No current facility-administered medications for this visit.         Physical Exam:   Gen:           Alert and oriented, No apparent distress. Mood and affect appropriate, normal interaction with examiner.  Eyes:          PERRL, EOM intact, sclere white, conjunctive moist.  Ears:          Not examined.   Hearing:     Grossly intact.  Nose:          Normal, no lesions or deformities.  Dentition:    Good dentition.  Oropharynx:   Tongue normal, posterior pharynx without erythema or exudate.  Neck:        Supple, trachea midline, no masses.  Respiratory Effort: No intercostal retractions or use of accessory muscles.   Lung Auscultation:      Clear to auscultation bilaterally; no rales, rhonchi or wheezing.  CV:            Regular rate and rhythm. No murmurs, rubs or gallops.  Abd:           Not examined.   Lymphadenopathy: Not examined.  Gait and Station: Normal.  Digits and Nails: No clubbing, cyanosis, petechiae, or nodes.   Cranial Nerves: II-XII grossly intact.  Skin:        No  rashes, lesions or ulcers noted.               Ext:           No cyanosis or edema.      Assessment:  1. GENIA (obstructive sleep apnea)  DME Mask and Supplies          Plan:  Using and benefiting from CPAP therapy.  Compliance shows adequate use and control of GENIA.  Order placed for mask and supplies which will be good for 1 year.  Advise annual follow-up.    Please note that this dictation was created using voice recognition software. I have made every reasonable attempt to correct obvious errors, but it is possible there are errors of grammar and possibly content that I did not discover before finalizing the note.

## 2025-01-15 ENCOUNTER — OFFICE VISIT (OUTPATIENT)
Dept: MEDICAL GROUP | Facility: MEDICAL CENTER | Age: 75
End: 2025-01-15
Payer: MEDICARE

## 2025-01-15 VITALS
WEIGHT: 195 LBS | OXYGEN SATURATION: 98 % | HEART RATE: 82 BPM | HEIGHT: 62 IN | TEMPERATURE: 97.3 F | DIASTOLIC BLOOD PRESSURE: 76 MMHG | BODY MASS INDEX: 35.88 KG/M2 | SYSTOLIC BLOOD PRESSURE: 122 MMHG

## 2025-01-15 DIAGNOSIS — M25.561 CHRONIC PAIN OF RIGHT KNEE: ICD-10-CM

## 2025-01-15 DIAGNOSIS — R10.9 LEFT FLANK PAIN: ICD-10-CM

## 2025-01-15 DIAGNOSIS — G89.29 CHRONIC PAIN OF RIGHT KNEE: ICD-10-CM

## 2025-01-15 DIAGNOSIS — M54.50 CHRONIC MIDLINE LOW BACK PAIN WITHOUT SCIATICA: ICD-10-CM

## 2025-01-15 DIAGNOSIS — G89.29 CHRONIC MIDLINE LOW BACK PAIN WITHOUT SCIATICA: ICD-10-CM

## 2025-01-15 PROCEDURE — 99213 OFFICE O/P EST LOW 20 MIN: CPT | Performed by: FAMILY MEDICINE

## 2025-01-15 PROCEDURE — 3074F SYST BP LT 130 MM HG: CPT | Performed by: FAMILY MEDICINE

## 2025-01-15 PROCEDURE — 3078F DIAST BP <80 MM HG: CPT | Performed by: FAMILY MEDICINE

## 2025-01-15 ASSESSMENT — PATIENT HEALTH QUESTIONNAIRE - PHQ9: CLINICAL INTERPRETATION OF PHQ2 SCORE: 0

## 2025-01-15 ASSESSMENT — FIBROSIS 4 INDEX: FIB4 SCORE: 1.13

## 2025-01-15 NOTE — PROGRESS NOTES
"Verbal consent was acquired by the patient to use [employer] ambient listening note generation during this visit    Subjective:     CC: \"left side pain\"    History of Present Illness  The patient presents for left-sided pain.    She reports experiencing left-sided pain, which she suspects may be due to a muscle strain incurred while moving objects approximately 1 month ago. The pain is described as sharp and transient, particularly noticeable when she changes position. It is not constant and does not interfere with her daily activities, but it is more pronounced at night. She has attempted to manage the pain with Tylenol and CBD products, but these have not provided relief. She has also tried using a heating pad on her knee and buttock, where she has a pinched nerve, but this has not alleviated the pain. She has been unable to perform her usual shoulder and back exercises due to fear of exacerbating the pain. She has been doing exercises for over 7 years. She has been doing some exercises for her legs. She has not tried tizanidine or cyclobenzaprine. She has previously found physical therapy beneficial and is interested in pursuing it again. She has not tried lidocaine patches or creams. She reports no urinary symptoms such as dysuria, hematuria, nausea, vomiting, or changes in bowel habits such as constipation, diarrhea, or hematochezia. She maintains adequate hydration by consuming 64 ounces of water daily. She reports no respiratory distress or skin changes such as bruising or rashes.    Her sleep has been disrupted due to the inability to lie on her left side, compounded by pre-existing conditions including sleep apnea, cervical disc disease necessitating a flat pillow, a pinched nerve radiating down her right leg, and a history of shoulder replacement. Despite these challenges, she managed to achieve a full night's sleep last night, a significant improvement from her usual 4-hour sleep duration.    She " "experiences occasional gas, which can be painful, but this is managed with Tums before bedtime. The gas pain is distinct from her left-sided pain.    ALLERGIES  The patient has had hives with ANTI-INFLAMMATORIES like ADVIL and CELEBREX. The patient has had hives with SKELAXIN (metaxalone).    MEDICATIONS  Current: Tylenol, CBD oil.          Objective:     Exam:  /76   Pulse 82   Temp 36.3 °C (97.3 °F) (Temporal)   Ht 1.575 m (5' 2\")   Wt 88.5 kg (195 lb)   SpO2 98%   BMI 35.67 kg/m²  Body mass index is 35.67 kg/m².    Physical Exam  Vitals reviewed.   Constitutional:       Appearance: Normal appearance.   HENT:      Head: Normocephalic and atraumatic.   Cardiovascular:      Rate and Rhythm: Normal rate and regular rhythm.      Heart sounds: Normal heart sounds.   Pulmonary:      Effort: Pulmonary effort is normal. No respiratory distress.      Breath sounds: Normal breath sounds.   Abdominal:      Tenderness: There is no left CVA tenderness.   Skin:     General: Skin is warm and dry.      Findings: No bruising, lesion or rash.   Neurological:      Mental Status: She is alert. Mental status is at baseline.      Gait: Gait normal.   Psychiatric:         Mood and Affect: Mood normal.         Behavior: Behavior normal.           Results        Assessment & Plan:       1. Left flank pain  - Referral to Physical Therapy    2. Chronic midline low back pain without sciatica  - Referral to Physical Therapy    3. Chronic pain of right knee  - Referral to Physical Therapy      Assessment & Plan  1. Left-sided pain.  The patient reports left-sided pain that began about a month ago, likely due to moving objects. The pain is sharp and worsens at night, disrupting her sleep. She has tried Tylenol and CBD products without relief. Muscle relaxants and anti-inflammatories cause hives, and she prefers to avoid opioids. She is advised to apply heat to the affected area and consider using over-the-counter lidocaine patches " 4% once daily. Prescription strength lidocaine patches 5% are also an option if needed. A referral to physical therapy at [facility] has been initiated to address her pain and potentially adjust her exercise routine.    2. Sleep disturbance.  The patient's sleep is disrupted due to her left-sided pain and other underlying conditions, including sleep apnea and disc problems. She is advised to continue using a flat pillow to support her neck and to apply heat to the affected area before bed. The use of lidocaine patches may also help alleviate nighttime pain.    3. Gas pain.  The patient experiences gas pain, which is sometimes relieved by taking Tums before bed. She is advised to continue this practice as needed.    PROCEDURE  The patient has a history of shoulder replacement.         Return if symptoms worsen or fail to improve.      This note was created using voice recognition software (Dragon). The accuracy of the dictation is limited by the abilities of the software. I have reviewed the note prior to signing, however some errors in grammar and context are still possible. If you have any questions related to this note please do not hesitate to contact our office.

## 2025-02-21 ENCOUNTER — APPOINTMENT (OUTPATIENT)
Dept: URBAN - METROPOLITAN AREA CLINIC 20 | Facility: CLINIC | Age: 75
Setting detail: DERMATOLOGY
End: 2025-02-21

## 2025-02-21 DIAGNOSIS — D18.0 HEMANGIOMA: ICD-10-CM

## 2025-02-21 DIAGNOSIS — L81.4 OTHER MELANIN HYPERPIGMENTATION: ICD-10-CM

## 2025-02-21 DIAGNOSIS — D22 MELANOCYTIC NEVI: ICD-10-CM

## 2025-02-21 DIAGNOSIS — L57.8 OTHER SKIN CHANGES DUE TO CHRONIC EXPOSURE TO NONIONIZING RADIATION: ICD-10-CM

## 2025-02-21 DIAGNOSIS — L57.0 ACTINIC KERATOSIS: ICD-10-CM

## 2025-02-21 DIAGNOSIS — L30.9 DERMATITIS, UNSPECIFIED: ICD-10-CM

## 2025-02-21 DIAGNOSIS — L82.1 OTHER SEBORRHEIC KERATOSIS: ICD-10-CM

## 2025-02-21 PROBLEM — D22.62 MELANOCYTIC NEVI OF LEFT UPPER LIMB, INCLUDING SHOULDER: Status: ACTIVE | Noted: 2025-02-21

## 2025-02-21 PROBLEM — D22.5 MELANOCYTIC NEVI OF TRUNK: Status: ACTIVE | Noted: 2025-02-21

## 2025-02-21 PROBLEM — D22.72 MELANOCYTIC NEVI OF LEFT LOWER LIMB, INCLUDING HIP: Status: ACTIVE | Noted: 2025-02-21

## 2025-02-21 PROBLEM — D22.71 MELANOCYTIC NEVI OF RIGHT LOWER LIMB, INCLUDING HIP: Status: ACTIVE | Noted: 2025-02-21

## 2025-02-21 PROBLEM — D22.61 MELANOCYTIC NEVI OF RIGHT UPPER LIMB, INCLUDING SHOULDER: Status: ACTIVE | Noted: 2025-02-21

## 2025-02-21 PROBLEM — D18.01 HEMANGIOMA OF SKIN AND SUBCUTANEOUS TISSUE: Status: ACTIVE | Noted: 2025-02-21

## 2025-02-21 PROCEDURE — 99213 OFFICE O/P EST LOW 20 MIN: CPT | Mod: 25

## 2025-02-21 PROCEDURE — ? COUNSELING

## 2025-02-21 PROCEDURE — ? LIQUID NITROGEN

## 2025-02-21 PROCEDURE — 17000 DESTRUCT PREMALG LESION: CPT

## 2025-02-21 PROCEDURE — ? ADDITIONAL NOTES

## 2025-02-21 PROCEDURE — 17003 DESTRUCT PREMALG LES 2-14: CPT

## 2025-02-21 ASSESSMENT — LOCATION DETAILED DESCRIPTION DERM
LOCATION DETAILED: RIGHT INFERIOR ANTERIOR NECK
LOCATION DETAILED: RIGHT PROXIMAL POSTERIOR UPPER ARM
LOCATION DETAILED: RIGHT RADIAL DORSAL HAND
LOCATION DETAILED: RIGHT SUPERIOR MEDIAL MIDBACK
LOCATION DETAILED: RIGHT RIB CAGE
LOCATION DETAILED: LEFT SUPERIOR LATERAL UPPER BACK
LOCATION DETAILED: RIGHT POPLITEAL SKIN
LOCATION DETAILED: LEFT INFERIOR CENTRAL MALAR CHEEK
LOCATION DETAILED: RIGHT DISTAL DORSAL FOREARM
LOCATION DETAILED: LEFT SUPERIOR UPPER BACK
LOCATION DETAILED: RIGHT PROXIMAL CALF
LOCATION DETAILED: RIGHT DISTAL POSTERIOR UPPER ARM
LOCATION DETAILED: LEFT DISTAL POSTERIOR UPPER ARM
LOCATION DETAILED: RIGHT CENTRAL BUCCAL CHEEK
LOCATION DETAILED: RIGHT ANTERIOR PROXIMAL THIGH
LOCATION DETAILED: LEFT INFERIOR POSTAURICULAR SKIN
LOCATION DETAILED: LEFT POPLITEAL SKIN
LOCATION DETAILED: RIGHT DORSAL INDEX METACARPOPHALANGEAL JOINT
LOCATION DETAILED: LEFT ANTERIOR PROXIMAL THIGH
LOCATION DETAILED: RIGHT LATERAL ABDOMEN
LOCATION DETAILED: RIGHT SUPERIOR HELIX
LOCATION DETAILED: RIGHT INFERIOR CENTRAL MALAR CHEEK
LOCATION DETAILED: LEFT PROXIMAL POSTERIOR UPPER ARM
LOCATION DETAILED: RIGHT PROXIMAL DORSAL FOREARM
LOCATION DETAILED: LEFT KNEE
LOCATION DETAILED: RIGHT SUPERIOR UPPER BACK
LOCATION DETAILED: LEFT DORSAL SMALL METACARPOPHALANGEAL JOINT
LOCATION DETAILED: LEFT DISTAL DORSAL FOREARM
LOCATION DETAILED: LEFT SUPERIOR CRUS OF ANTIHELIX
LOCATION DETAILED: RIGHT MID-UPPER BACK
LOCATION DETAILED: RIGHT VENTRAL PROXIMAL FOREARM
LOCATION DETAILED: LEFT RADIAL DORSAL HAND
LOCATION DETAILED: LEFT PROXIMAL CALF
LOCATION DETAILED: LEFT PROXIMAL DORSAL FOREARM
LOCATION DETAILED: RIGHT INFERIOR MEDIAL UPPER BACK

## 2025-02-21 ASSESSMENT — LOCATION SIMPLE DESCRIPTION DERM
LOCATION SIMPLE: RIGHT CALF
LOCATION SIMPLE: RIGHT THIGH
LOCATION SIMPLE: LEFT KNEE
LOCATION SIMPLE: RIGHT POSTERIOR UPPER ARM
LOCATION SIMPLE: LEFT THIGH
LOCATION SIMPLE: RIGHT UPPER BACK
LOCATION SIMPLE: RIGHT POPLITEAL SKIN
LOCATION SIMPLE: ABDOMEN
LOCATION SIMPLE: LEFT POPLITEAL SKIN
LOCATION SIMPLE: RIGHT CHEEK
LOCATION SIMPLE: LEFT FOREARM
LOCATION SIMPLE: LEFT POSTERIOR UPPER ARM
LOCATION SIMPLE: LEFT UPPER BACK
LOCATION SIMPLE: LEFT CHEEK
LOCATION SIMPLE: RIGHT HAND
LOCATION SIMPLE: RIGHT LOWER BACK
LOCATION SIMPLE: RIGHT EAR
LOCATION SIMPLE: LEFT HAND
LOCATION SIMPLE: RIGHT ANTERIOR NECK
LOCATION SIMPLE: SCALP
LOCATION SIMPLE: LEFT EAR
LOCATION SIMPLE: LEFT CALF
LOCATION SIMPLE: RIGHT FOREARM

## 2025-02-21 ASSESSMENT — LOCATION ZONE DERM
LOCATION ZONE: ARM
LOCATION ZONE: TRUNK
LOCATION ZONE: EAR
LOCATION ZONE: HAND
LOCATION ZONE: NECK
LOCATION ZONE: FACE
LOCATION ZONE: LEG
LOCATION ZONE: SCALP

## 2025-02-21 NOTE — PROCEDURE: ADDITIONAL NOTES
Detail Level: Simple
Render Risk Assessment In Note?: no
Additional Notes: Will contact office if this does not resolve

## 2025-03-11 ENCOUNTER — HOSPITAL ENCOUNTER (OUTPATIENT)
Facility: MEDICAL CENTER | Age: 75
End: 2025-03-11
Attending: NURSE PRACTITIONER
Payer: MEDICARE

## 2025-03-11 DIAGNOSIS — R73.02 IGT (IMPAIRED GLUCOSE TOLERANCE): ICD-10-CM

## 2025-03-11 DIAGNOSIS — N18.31 CHRONIC KIDNEY DISEASE, STAGE 3A: ICD-10-CM

## 2025-03-11 DIAGNOSIS — E03.4 HYPOTHYROIDISM DUE TO ACQUIRED ATROPHY OF THYROID: ICD-10-CM

## 2025-03-11 DIAGNOSIS — E78.5 DYSLIPIDEMIA: ICD-10-CM

## 2025-03-11 DIAGNOSIS — D64.9 NORMOCYTIC ANEMIA: ICD-10-CM

## 2025-03-11 DIAGNOSIS — I10 ESSENTIAL HYPERTENSION: ICD-10-CM

## 2025-03-11 DIAGNOSIS — E55.9 VITAMIN D DEFICIENCY: ICD-10-CM

## 2025-03-11 LAB
25(OH)D3 SERPL-MCNC: 58 NG/ML (ref 30–100)
ALBUMIN SERPL BCP-MCNC: 4.3 G/DL (ref 3.2–4.9)
ALBUMIN/GLOB SERPL: 2 G/DL
ALP SERPL-CCNC: 87 U/L (ref 30–99)
ALT SERPL-CCNC: 15 U/L (ref 2–50)
ANION GAP SERPL CALC-SCNC: 13 MMOL/L (ref 7–16)
AST SERPL-CCNC: 20 U/L (ref 12–45)
BASOPHILS # BLD AUTO: 0.6 % (ref 0–1.8)
BASOPHILS # BLD: 0.04 K/UL (ref 0–0.12)
BILIRUB SERPL-MCNC: 0.6 MG/DL (ref 0.1–1.5)
BUN SERPL-MCNC: 19 MG/DL (ref 8–22)
CALCIUM ALBUM COR SERPL-MCNC: 9.6 MG/DL (ref 8.5–10.5)
CALCIUM SERPL-MCNC: 9.8 MG/DL (ref 8.4–10.2)
CHLORIDE SERPL-SCNC: 94 MMOL/L (ref 96–112)
CHOLEST SERPL-MCNC: 147 MG/DL (ref 100–199)
CO2 SERPL-SCNC: 23 MMOL/L (ref 20–33)
CREAT SERPL-MCNC: 1.14 MG/DL (ref 0.5–1.4)
EOSINOPHIL # BLD AUTO: 0.03 K/UL (ref 0–0.51)
EOSINOPHIL NFR BLD: 0.4 % (ref 0–6.9)
ERYTHROCYTE [DISTWIDTH] IN BLOOD BY AUTOMATED COUNT: 41.8 FL (ref 35.9–50)
EST. AVERAGE GLUCOSE BLD GHB EST-MCNC: 108 MG/DL
FASTING STATUS PATIENT QL REPORTED: NORMAL
GFR SERPLBLD CREATININE-BSD FMLA CKD-EPI: 51 ML/MIN/1.73 M 2
GLOBULIN SER CALC-MCNC: 2.2 G/DL (ref 1.9–3.5)
GLUCOSE SERPL-MCNC: 94 MG/DL (ref 65–99)
HBA1C MFR BLD: 5.4 % (ref 4–5.6)
HCT VFR BLD AUTO: 35.5 % (ref 37–47)
HDLC SERPL-MCNC: 69 MG/DL
HGB BLD-MCNC: 12.1 G/DL (ref 12–16)
IMM GRANULOCYTES # BLD AUTO: 0.05 K/UL (ref 0–0.11)
IMM GRANULOCYTES NFR BLD AUTO: 0.7 % (ref 0–0.9)
IRON SATN MFR SERPL: 46 % (ref 15–55)
IRON SERPL-MCNC: 136 UG/DL (ref 40–170)
LDLC SERPL CALC-MCNC: 57 MG/DL
LYMPHOCYTES # BLD AUTO: 1.09 K/UL (ref 1–4.8)
LYMPHOCYTES NFR BLD: 15 % (ref 22–41)
MCH RBC QN AUTO: 31.7 PG (ref 27–33)
MCHC RBC AUTO-ENTMCNC: 34.1 G/DL (ref 32.2–35.5)
MCV RBC AUTO: 92.9 FL (ref 81.4–97.8)
MONOCYTES # BLD AUTO: 0.63 K/UL (ref 0–0.85)
MONOCYTES NFR BLD AUTO: 8.7 % (ref 0–13.4)
NEUTROPHILS # BLD AUTO: 5.43 K/UL (ref 1.82–7.42)
NEUTROPHILS NFR BLD: 74.6 % (ref 44–72)
NRBC # BLD AUTO: 0 K/UL
NRBC BLD-RTO: 0 /100 WBC (ref 0–0.2)
PLATELET # BLD AUTO: 271 K/UL (ref 164–446)
PMV BLD AUTO: 9.2 FL (ref 9–12.9)
POTASSIUM SERPL-SCNC: 3.7 MMOL/L (ref 3.6–5.5)
PROT SERPL-MCNC: 6.5 G/DL (ref 6–8.2)
RBC # BLD AUTO: 3.82 M/UL (ref 4.2–5.4)
SODIUM SERPL-SCNC: 130 MMOL/L (ref 135–145)
TIBC SERPL-MCNC: 298 UG/DL (ref 250–450)
TRIGL SERPL-MCNC: 106 MG/DL (ref 0–149)
TSH SERPL DL<=0.005 MIU/L-ACNC: 1.11 UIU/ML (ref 0.38–5.33)
UIBC SERPL-MCNC: 162 UG/DL (ref 110–370)
VIT B12 SERPL-MCNC: 938 PG/ML (ref 211–911)
WBC # BLD AUTO: 7.3 K/UL (ref 4.8–10.8)

## 2025-03-11 PROCEDURE — 80061 LIPID PANEL: CPT

## 2025-03-11 PROCEDURE — 85025 COMPLETE CBC W/AUTO DIFF WBC: CPT

## 2025-03-11 PROCEDURE — 82043 UR ALBUMIN QUANTITATIVE: CPT

## 2025-03-11 PROCEDURE — 82570 ASSAY OF URINE CREATININE: CPT

## 2025-03-11 PROCEDURE — 83036 HEMOGLOBIN GLYCOSYLATED A1C: CPT | Mod: GA

## 2025-03-11 PROCEDURE — 36415 COLL VENOUS BLD VENIPUNCTURE: CPT

## 2025-03-11 PROCEDURE — 80053 COMPREHEN METABOLIC PANEL: CPT

## 2025-03-11 PROCEDURE — 83540 ASSAY OF IRON: CPT

## 2025-03-11 PROCEDURE — 84443 ASSAY THYROID STIM HORMONE: CPT

## 2025-03-11 PROCEDURE — 83550 IRON BINDING TEST: CPT

## 2025-03-11 PROCEDURE — 82607 VITAMIN B-12: CPT | Mod: GA

## 2025-03-11 PROCEDURE — 82306 VITAMIN D 25 HYDROXY: CPT

## 2025-03-12 LAB
CREAT UR-MCNC: 78.4 MG/DL
MICROALBUMIN UR-MCNC: <1.2 MG/DL
MICROALBUMIN/CREAT UR: NORMAL MG/G (ref 0–30)

## 2025-03-13 ENCOUNTER — APPOINTMENT (OUTPATIENT)
Dept: RADIOLOGY | Facility: MEDICAL CENTER | Age: 75
End: 2025-03-13
Attending: NURSE PRACTITIONER
Payer: MEDICARE

## 2025-03-13 DIAGNOSIS — Z12.31 VISIT FOR SCREENING MAMMOGRAM: ICD-10-CM

## 2025-03-13 PROCEDURE — 77067 SCR MAMMO BI INCL CAD: CPT

## 2025-03-14 ENCOUNTER — OFFICE VISIT (OUTPATIENT)
Dept: MEDICAL GROUP | Facility: MEDICAL CENTER | Age: 75
End: 2025-03-14
Payer: MEDICARE

## 2025-03-14 ENCOUNTER — RESULTS FOLLOW-UP (OUTPATIENT)
Dept: MEDICAL GROUP | Facility: MEDICAL CENTER | Age: 75
End: 2025-03-14

## 2025-03-14 VITALS
WEIGHT: 194.2 LBS | TEMPERATURE: 97.6 F | RESPIRATION RATE: 14 BRPM | BODY MASS INDEX: 35.74 KG/M2 | HEART RATE: 71 BPM | HEIGHT: 62 IN | DIASTOLIC BLOOD PRESSURE: 50 MMHG | SYSTOLIC BLOOD PRESSURE: 118 MMHG | OXYGEN SATURATION: 91 %

## 2025-03-14 DIAGNOSIS — M54.50 CHRONIC MIDLINE LOW BACK PAIN WITHOUT SCIATICA: ICD-10-CM

## 2025-03-14 DIAGNOSIS — I10 ESSENTIAL HYPERTENSION: ICD-10-CM

## 2025-03-14 DIAGNOSIS — R25.2 FOOT CRAMPS: ICD-10-CM

## 2025-03-14 DIAGNOSIS — E87.1 HYPONATREMIA: ICD-10-CM

## 2025-03-14 DIAGNOSIS — E55.9 VITAMIN D DEFICIENCY: ICD-10-CM

## 2025-03-14 DIAGNOSIS — G89.29 CHRONIC MIDLINE LOW BACK PAIN WITHOUT SCIATICA: ICD-10-CM

## 2025-03-14 DIAGNOSIS — E66.01 SEVERE OBESITY (BMI 35.0-39.9) WITH COMORBIDITY (HCC): ICD-10-CM

## 2025-03-14 DIAGNOSIS — D64.9 NORMOCYTIC ANEMIA: ICD-10-CM

## 2025-03-14 DIAGNOSIS — N18.31 CHRONIC KIDNEY DISEASE, STAGE 3A: ICD-10-CM

## 2025-03-14 DIAGNOSIS — E78.5 DYSLIPIDEMIA: ICD-10-CM

## 2025-03-14 DIAGNOSIS — E03.4 HYPOTHYROIDISM DUE TO ACQUIRED ATROPHY OF THYROID: ICD-10-CM

## 2025-03-14 PROBLEM — E66.9 OBESITY (BMI 30-39.9): Status: RESOLVED | Noted: 2024-03-05 | Resolved: 2025-03-14

## 2025-03-14 PROCEDURE — 99214 OFFICE O/P EST MOD 30 MIN: CPT | Performed by: NURSE PRACTITIONER

## 2025-03-14 PROCEDURE — 3078F DIAST BP <80 MM HG: CPT | Performed by: NURSE PRACTITIONER

## 2025-03-14 PROCEDURE — 3074F SYST BP LT 130 MM HG: CPT | Performed by: NURSE PRACTITIONER

## 2025-03-14 ASSESSMENT — FIBROSIS 4 INDEX: FIB4 SCORE: 1.37

## 2025-03-14 NOTE — LETTER
[facility]  MARIANO GibbsP.R.N.  [address] [facility] [address]  [place] [address]  Fax: [phone]   Authorization for Release/Disclosure of   Protected Health Information   Name: PRINCE AGUIRRE : 1950 SSN: [id number]   Address: [address] [place] [address] [place] [address] Phone:    [phone] (home)    I authorize the entity listed below to release/disclose the PHI below to:   [facility]/Cony Guerrero, A.P.R.N. and JOCELYN Gibbs.P.R.ELIANE.   Provider or Entity Name:  [facility]   City, State, Zip   Phone:      Fax:     Reason for request: continuity of care   Information to be released:    [  ] LAST COLONOSCOPY,  including any PATH REPORT and follow-up   [  ] LAST DEXA  [  ] LAST MAMMOGRAM  [  ] LAST PAP  [  ] LAST LABS [  ] RETINA EXAM REPORT  [  ] IMMUNIZATION RECORDS  [ x ] MRI Lumbar results       [  ] Check here and initial the line next to each item to release ALL health information INCLUDING  _____ Care and treatment for drug and / or alcohol abuse  _____ HIV testing, infection status, or AIDS  _____ Genetic Testing    DATES OF SERVICE OR TIME PERIOD TO BE DISCLOSED: _____________  I understand and acknowledge that:  * This Authorization may be revoked at any time by you in writing, except if your health information has already been used or disclosed.  * Your health information that will be used or disclosed as a result of you signing this authorization could be re-disclosed by the recipient. If this occurs, your re-disclosed health information may no longer be protected by State or Federal laws.  * You may refuse to sign this Authorization. Your refusal will not affect your ability to obtain treatment.  * This Authorization becomes effective upon signing and will  on (date) __________.      If no date is indicated, this Authorization will  one (1) year from the signature date.    Name: Prince Aguirre  Signature: Date:   3/14/2025      PLEASE FAX REQUESTED RECORDS BACK TO: [phone]

## 2025-03-14 NOTE — LETTER
[facility]  MARIANO GibbsP.RMiguelN.  [address]  [place] [address]  Fax: [phone]   Authorization for Release/Disclosure of   Protected Health Information   Name: PRINCE AGUIRRE : 1950 SSN: [id number]   Address: [address] [place] [address] [place] [address] Phone:    [phone] (home)    I authorize the entity listed below to release/disclose the PHI below to:   [facility]/Cony Guerrero A.P.R.N. and MARIANO GibbsP.RANISH.   Provider or Entity Name:     Address   City, State, Zip   Phone:      Fax:     Reason for request: continuity of care   Information to be released:    [  ] LAST COLONOSCOPY,  including any PATH REPORT and follow-up   [  ] LAST DEXA  [  ] LAST MAMMOGRAM  [  ] LAST PAP  [  ] LAST LABS [  ] RETINA EXAM REPORT  [  ] IMMUNIZATION RECORDS  [ X ] MRI LUMBAR results      [  ] Check here and initial the line next to each item to release ALL health information INCLUDING  _____ Care and treatment for drug and / or alcohol abuse  _____ HIV testing, infection status, or AIDS  _____ Genetic Testing    DATES OF SERVICE OR TIME PERIOD TO BE DISCLOSED: _____________  I understand and acknowledge that:  * This Authorization may be revoked at any time by you in writing, except if your health information has already been used or disclosed.  * Your health information that will be used or disclosed as a result of you signing this authorization could be re-disclosed by the recipient. If this occurs, your re-disclosed health information may no longer be protected by State or Federal laws.  * You may refuse to sign this Authorization. Your refusal will not affect your ability to obtain treatment.  * This Authorization becomes effective upon signing and will  on (date) __________.      If no date is indicated, this Authorization will  one (1) year from the signature date.    Name: Prince Aguirre  Signature: Date:   3/14/2025     PLEASE FAX REQUESTED  She can come in on 12/31.  Thanks!   RECORDS BACK TO: [phone]

## 2025-03-14 NOTE — PROGRESS NOTES
Subjective:     Prince Mazariegos is a 74 y.o. female presents to discuss:     Chief Complaint   Patient presents with    Lab Results    Follow-Up     6 months fv     Verbal consent was acquired by the patient to use [employer] ambient listening note generation during this visit Yes   History of Present Illness  The patient presents for evaluation for follow up, review blood work.     She has been on hydrochlorothiazide (HCTZ) for an extended period, initially prescribed for blood pressure management. She does not monitor her blood pressure at home. She maintains a daily fluid intake of approximately 64 ounces. She experiences occasional foot swelling, which she attributes to cramps in the dorsal aspect of her feet. These cramps are severe enough to prevent ankle flexion and often disrupt her sleep. She finds relief from these symptoms by consuming soda water. She always wears orthotics and has 2 pairs that she alternates. She does not go barefoot.    She reports difficulty with prolonged standing due to back pain, although walking is less challenging. She does not use a cane but occasionally relies on a walker when experiencing severe leg or foot pain. She also uses a knee brace intermittently. She recently received a knee injection, which typically provides relief after a period of 2 weeks. She continues to experience pain from a pulled muscle, which has persisted for 3 months. She underwent an MRI of her lower back at [facility] over 5 years ago, which revealed a pinched nerve. She has declined surgical intervention for this issue. She has received 2 epidural injections in the past but has not required one recently.     She has been struggling with weight loss, despite limiting herself to one meal per day. She does not experience night sweats but reports feeling hot at night.     She has been taking B12 supplements due to a previous deficiency.      ROS: : see above      Current Outpatient  "Medications:     levothyroxine (SYNTHROID) 75 MCG Tab, Take 1 Tablet by mouth every day., Disp: 90 Tablet, Rfl: 3    lisinopril (PRINIVIL) 40 MG tablet, Take 1 Tablet by mouth every day., Disp: 90 Tablet, Rfl: 3    metoprolol SR (TOPROL XL) 50 MG TABLET SR 24 HR, Take 1 Tablet by mouth every day., Disp: 90 Tablet, Rfl: 3    rosuvastatin (CRESTOR) 10 MG Tab, Take 1 Tablet by mouth every evening., Disp: 90 Tablet, Rfl: 3    prednisoLONE acetate (PRED FORTE) 1 % Suspension, INSTILL 1 DROP TO OPERATIVE EYE(S) 4 TIMES DAILY. START 2 DAYS PRIOR TO SURGERY, Disp: , Rfl:     Calcium-Vitamin D-Vitamin K (VIACTIV PO), Take 2 Tablets by mouth every evening., Disp: , Rfl:     acetaminophen (TYLENOL) 500 MG Tab, Take 1,000 mg by mouth every 6 hours as needed for Mild Pain. Indications: Pain, Disp: , Rfl:     Cholecalciferol (VITAMIN D) 2000 UNIT Tab, Take 2,000 Units by mouth every evening., Disp: , Rfl:     polyethylene glycol 3350 (MIRALAX) 17 GM/SCOOP Powder, Take 17 g by mouth every day., Disp: , Rfl:     multivitamin (THERAGRAN) Tab, Take 2 Tabs by mouth every evening., Disp: , Rfl:     Fiber Powder, Take 1 Each by mouth every day., Disp: , Rfl:     Allergies   Allergen Reactions    Hydrocodone Hives    Advil [Ibuprofen] Hives    Celebrex [Celecoxib]      Affects kidney Function    Latex Rash    Naproxen Sodium Hives    Other Misc Swelling     Some soap & toothpaste puffs, colored laundry soap dryer sheets    Skelaxin [Metaxalone] Hives    Sunscreens Hives       Objective:   Vitals: /50   Pulse 71   Temp 36.4 °C (97.6 °F) (Temporal)   Resp 14   Ht 1.575 m (5' 2\")   Wt 88.1 kg (194 lb 3.2 oz)   SpO2 91%   BMI 35.52 kg/m²    General: Alert, pleasant, NAD  HEENT: Normocephalic.  Neck supple.   Respiratory: no distress, no audible wheezing, RR -WNL  Skin: Warm, dry, no rashes.  Extremities: No leg edema. No discoloration  Neurological: No tremors  Psych:  Affect/mood is normal, judgement is good, memory is intact, " grooming is appropriate.  Assessment/Plan:      1. Hyponatremia  Mild, levels trending down.  Possibly secondary to HCTZ use.  - Basic Metabolic Panel; Future    2. Normocytic anemia  - CBC WITH DIFFERENTIAL; Future  - Sed Rate; Future  - FERRITIN; Future  - LDH; Future    3. Chronic midline low back pain without sciatica    4. Essential hypertension  Chronic.  Blood pressure is stable in the clinic.  Continue lisinopril 40 mg.  We will go ahead and stop the HCTZ as her sodium levels are trending down.  Follow-up 1 month.    5. Dyslipidemia  Stable.,  Normal LFTs.  Denies myalgias.  Continue rosuvastatin 10 mg.     6. Vitamin D deficiency  Stable.  Continue supplementation.    7. Chronic kidney disease, stage 3a  Chronic.  Most recent GFR stable at 51.  Avoid NSAIDs, ensure adequate hydration.  Recheck BMP 1 month as we will go ahead and stop the HCTZ.    8. Hypothyroidism due to acquired atrophy of thyroid  Chronic.  TSH stable.  Continue levothyroxine 75 mcg daily.    9. Severe obesity (BMI 35.0-39.9) with comorbidity (HCC)    10. Foot cramps       Assessment & Plan  1. Hyponatremia.  Her sodium levels have decreased from 133 to 130, indicating mild hyponatremia. This could be attributed to her long-term use of HCTZ, which may also be contributing to her foot cramps. She will discontinue HCTZ and recheck her sodium levels in 2 to 4 weeks. She is advised to maintain adequate hydration. She is encouraged to invest in a home blood pressure monitor and bring it to her next appointment for calibration. She should aim to keep her systolic blood pressure below 140 and diastolic blood pressure below 90. She can check her blood pressure once or twice daily or whenever she feels unwell. She is advised to rest for a few minutes before taking her blood pressure and to avoid crossing her legs during the measurement. She should ensure that the cuff is neither too tight nor too loose and should not talk during the measurement.  She is advised to wear compression sleeves on her feet and to avoid going barefoot. If her blood pressure consistently exceeds 140/90, she should inform us so that we can adjust her medication accordingly.    2. Anemia.  Her blood count is slightly on the lower end, indicating mild anemia. Her iron saturation is within the desired range, and her B12 levels are normal. She will continue taking B12 supplements as previously prescribed. A ferritin level test will be conducted to assess her iron storage.    3. Foot cramps.  She experiences cramps in the dorsal aspect of her feet, which are severe enough to prevent ankle flexion and often disrupt her sleep. She finds relief from these symptoms by consuming soda water. She is advised to wear compression sleeves on her feet and to avoid going barefoot.    4. Back pain.  She reports difficulty with prolonged standing due to back pain, although walking is less challenging. She does not use a cane but occasionally relies on a walker when experiencing severe leg or foot pain. She also uses a knee brace intermittently. Have provided handicap placard.  Requesting lumbar MRI results     Follow-up  The patient will follow up in 1 month.      Return in about 4 weeks (around 4/11/2025).    {I have placed the above orders and discussed them with an approved delegating provider. The MA is performing the below orders under the direction of Dr. Memo CORONA    Health maintenance:    General Recommendations:   Smoking: recommend complete avoidance of all tobacco products  Alcohol: recommend limiting consumption  Physical Activity: goal is 30 min of moderate activity 5 times a week  Weight Management and Nutrition: high vegetable, high protein diet like the Mediterranean diet, portion control, avoid excessive sugars, Low Glycemic Index foods, adequate hydration, sleep.

## 2025-03-18 NOTE — TELEPHONE ENCOUNTER
Detail Level: Detailed Fasting labs ordered.   Add 78542 Cpt? (Important Note: In 2017 The Use Of 05236 Is Being Tracked By Cms To Determine Future Global Period Reimbursement For Global Periods): yes

## 2025-03-27 ENCOUNTER — TELEPHONE (OUTPATIENT)
Dept: MEDICAL GROUP | Facility: MEDICAL CENTER | Age: 75
End: 2025-03-27
Payer: MEDICARE

## 2025-03-27 NOTE — TELEPHONE ENCOUNTER
----- Message from Nurse Practitioner CHLOE Zapata sent at 3/18/2025  7:44 AM PDT -----  Regarding: RE: Appt  We can put her in a doc time  ----- Message -----  From: Sarah Ackerman, Med Ass't  Sent: 3/17/2025   3:41 PM PDT  To: CHLOE Gibbs  Subject: FW: Appt                                           ----- Message -----  From: Mae Riddle  Sent: 3/14/2025   8:28 AM PDT  To: [address] [facility]  Subject: Appt                                             Hello,    Pt wanted me to let Cat know that there are no appts available until May and Cat wanted to see her in 4 weeks. I did offer another provider and she declined.  I did add her to the wait list.  Please contact pt if the May appt will not work.    Thank you,  Mae :)

## 2025-04-18 ENCOUNTER — HOSPITAL ENCOUNTER (OUTPATIENT)
Dept: LAB | Facility: MEDICAL CENTER | Age: 75
End: 2025-04-18
Attending: NURSE PRACTITIONER
Payer: MEDICARE

## 2025-04-18 DIAGNOSIS — E87.1 HYPONATREMIA: ICD-10-CM

## 2025-04-18 DIAGNOSIS — D64.9 NORMOCYTIC ANEMIA: ICD-10-CM

## 2025-04-18 LAB
BASOPHILS # BLD AUTO: 1 % (ref 0–1.8)
BASOPHILS # BLD: 0.06 K/UL (ref 0–0.12)
EOSINOPHIL # BLD AUTO: 0.09 K/UL (ref 0–0.51)
EOSINOPHIL NFR BLD: 1.5 % (ref 0–6.9)
ERYTHROCYTE [DISTWIDTH] IN BLOOD BY AUTOMATED COUNT: 45.9 FL (ref 35.9–50)
ERYTHROCYTE [SEDIMENTATION RATE] IN BLOOD BY WESTERGREN METHOD: 25 MM/HOUR (ref 0–25)
HCT VFR BLD AUTO: 36.1 % (ref 37–47)
HGB BLD-MCNC: 12.2 G/DL (ref 12–16)
IMM GRANULOCYTES # BLD AUTO: 0.03 K/UL (ref 0–0.11)
IMM GRANULOCYTES NFR BLD AUTO: 0.5 % (ref 0–0.9)
LYMPHOCYTES # BLD AUTO: 0.94 K/UL (ref 1–4.8)
LYMPHOCYTES NFR BLD: 15.3 % (ref 22–41)
MCH RBC QN AUTO: 31.6 PG (ref 27–33)
MCHC RBC AUTO-ENTMCNC: 33.8 G/DL (ref 32.2–35.5)
MCV RBC AUTO: 93.5 FL (ref 81.4–97.8)
MONOCYTES # BLD AUTO: 0.58 K/UL (ref 0–0.85)
MONOCYTES NFR BLD AUTO: 9.4 % (ref 0–13.4)
NEUTROPHILS # BLD AUTO: 4.44 K/UL (ref 1.82–7.42)
NEUTROPHILS NFR BLD: 72.3 % (ref 44–72)
NRBC # BLD AUTO: 0 K/UL
NRBC BLD-RTO: 0 /100 WBC (ref 0–0.2)
PLATELET # BLD AUTO: 213 K/UL (ref 164–446)
PMV BLD AUTO: 10.2 FL (ref 9–12.9)
RBC # BLD AUTO: 3.86 M/UL (ref 4.2–5.4)
WBC # BLD AUTO: 6.1 K/UL (ref 4.8–10.8)

## 2025-04-18 PROCEDURE — 85025 COMPLETE CBC W/AUTO DIFF WBC: CPT

## 2025-04-18 PROCEDURE — 36415 COLL VENOUS BLD VENIPUNCTURE: CPT

## 2025-04-18 PROCEDURE — 85652 RBC SED RATE AUTOMATED: CPT

## 2025-04-18 PROCEDURE — 82728 ASSAY OF FERRITIN: CPT

## 2025-04-18 PROCEDURE — 80048 BASIC METABOLIC PNL TOTAL CA: CPT

## 2025-04-18 PROCEDURE — 83615 LACTATE (LD) (LDH) ENZYME: CPT

## 2025-04-19 LAB
ANION GAP SERPL CALC-SCNC: 11 MMOL/L (ref 7–16)
BUN SERPL-MCNC: 15 MG/DL (ref 8–22)
CALCIUM SERPL-MCNC: 10.1 MG/DL (ref 8.5–10.5)
CHLORIDE SERPL-SCNC: 104 MMOL/L (ref 96–112)
CO2 SERPL-SCNC: 24 MMOL/L (ref 20–33)
CREAT SERPL-MCNC: 1.13 MG/DL (ref 0.5–1.4)
FERRITIN SERPL-MCNC: 540 NG/ML (ref 10–291)
GFR SERPLBLD CREATININE-BSD FMLA CKD-EPI: 51 ML/MIN/1.73 M 2
GLUCOSE SERPL-MCNC: 102 MG/DL (ref 65–99)
LDH SERPL L TO P-CCNC: 148 U/L (ref 107–266)
POTASSIUM SERPL-SCNC: 4.2 MMOL/L (ref 3.6–5.5)
SODIUM SERPL-SCNC: 139 MMOL/L (ref 135–145)

## 2025-05-27 ENCOUNTER — OFFICE VISIT (OUTPATIENT)
Dept: MEDICAL GROUP | Facility: MEDICAL CENTER | Age: 75
End: 2025-05-27
Payer: MEDICARE

## 2025-05-27 VITALS
OXYGEN SATURATION: 97 % | HEIGHT: 62 IN | HEART RATE: 66 BPM | WEIGHT: 189 LBS | RESPIRATION RATE: 14 BRPM | BODY MASS INDEX: 34.78 KG/M2 | TEMPERATURE: 97.5 F | SYSTOLIC BLOOD PRESSURE: 124 MMHG | DIASTOLIC BLOOD PRESSURE: 72 MMHG

## 2025-05-27 DIAGNOSIS — E66.01 SEVERE OBESITY (BMI 35.0-39.9) WITH COMORBIDITY (HCC): ICD-10-CM

## 2025-05-27 DIAGNOSIS — E87.1 HYPONATREMIA: Primary | ICD-10-CM

## 2025-05-27 DIAGNOSIS — N18.31 CHRONIC KIDNEY DISEASE, STAGE 3A: ICD-10-CM

## 2025-05-27 DIAGNOSIS — R79.89 ELEVATED FERRITIN: ICD-10-CM

## 2025-05-27 DIAGNOSIS — M54.50 CHRONIC MIDLINE LOW BACK PAIN WITHOUT SCIATICA: ICD-10-CM

## 2025-05-27 DIAGNOSIS — G89.29 CHRONIC MIDLINE LOW BACK PAIN WITHOUT SCIATICA: ICD-10-CM

## 2025-05-27 DIAGNOSIS — I10 ESSENTIAL HYPERTENSION: ICD-10-CM

## 2025-05-27 DIAGNOSIS — D64.9 NORMOCYTIC ANEMIA: ICD-10-CM

## 2025-05-27 PROCEDURE — 3074F SYST BP LT 130 MM HG: CPT | Performed by: NURSE PRACTITIONER

## 2025-05-27 PROCEDURE — 99214 OFFICE O/P EST MOD 30 MIN: CPT | Performed by: NURSE PRACTITIONER

## 2025-05-27 PROCEDURE — 3078F DIAST BP <80 MM HG: CPT | Performed by: NURSE PRACTITIONER

## 2025-05-27 ASSESSMENT — FIBROSIS 4 INDEX: FIB4 SCORE: 1.75

## 2025-05-27 NOTE — PROGRESS NOTES
Subjective:     Prince Mazariegos is a 74 y.o. female presents to discuss:     Chief Complaint   Patient presents with    Follow-Up     Verbal consent was acquired by the patient to use brick&mobile ambient listening note generation during this visit Yes   History of Present Illness  The patient presents for evaluation of hypertension, weight management, back pain, foot cramps, elevated ferritin level    She has been actively engaged in weight loss efforts, including dietary modifications such as portion control. She has been maintaining a food diary. She has been incorporating physical activity into her routine, despite experiencing back pain. An upcoming appointment for an epidural injection in her back is anticipated to enhance her mobility. She has been unable to use her recumbent bicycle due to exacerbation of her pain but plans to resume this activity post-epidural. She engages in arm exercises for 1.5 to 2 hours daily and performs neck, shoulder, leg, and foot exercises three times a week, which do not exacerbate her pain. Heat therapy is beneficial for her. She maintains an active lifestyle, including social activities and pet care. A ramp has been installed at her home to facilitate outdoor access. She reports no recent falls but mentions a past incident where she fell off a curb. Fatigue is experienced by the end of the day. She does not add salt to her food and ensures adequate hydration by consuming 64 ounces of water daily. She reports no leg swelling but notes occasional ankle swelling. Intermittent foot cramps are managed with stretching exercises. Compression stockings have not been tried. CBD oil is beneficial for her foot cramps, and Tylenol is occasionally taken for severe cramps. Epsom salt soaks provide relief. Orthotics are worn, and walking barefoot is avoided. She reports no bleeding or bruising. Iron supplements are not taken, but she takes vitamins.    An episode of bright red, itchy hands  "occurred after a dental cleaning, believed to be due to an allergic reaction to a cleaning agent used at the dentist's office. The symptoms were managed with Benadryl. No swelling in the mouth, lips, or tongue was experienced. No similar reactions have occurred at other doctor's offices.      FAMILY HISTORY  She does not have a family history of hereditary hemochromatosis.    U/s 2016 Gallbladder removed.      ROS: : see above    Current Medications[1]    Allergies[2]    Objective:   Vitals: /72   Pulse 66   Temp 36.4 °C (97.5 °F) (Temporal)   Resp 14   Ht 1.575 m (5' 2\")   Wt 85.7 kg (189 lb)   SpO2 97%   BMI 34.57 kg/m²    General: Alert, pleasant, NAD  HEENT: Normocephalic.  Neck supple.   Respiratory: no distress, no audible wheezing, RR -WNL  Skin: Warm, dry, no rashes.  Extremities: No leg edema. No discoloration  Neurological: No tremors  Psych:  Affect/mood is normal, judgement is good, memory is intact, grooming is appropriate.  Assessment/Plan:      1. Hyponatremia    2. Normocytic anemia  - CBC WITH DIFFERENTIAL; Future    3. Elevated ferritin  - FERRITIN; Future  - IRON/TOTAL IRON BIND; Future  - Comp Metabolic Panel; Future    4. Essential hypertension  - Comp Metabolic Panel; Future    5. Chronic kidney disease, stage 3a  - Comp Metabolic Panel; Future    6. Chronic midline low back pain without sciatica    7. Severe obesity (BMI 35.0-39.9) with comorbidity (HCC)       Assessment & Plan  1. Hypertension  Blood pressure readings have been consistently within the normal range on her home BP readings. Last OV HCTZ was stopped d/t persistent hyponatremia.   Now sodium level is WNL  No swelling in the legs observed.  Continue Lisinopril 40mg and Metoprolol XL 50 daily.  Treatment plan: Advised to monitor blood pressure regularly and report any significant changes or concerns. Medication adjustments discussed if swelling or elevated blood pressure occurs.    2. Weight management  Efforts to lose " weight by eating smaller portions and maintaining a list of meals. Gradual weight loss recommended to avoid rapid changes.  Treatment plan: Incorporate low-impact exercises like recumbent bicycling and aqua aerobics once back pain improves. Encouraged to continue current exercise regimen focusing on upper body strength and flexibility.    3. Back pain  Appointment next week for an epidural injection expected to significantly improve condition. Current exercise regimen includes upper body strength and flexibility exercises. Heat application using a heating pad has been beneficial and should be continued.  Treatment plan: Discussed the importance of maintaining activity to prevent stiffness.    4. Foot cramps  Occasional foot cramps managed by stretching and using a stretch band.  Treatment plan: Compression stockings and Epsom salt soaks discussed as additional options. Takes two Tylenol when cramps are severe but does not use it regularly. Advised to continue stretching exercises and consider hot rag wraps for quicker relief.    5. Elevated ferritin levels  Ferritin levels elevated, likely due to inflammation/metabolic dysregulation rather than iron overload.  Diagnostic plan: Re-evaluation of ferritin levels to be conducted in 3 months. Inflammatory markers were within normal limits.  Treatment plan: No iron supplements currently being taken.  Consider therapeutic phlebotomy.    6.  Allergic reaction  Reported allergic reaction to substances used at dentist's office causing hands to turn bright red and itch. Symptoms managed with Benadryl.  Treatment plan: Advised to inform dentist of possible allergy and consider protective measures.    7. Anemia  Anemia of chronic kidney disease   Kidney function remains stable with a filtration rate of 51.  Diagnostic plan: Continued monitoring of blood count and kidney function recommended.  Treatment plan: Discussed the importance of maintaining hydration and avoiding  Celebrex.    8. Hyponatremia  Sodium levels with in normal limits on recent labs since discontinuation of HCTZ  BP have been stable on home BP record.     Follow-up: The patient will follow up in 3 months.    Return in about 3 months (around 8/27/2025) for Lab results.    {I have placed the above orders and discussed them with an approved delegating provider. The MA is performing the below orders under the direction of Dr. Memo CORONA    Health maintenance:    General Recommendations:   Smoking: recommend complete avoidance of all tobacco products  Alcohol: recommend limiting consumption  Physical Activity: goal is 30 min of moderate activity 5 times a week  Weight Management and Nutrition: high vegetable, high protein diet like the Mediterranean diet, portion control, avoid excessive sugars, Low Glycemic Index foods, adequate hydration, sleep.         [1]   Current Outpatient Medications:     levothyroxine (SYNTHROID) 75 MCG Tab, Take 1 Tablet by mouth every day., Disp: 90 Tablet, Rfl: 3    lisinopril (PRINIVIL) 40 MG tablet, Take 1 Tablet by mouth every day., Disp: 90 Tablet, Rfl: 3    metoprolol SR (TOPROL XL) 50 MG TABLET SR 24 HR, Take 1 Tablet by mouth every day., Disp: 90 Tablet, Rfl: 3    rosuvastatin (CRESTOR) 10 MG Tab, Take 1 Tablet by mouth every evening., Disp: 90 Tablet, Rfl: 3    prednisoLONE acetate (PRED FORTE) 1 % Suspension, INSTILL 1 DROP TO OPERATIVE EYE(S) 4 TIMES DAILY. START 2 DAYS PRIOR TO SURGERY, Disp: , Rfl:     Calcium-Vitamin D-Vitamin K (VIACTIV PO), Take 2 Tablets by mouth every evening., Disp: , Rfl:     acetaminophen (TYLENOL) 500 MG Tab, Take 1,000 mg by mouth every 6 hours as needed for Mild Pain. Indications: Pain, Disp: , Rfl:     Cholecalciferol (VITAMIN D) 2000 UNIT Tab, Take 2,000 Units by mouth every evening., Disp: , Rfl:     polyethylene glycol 3350 (MIRALAX) 17 GM/SCOOP Powder, Take 17 g by mouth every day., Disp: , Rfl:     multivitamin  (THERAGRAN) Tab, Take 2 Tabs by mouth every evening., Disp: , Rfl:     Fiber Powder, Take 1 Each by mouth every day., Disp: , Rfl:   [2]   Allergies  Allergen Reactions    Hydrocodone Hives    Advil [Ibuprofen] Hives    Celebrex [Celecoxib]      Affects kidney Function    Latex Rash    Naproxen Sodium Hives    Other Misc Swelling     Some soap & toothpaste puffs, colored laundry soap dryer sheets    Skelaxin [Metaxalone] Hives    Sunscreens Hives

## 2025-07-09 DIAGNOSIS — E78.5 DYSLIPIDEMIA: ICD-10-CM

## 2025-07-09 DIAGNOSIS — E03.4 HYPOTHYROIDISM DUE TO ACQUIRED ATROPHY OF THYROID: ICD-10-CM

## 2025-07-09 DIAGNOSIS — I10 ESSENTIAL HYPERTENSION: ICD-10-CM

## 2025-07-09 RX ORDER — LEVOTHYROXINE SODIUM 75 UG/1
75 TABLET ORAL
Qty: 90 TABLET | Refills: 3 | Status: SHIPPED | OUTPATIENT
Start: 2025-07-09

## 2025-07-09 RX ORDER — METOPROLOL SUCCINATE 50 MG/1
50 TABLET, EXTENDED RELEASE ORAL
Qty: 90 TABLET | Refills: 3 | Status: SHIPPED | OUTPATIENT
Start: 2025-07-09

## 2025-07-09 RX ORDER — ROSUVASTATIN CALCIUM 10 MG/1
10 TABLET, COATED ORAL EVERY EVENING
Qty: 90 TABLET | Refills: 3 | Status: SHIPPED | OUTPATIENT
Start: 2025-07-09

## 2025-07-09 RX ORDER — LISINOPRIL 40 MG/1
40 TABLET ORAL
Qty: 90 TABLET | Refills: 3 | Status: SHIPPED | OUTPATIENT
Start: 2025-07-09

## 2025-08-22 ENCOUNTER — HOSPITAL ENCOUNTER (OUTPATIENT)
Dept: LAB | Facility: MEDICAL CENTER | Age: 75
End: 2025-08-22
Attending: NURSE PRACTITIONER
Payer: MEDICARE

## 2025-08-22 DIAGNOSIS — R79.89 ELEVATED FERRITIN: ICD-10-CM

## 2025-08-22 DIAGNOSIS — D64.9 NORMOCYTIC ANEMIA: ICD-10-CM

## 2025-08-22 DIAGNOSIS — N18.31 CHRONIC KIDNEY DISEASE, STAGE 3A: ICD-10-CM

## 2025-08-22 DIAGNOSIS — I10 ESSENTIAL HYPERTENSION: ICD-10-CM

## 2025-08-22 LAB
ALBUMIN SERPL BCP-MCNC: 4.3 G/DL (ref 3.2–4.9)
ALBUMIN/GLOB SERPL: 1.9 G/DL
ALP SERPL-CCNC: 106 U/L (ref 30–99)
ALT SERPL-CCNC: 18 U/L (ref 2–50)
ANION GAP SERPL CALC-SCNC: 13 MMOL/L (ref 7–16)
AST SERPL-CCNC: 23 U/L (ref 12–45)
BASOPHILS # BLD AUTO: 0.7 % (ref 0–1.8)
BASOPHILS # BLD: 0.04 K/UL (ref 0–0.12)
BILIRUB SERPL-MCNC: 0.6 MG/DL (ref 0.1–1.5)
BUN SERPL-MCNC: 12 MG/DL (ref 8–22)
CALCIUM ALBUM COR SERPL-MCNC: 9.9 MG/DL (ref 8.5–10.5)
CALCIUM SERPL-MCNC: 10.1 MG/DL (ref 8.5–10.5)
CHLORIDE SERPL-SCNC: 102 MMOL/L (ref 96–112)
CO2 SERPL-SCNC: 22 MMOL/L (ref 20–33)
CREAT SERPL-MCNC: 1.01 MG/DL (ref 0.5–1.4)
EOSINOPHIL # BLD AUTO: 0.15 K/UL (ref 0–0.51)
EOSINOPHIL NFR BLD: 2.7 % (ref 0–6.9)
ERYTHROCYTE [DISTWIDTH] IN BLOOD BY AUTOMATED COUNT: 42.3 FL (ref 35.9–50)
FERRITIN SERPL-MCNC: 566 NG/ML (ref 10–291)
GFR SERPLBLD CREATININE-BSD FMLA CKD-EPI: 59 ML/MIN/1.73 M 2
GLOBULIN SER CALC-MCNC: 2.3 G/DL (ref 1.9–3.5)
GLUCOSE SERPL-MCNC: 92 MG/DL (ref 65–99)
HCT VFR BLD AUTO: 37.3 % (ref 37–47)
HGB BLD-MCNC: 12.5 G/DL (ref 12–16)
IMM GRANULOCYTES # BLD AUTO: 0.02 K/UL (ref 0–0.11)
IMM GRANULOCYTES NFR BLD AUTO: 0.4 % (ref 0–0.9)
IRON SATN MFR SERPL: 45 % (ref 15–55)
IRON SERPL-MCNC: 124 UG/DL (ref 40–170)
LYMPHOCYTES # BLD AUTO: 1.33 K/UL (ref 1–4.8)
LYMPHOCYTES NFR BLD: 23.8 % (ref 22–41)
MCH RBC QN AUTO: 30.6 PG (ref 27–33)
MCHC RBC AUTO-ENTMCNC: 33.5 G/DL (ref 32.2–35.5)
MCV RBC AUTO: 91.4 FL (ref 81.4–97.8)
MONOCYTES # BLD AUTO: 0.52 K/UL (ref 0–0.85)
MONOCYTES NFR BLD AUTO: 9.3 % (ref 0–13.4)
NEUTROPHILS # BLD AUTO: 3.54 K/UL (ref 1.82–7.42)
NEUTROPHILS NFR BLD: 63.1 % (ref 44–72)
NRBC # BLD AUTO: 0 K/UL
NRBC BLD-RTO: 0 /100 WBC (ref 0–0.2)
PLATELET # BLD AUTO: 222 K/UL (ref 164–446)
PMV BLD AUTO: 9.9 FL (ref 9–12.9)
POTASSIUM SERPL-SCNC: 3.9 MMOL/L (ref 3.6–5.5)
PROT SERPL-MCNC: 6.6 G/DL (ref 6–8.2)
RBC # BLD AUTO: 4.08 M/UL (ref 4.2–5.4)
SODIUM SERPL-SCNC: 137 MMOL/L (ref 135–145)
TIBC SERPL-MCNC: 276 UG/DL (ref 250–450)
UIBC SERPL-MCNC: 152 UG/DL (ref 110–370)
WBC # BLD AUTO: 5.6 K/UL (ref 4.8–10.8)

## 2025-08-22 PROCEDURE — 83540 ASSAY OF IRON: CPT

## 2025-08-22 PROCEDURE — 83550 IRON BINDING TEST: CPT

## 2025-08-22 PROCEDURE — 36415 COLL VENOUS BLD VENIPUNCTURE: CPT

## 2025-08-22 PROCEDURE — 82728 ASSAY OF FERRITIN: CPT

## 2025-08-22 PROCEDURE — 80053 COMPREHEN METABOLIC PANEL: CPT

## 2025-08-22 PROCEDURE — 85025 COMPLETE CBC W/AUTO DIFF WBC: CPT

## 2025-08-26 ENCOUNTER — OFFICE VISIT (OUTPATIENT)
Dept: MEDICAL GROUP | Facility: MEDICAL CENTER | Age: 75
End: 2025-08-26
Payer: MEDICARE

## 2025-08-26 VITALS
TEMPERATURE: 97.5 F | BODY MASS INDEX: 35.15 KG/M2 | SYSTOLIC BLOOD PRESSURE: 114 MMHG | OXYGEN SATURATION: 97 % | HEIGHT: 62 IN | DIASTOLIC BLOOD PRESSURE: 62 MMHG | HEART RATE: 62 BPM | WEIGHT: 191 LBS | RESPIRATION RATE: 12 BRPM

## 2025-08-26 DIAGNOSIS — I10 ESSENTIAL HYPERTENSION: Primary | ICD-10-CM

## 2025-08-26 DIAGNOSIS — R73.02 IGT (IMPAIRED GLUCOSE TOLERANCE): ICD-10-CM

## 2025-08-26 DIAGNOSIS — E03.4 HYPOTHYROIDISM DUE TO ACQUIRED ATROPHY OF THYROID: ICD-10-CM

## 2025-08-26 DIAGNOSIS — N18.31 CHRONIC KIDNEY DISEASE, STAGE 3A: ICD-10-CM

## 2025-08-26 DIAGNOSIS — R79.89 ELEVATED FERRITIN: ICD-10-CM

## 2025-08-26 DIAGNOSIS — D64.9 NORMOCYTIC ANEMIA: ICD-10-CM

## 2025-08-26 DIAGNOSIS — E78.5 DYSLIPIDEMIA: ICD-10-CM

## 2025-08-26 DIAGNOSIS — E87.1 HYPONATREMIA: ICD-10-CM

## 2025-08-26 ASSESSMENT — FIBROSIS 4 INDEX: FIB4 SCORE: 1.78

## (undated) DEVICE — STOCKINETTE IMPERVIOUS 12X48 - STERILELF (10/CA)"

## (undated) DEVICE — SPONGE GAUZESTER 4 X 4 4PLY - (128PK/CA)

## (undated) DEVICE — SUTURE GENERAL

## (undated) DEVICE — ELECTRODE 850 FOAM ADHESIVE - HYDROGEL RADIOTRNSPRNT (50/PK)

## (undated) DEVICE — DRILL, BOSS

## (undated) DEVICE — SUTURE 2-0 MONOCRYL PLUS UNDYED CT-1 1 X 36 (36EA/BX)"

## (undated) DEVICE — SODIUM CHL IRRIGATION 0.9% 1000ML (12EA/CA)

## (undated) DEVICE — GLOVE BIOGEL PI ORTHO SZ 8 PF LF (40PR/BX)

## (undated) DEVICE — DRAPE U ORTHOPEDIC - (10/BX)

## (undated) DEVICE — CHLORAPREP 26 ML APPLICATOR - ORANGE TINT(25/CA)

## (undated) DEVICE — DRESSING PETROLEUM GAUZE 5 X 9" (50EA/BX 4BX/CA)"

## (undated) DEVICE — DRAPE STRLE REG TOWEL 18X24 - (10/BX 4BX/CA)"

## (undated) DEVICE — SLING ORTH UNV TIETX VLFM ARM

## (undated) DEVICE — SET LEADWIRE 5 LEAD BEDSIDE DISPOSABLE ECG (1SET OF 5/EA)

## (undated) DEVICE — GLOVE BIOGEL PI ORTHO SZ 8.5 PF LF (40/BX)

## (undated) DEVICE — KIT ANESTHESIA W/CIRCUIT & 3/LT BAG W/FILTER (20EA/CA)

## (undated) DEVICE — CANISTER SUCTION 3000ML MECHANICAL FILTER AUTO SHUTOFF MEDI-VAC NONSTERILE LF DISP  (40EA/CA)

## (undated) DEVICE — FOAM FACEHOLDER SPIDER (8EA/BX)

## (undated) DEVICE — DRAPE U SPLIT IMP 54 X 76 - (24/CA)

## (undated) DEVICE — MASK ANESTHESIA ADULT  - (100/CA)

## (undated) DEVICE — HEAD HOLDER JUNIOR/ADULT

## (undated) DEVICE — GLOVE BIOGEL PI ORTHO SZ 7.5 PF LF (40PR/BX)

## (undated) DEVICE — BLADE SAGITTAL SAW DUAL CUT 75.0 X 25.0MM (1/EA)

## (undated) DEVICE — GOWN WARMING STANDARD FLEX - (30/CA)

## (undated) DEVICE — DRILL BIT

## (undated) DEVICE — DETERGENT RENUZYME PLUS 10 OZ PACKET (50/BX)

## (undated) DEVICE — STAPLER SKIN DISP - (6/BX 10BX/CA) VISISTAT

## (undated) DEVICE — SUTURE 2-0 MONOCRYL CT-1

## (undated) DEVICE — SUTURE 5 TI-CRON HOS-14 - (36/BX)

## (undated) DEVICE — GUIDE WIRE

## (undated) DEVICE — SUTURE 0 ETHIBOND CT-1 - (12/BX) 18 INCH

## (undated) DEVICE — TUBING CLEARLINK DUO-VENT - C-FLO (48EA/CA)

## (undated) DEVICE — TUBE E-T HI-LO CUFF 8.0MM (10EA/PK)

## (undated) DEVICE — SET EXTENSION WITH 2 PORTS (48EA/CA) ***PART #2C8610 IS A SUBSTITUTE*****

## (undated) DEVICE — DRAPE SURGICAL U 77X120 - (10/CA)

## (undated) DEVICE — PROTECTOR ULNA NERVE - (36PR/CA)

## (undated) DEVICE — KIT ROOM DECONTAMINATION

## (undated) DEVICE — SODIUM CHL. IRRIGATION 0.9% 3000ML (4EA/CA 65CA/PF)

## (undated) DEVICE — SUCTION INSTRUMENT YANKAUER BULBOUS TIP W/O VENT (50EA/CA)

## (undated) DEVICE — ELECTRODE DUAL RETURN W/ CORD - (50/PK)

## (undated) DEVICE — NEPTUNE 4 PORT MANIFOLD - (20/PK)

## (undated) DEVICE — LACTATED RINGERS INJ 1000 ML - (14EA/CA 60CA/PF)

## (undated) DEVICE — TIP INTPLS HFLO ML ORFC BTRY - (12/CS)  FOR SURGILAV

## (undated) DEVICE — PACK TOTAL HIP - (1/CA)

## (undated) DEVICE — HANDPIECE 10FT INTPLS SCT PLS IRRIGATION HAND CONTROL SET (6/PK)

## (undated) DEVICE — SENSOR SPO2 NEO LNCS ADHESIVE (20/BX) SEE USER NOTES